# Patient Record
Sex: FEMALE | Race: WHITE | NOT HISPANIC OR LATINO | Employment: OTHER | URBAN - METROPOLITAN AREA
[De-identification: names, ages, dates, MRNs, and addresses within clinical notes are randomized per-mention and may not be internally consistent; named-entity substitution may affect disease eponyms.]

---

## 2017-01-09 ENCOUNTER — VIRTUAL VISIT (OUTPATIENT)
Dept: FAMILY MEDICINE | Facility: CLINIC | Age: 72
End: 2017-01-09
Payer: COMMERCIAL

## 2017-01-09 DIAGNOSIS — M94.262 CHONDROMALACIA, KNEE, LEFT: ICD-10-CM

## 2017-01-09 DIAGNOSIS — M62.838 MUSCLE SPASM: ICD-10-CM

## 2017-01-09 DIAGNOSIS — M16.12 PRIMARY OSTEOARTHRITIS OF LEFT HIP: Primary | ICD-10-CM

## 2017-01-09 PROCEDURE — 99441 ZZC PHYSICIAN TELEPHONE EVALUATION 5-10 MIN: CPT | Performed by: FAMILY MEDICINE

## 2017-01-09 RX ORDER — TIZANIDINE 2 MG/1
2 TABLET ORAL 3 TIMES DAILY PRN
Qty: 90 TABLET | Refills: 5 | Status: SHIPPED | OUTPATIENT
Start: 2017-01-09 | End: 2018-06-25

## 2017-01-09 NOTE — PROGRESS NOTES
"Argenis Marcos is a 71 year old female who is being evaluated via a telephone visit.      The patient has been notified of following:     \"This telephone visit will be conducted via a call between you and your physician/provider. We have found that certain health care needs can be provided without the need for a physical exam.  This service lets us provide the care you need with a short phone conversation.  If a prescription is necessary we can send it directly to your pharmacy.  If lab work is needed we can place an order for that and you can then stop by our lab to have the test done at a later time.    We will bill your insurance company for this service.  Please check with your medical insurance if this type of visit is covered. You may be responsible for the cost of this type of visit if insurance coverage is denied.  The typical cost is $30 (10min), $59 (11-20min) and $85 (21-30min).  Most often these visits are shorter than 10 minutes.    If during the course of the call the physician/provider feels a telephone visit is not appropriate, you will not be charged for this service.\"       Consent has been obtained for this service by 2 care team members: yes. See the scanned image in the medical record.    Argenis Marcos complains of  Recheck Medication and Musculoskeletal Problem      I have reviewed and updated the patient's Past Medical History, Social History, Family History and Medication List.    ALLERGIES  Imitrex; Zomig; Sulfa drugs; and Sumatriptan    Mary Lafleur CMA (AAMA)   (MA signature)    Additional provider notes:   Called and reviewed. Tylenol # 3 allows her to do her daily activities. She has been noting some intermittent dizziness. She has been taking the flexeril and T#3 together.  Reviewed possible side effects. Will also change to tizanidine. She has appt beginning of Feb and we can refill medications at that time. She has Sufficient quantity until then. 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       I have reviewed the note as documented above.  This accurately captures the substance of my conversation with the patient,  Argenis Marcos      Total time of call between patient and provider was 5 minutes

## 2017-02-06 ENCOUNTER — TELEPHONE (OUTPATIENT)
Dept: NURSING | Facility: CLINIC | Age: 72
End: 2017-02-06

## 2017-02-06 NOTE — TELEPHONE ENCOUNTER
"Call Type: Triage Call    Presenting Problem: Patient has \"arthritis\" and took aleve at 12  midnight. \"Wants to know if she could take a tylenol #3 now at 145a  for arthritis pain.\" Triager advised patient to take around 4 am.  Triaged for medication questions-Adult/Disposition to provide health  information.  Triage Note:  Guideline Title: Medication Questions - Adult  Recommended Disposition: Provide Health Information  Original Inclination: Wanted to speak with a nurse  Override Disposition:  Intended Action: Follow advice given  Physician Contacted: No  Caller has medication question(s) that was answered with available resources ?  YES  Pregnant and has medication questions regarding prescribed and/or nonprescribed  medication(s) not covered by available resources ? NO  Breastfeeding and has medication questions regarding prescribed and/or  nonprescribed medication(s) not covered by available resources ? NO  Requested information on how to safely dispose of  or unused medications ?  NO  Sign(s) or symptom(s) associated with a diagnosed condition or with a new illness  ? NO  Prescription ordered today and not available at pharmacy putting patient at  clinical risk ? NO  Recurrence of a symptom(s) or illness post prescribed medication treatment AND  provider instructed patient to call if symptom(s) returned. ? NO  Unable to obtain prescribed medication related to available resources AND  situation poses immediate clinical risk ? NO  Pharmacy calling to clarify prescription order. ? NO  Requests refill of prescribed medication that does NOT have a valid refill; lack  of medication may cause clinical risk to patient if not available. ? NO  Has questions about prescribed and/or nonprescribed medications not covered by  available resources ? NO  Pharmacy calling with prescription question; answered per department policy. ? NO  Requests refill of prescribed medication without valid refills OR requests refill  of " prescribed medication with valid refills but does not have prescription number  (no RX container); lack of medication does not put patient at clinical risk ? NO  Physician Instructions:  Care Advice: Medication Storage: - Store medication as directed, for  example, refrigeration.  - Don't store medications in the bathroom medicine  cabinet or in direct sunlight. Humidity, heat and light can affect  medications' potency and safety. - Store all medications out of the reach  of children.  Safe Use of Medications: - Keep a list of your medications, listing both  brand and generic names, the prescribed dosage, common side effects,  recommended action for side effects, when to call their provider, and any  other specific instructions. This medication list should be updated if any  prescriptions change or if new medications are added. Your list should  include nonprescription medications, vitamins and supplements. An online  resource for a medication list is:  http://www.ahrq.gov/patients-consumers/diagnosis-treatment/treatments/safeme  ds/walletform.html or  http://www.ahrq.gov/patients-consumers/diagnosis-treatment/treatments/safeme  ds/yourmeds.pdf     Ask about your medications interaction with other  medications, supplements or foods. - Take the medication list to each  provider visit, especially if seeing more than one doctor. Make note of any  known allergies on this list. - Use your medication exactly as directed.  Any concerns about side effects should be discussed with your pharmacist or  prescribing provider before changing doses or stopping the medication. -  Don't chew or crush tablets or open capsules unless told to do so.  Some  long-acting medications can be absorbed too quickly when chewed or crushed.  Other medications either won't be effective or could cause side effects. -  If you have difficulty swallowing pills, ask your provider or the  pharmacist if the medication is available in liquid form. - For  liquid  medications, only use measuring device that came with it or was provided by  the pharmacy. Household teaspoons and tablespoons can be inaccurate. -  Never take anyone else's medication.

## 2017-02-15 ENCOUNTER — OFFICE VISIT (OUTPATIENT)
Dept: FAMILY MEDICINE | Facility: CLINIC | Age: 72
End: 2017-02-15
Payer: COMMERCIAL

## 2017-02-15 VITALS
HEIGHT: 68 IN | TEMPERATURE: 98.8 F | DIASTOLIC BLOOD PRESSURE: 76 MMHG | WEIGHT: 152 LBS | HEART RATE: 100 BPM | SYSTOLIC BLOOD PRESSURE: 122 MMHG | BODY MASS INDEX: 23.04 KG/M2

## 2017-02-15 DIAGNOSIS — H60.8X2 CHRONIC ECZEMATOUS OTITIS EXTERNA OF LEFT EAR: ICD-10-CM

## 2017-02-15 DIAGNOSIS — E03.4 HYPOTHYROIDISM DUE TO ACQUIRED ATROPHY OF THYROID: ICD-10-CM

## 2017-02-15 DIAGNOSIS — Z23 FLU VACCINE NEED: ICD-10-CM

## 2017-02-15 DIAGNOSIS — M16.12 PRIMARY OSTEOARTHRITIS OF LEFT HIP: Primary | ICD-10-CM

## 2017-02-15 PROCEDURE — 84443 ASSAY THYROID STIM HORMONE: CPT | Performed by: FAMILY MEDICINE

## 2017-02-15 PROCEDURE — 36415 COLL VENOUS BLD VENIPUNCTURE: CPT | Performed by: FAMILY MEDICINE

## 2017-02-15 PROCEDURE — 90471 IMMUNIZATION ADMIN: CPT | Performed by: FAMILY MEDICINE

## 2017-02-15 PROCEDURE — 99214 OFFICE O/P EST MOD 30 MIN: CPT | Mod: 25 | Performed by: FAMILY MEDICINE

## 2017-02-15 PROCEDURE — 90662 IIV NO PRSV INCREASED AG IM: CPT | Performed by: FAMILY MEDICINE

## 2017-02-15 RX ORDER — LEVOTHYROXINE SODIUM 75 UG/1
75 TABLET ORAL DAILY
Qty: 90 TABLET | Refills: 3 | Status: SHIPPED | OUTPATIENT
Start: 2017-02-15 | End: 2018-02-15

## 2017-02-15 RX ORDER — NEOMYCIN SULFATE, POLYMYXIN B SULFATE AND HYDROCORTISONE 10; 3.5; 1 MG/ML; MG/ML; [USP'U]/ML
4 SUSPENSION/ DROPS AURICULAR (OTIC) 3 TIMES DAILY PRN
Qty: 10 ML | Refills: 0 | Status: SHIPPED | OUTPATIENT
Start: 2017-02-15 | End: 2018-02-15

## 2017-02-15 NOTE — PROGRESS NOTES
"  SUBJECTIVE:                                                    Argenis Marcos is a 71 year old female who presents to clinic today for the following health issues:    Osteoarthritis of left hip. History of left hip osteoarthritis and chondromalacia of left knee. Taking tylenol helps with pain management, but does note increased pain when exposed to inclement weather. Continues to use hot pack and icing with relief.      Hypothyroidism. Though TSH levels were noted to be borderline high on 10/31/2016, patient denies cold intolerance or decreased metabolic rate since then. She continues to take levothyroxine without side effects.     Otitis externa. History of otitis externa of left ear. Using cortisporin sparingly without any side effects .      Past/recent records reviewed and discussed for -- immunizations and medications.      Problem list and histories reviewed & adjusted, as indicated.  Additional history: as documented    Problem list, Medication list, Allergies, and Medical/Social/Surgical histories reviewed in EPIC and updated as appropriate.    ROS:  Constitutional, HEENT, cardiovascular, pulmonary, GI, , musculoskeletal, neuro, skin, endocrine and psych systems are negative, except as otherwise noted.    This document serves as a record of the services and decisions personally performed and made by Carlos A Graves MD. It was created on their behalf by Candelario Spence, a trained medical scribe. The creation of this document is based the provider's statements to the medical scribe.  Candelario Spence February 15, 2017 11:58 AM         OBJECTIVE:                                                    /76 (BP Location: Right arm, Cuff Size: Adult Regular)  Pulse 100  Temp 98.8  F (37.1  C) (Oral)  Ht 1.734 m (5' 8.25\")  Wt 68.9 kg (152 lb)  BMI 22.94 kg/m2  Body mass index is 22.94 kg/(m^2).  GENERAL: healthy, alert and no distress  HENT: ear canals and TM's normal, nose and mouth without ulcers or lesions  NECK: no " adenopathy, no asymmetry, masses, or scars and thyroid normal to palpation  RESP: lungs clear to auscultation - no rales, rhonchi or wheezes  CV: regular rate and rhythm, normal S1 S2, no S3 or S4, no murmur, click or rub, no peripheral edema   MS: no gross musculoskeletal defects noted, no edema -- tenderness of left knee joint space, no effusion  PSYCH: mentation appears normal, affect normal/bright      Diagnostic Test Results:  none     ASSESSMENT/PLAN:                                                    (M16.12) Primary osteoarthritis of left hip  (primary encounter diagnosis)  Comment: unchanged  Plan: acetaminophen-codeine (TYLENOL #3) 300-30 MG         per tablet        Continue present medications, medications refilled    (H60.8X2) Chronic eczematous otitis externa of left ear  Comment: stable  Plan: neomycin-polymyxin-hydrocortisone (CORTISPORIN)        3.5-28243-9 otic suspension        Medications refilled, continue present medications    (E03.4) Hypothyroidism due to acquired atrophy of thyroid  Comment: previous TSH levels were borderline elevated. We'll recheck today  Plan: levothyroxine (SYNTHROID/LEVOTHROID) 75 MCG         tablet, TSH with free T4 reflex        Follow up, pending results    (Z23) Flu vaccine need  Comment:   Plan: FLU VACCINE, INCREASED ANTIGEN, PRESV FREE              Patient Instructions     Orders Placed This Encounter     FLU VACCINE, INCREASED ANTIGEN, PRESV FREE     TSH with free T4 reflex     Last Lab Result: TSH (mU/L)       Date                     Value                 10/31/2016               7.15 (H)         ----------     neomycin-polymyxin-hydrocortisone (CORTISPORIN) 3.5-87203-6 otic suspension     Sig: Place 4 drops in ear(s) 3 times daily as needed     Dispense:  10 mL     Refill:  0     acetaminophen-codeine (TYLENOL #3) 300-30 MG per tablet     Sig: Take 1 tablet by mouth 3 times daily     Dispense:  90 tablet     Refill:  3     levothyroxine (SYNTHROID/LEVOTHROID)  75 MCG tablet     Sig: Take 1 tablet (75 mcg) by mouth daily     Dispense:  90 tablet     Refill:  3             Obey Graves MD, MD  [facility]

## 2017-02-15 NOTE — LETTER
[facility]  [address] [place] [id number] [phone]      February 16, 2017      Argenis Marcos  [address] [place] [id number]              Dear Argenis,    The results of your recent thyroid tests was  within normal limits. Enclosed is a copy of these results.  If you have any further questions or problems, please contact our office.     Sincerely,    Obey Graves MD/sd    Results for orders placed or performed in visit on 02/15/17   TSH with free T4 reflex   Result Value Ref Range    TSH 0.66 0.40 - 4.00 mU/L

## 2017-02-15 NOTE — NURSING NOTE
Injectable Influenza Vaccination Documentation    1.  Is the person to be vaccinated sick today?  No    2.  Does the person to be vaccinated have an allergy to eggs or to a component of the vaccine?  No    3.  Has the person to be vaccinated ever had a serious reaction to an influenza vaccine in the past?  No    4.  Has the person to be vaccinated ever had [place] Syndrome?  No    Questions reviewed by: Ana Laura Hood MA

## 2017-02-15 NOTE — MR AVS SNAPSHOT
"              After Visit Summary   2/15/2017    Argenis Marcos    MRN: [id number]           Patient Information     Date Of Birth          1945        Visit Information        Provider Department      2/15/2017 11:20 AM Obey Graves MD [facility]        Today's Diagnoses     Primary osteoarthritis of left hip    -  1    Acute contact otitis externa of right ear        Hypothyroidism due to acquired atrophy of thyroid        Flu vaccine need           Follow-ups after your visit        Who to contact     If you have questions or need follow up information about today's clinic visit or your schedule please contact [facility] directly at [phone].  Normal or non-critical lab and imaging results will be communicated to you by MyChart, letter or phone within 4 business days after the clinic has received the results. If you do not hear from us within 7 days, please contact the clinic through MyChart or phone. If you have a critical or abnormal lab result, we will notify you by phone as soon as possible.  Submit refill requests through [employer] or call your pharmacy and they will forward the refill request to us. Please allow 3 business days for your refill to be completed.          Additional Information About Your Visit        MyChart Information     [employer] lets you send messages to your doctor, view your test results, renew your prescriptions, schedule appointments and more. To sign up, go to [email or web address] . Click on \"Log in\" on the left side of the screen, which will take you to the Welcome page. Then click on \"Sign up Now\" on the right side of the page.     You will be asked to enter the access code listed below, as well as some personal information. Please follow the directions to create your username and password.     Your access code is: [email or web address]  Expires: 2017 12:07 PM     Your access code will  in 90 days. If you need help or a " "new code, please call your [facility] clinic or [phone].        Care EveryWhere ID     This is your Care EveryWhere ID. This could be used by other organizations to access your [facility] medical records  [id number]        Your Vitals Were     Pulse Temperature Height BMI (Body Mass Index)          100 98.8  F (37.1  C) (Oral) 5' 8.25\" (1.734 m) 22.94 kg/m2         Blood Pressure from Last 3 Encounters:   02/15/17 122/76   10/31/16 106/84   02/15/16 100/72    Weight from Last 3 Encounters:   02/15/17 152 lb (68.9 kg)   10/31/16 158 lb (71.7 kg)   02/15/16 169 lb (76.7 kg)              We Performed the Following     FLU VACCINE, INCREASED ANTIGEN, PRESV FREE     TSH with free T4 reflex          Where to get your medicines      These medications were sent to [facility] [address] [place], [place] [facility] [address] [facility] [address]  [address] [facility] [address]     Phone:  [phone]     levothyroxine 75 MCG tablet    neomycin-polymyxin-hydrocortisone 3.5-84112-5 otic suspension         Some of these will need a paper prescription and others can be bought over the counter.  Ask your nurse if you have questions.     Bring a paper prescription for each of these medications     acetaminophen-codeine 300-30 MG per tablet          Primary Care Provider Office Phone # Fax #    Obey Graves [phone]       [address] [address]        Thank you!     Thank you for choosing [facility]  for your care. Our goal is always to provide you with excellent care. Hearing back from our patients is one way we can continue to improve our services. Please take a few minutes to complete the written survey that you may receive in the mail after your visit with us. Thank you!             Your Updated Medication List - Protect others around you: Learn how to safely use, store and throw away your medicines at " www.disposemymeds.org.          This list is accurate as of: 2/15/17 12:07 PM.  Always use your most recent med list.                   Brand Name Dispense Instructions for use    acetaminophen-codeine 300-30 MG per tablet    TYLENOL #3    90 tablet    Take 1 tablet by mouth 3 times daily       aspirin 81 MG tablet     100    1 tab po QD (Once per day)       CALCIUM 600 + D 600-200 MG-UNIT Tabs      2 tabs daily       clotrimazole 1 % cream    SM CLOTRIMAZOLE VAGINAL    45 g    Place 1 Applicatorful vaginally daily And apply externally bid       Cranberry 500 MG Caps     90    1 CAPSULE 2 TIMES DAILY       DIPROLENE 0.05 % ointment   Generic drug:  augmented betamethasone dipropionate     60gm     apply to the affected area bid       EPINEPHrine 0.3 MG/0.3ML injection     0.6 mL    Inject 0.3 mLs (0.3 mg) into the muscle once as needed for anaphylaxis       levothyroxine 75 MCG tablet    SYNTHROID/LEVOTHROID    90 tablet    Take 1 tablet (75 mcg) by mouth daily       Miconazole Nitrate 2 % Powd     1 Bottle    1 Application daily as needed       neomycin-polymyxin-hydrocortisone 3.5-01236-8 otic suspension    CORTISPORIN    10 mL    Place 4 drops in ear(s) 3 times daily as needed       omega-3 fatty acids 1200 MG capsule      Take 1 capsule by mouth daily.       polyethylene glycol 0.4%- propylene glycol 0.3% 0.4-0.3 % Soln ophthalmic solution    SYSTANE ULTRA    1 Bottle    Place 1 drop into both eyes as needed for dry eyes       rizatriptan 5 MG ODT tab    MAXALT-MLT    18 tablet    Take 1-2 tablets (5-10 mg) by mouth at onset of headache for migraine May repeat dose in 2 hours.  Do not exceed 30 mg in 24 hours       simvastatin 40 MG tablet    ZOCOR    90 tablet    Take 1 tablet (40 mg) by mouth At Bedtime       tiZANidine 2 MG tablet    ZANAFLEX    90 tablet    Take 1 tablet (2 mg) by mouth 3 times daily as needed for muscle spasms

## 2017-02-15 NOTE — PATIENT INSTRUCTIONS
Orders Placed This Encounter     FLU VACCINE, INCREASED ANTIGEN, PRESV FREE     TSH with free T4 reflex     Last Lab Result: TSH (mU/L)       Date                     Value                 10/31/2016               7.15 (H)         ----------     neomycin-polymyxin-hydrocortisone (CORTISPORIN) 3.5-09925-4 otic suspension     Sig: Place 4 drops in ear(s) 3 times daily as needed     Dispense:  10 mL     Refill:  0     acetaminophen-codeine (TYLENOL #3) 300-30 MG per tablet     Sig: Take 1 tablet by mouth 3 times daily     Dispense:  90 tablet     Refill:  3     levothyroxine (SYNTHROID/LEVOTHROID) 75 MCG tablet     Sig: Take 1 tablet (75 mcg) by mouth daily     Dispense:  90 tablet     Refill:  3

## 2017-02-15 NOTE — NURSING NOTE
"Chief Complaint   Patient presents with     Hip Pain     Follow up. Patient statest that her left hip and left knee pain are starting to get better now. The cold weather was making the pain worse.     Knee Pain     Follow up.       Initial /76 (BP Location: Right arm, Cuff Size: Adult Regular)  Pulse 100  Temp 98.8  F (37.1  C) (Oral)  Ht 5' 8.25\" (1.734 m)  Wt 152 lb (68.9 kg)  BMI 22.94 kg/m2 Estimated body mass index is 22.94 kg/(m^2) as calculated from the following:    Height as of this encounter: 5' 8.25\" (1.734 m).    Weight as of this encounter: 152 lb (68.9 kg).  Medication Reconciliation: complete     Ana Laura Hood MA     "

## 2017-02-16 LAB — TSH SERPL DL<=0.005 MIU/L-ACNC: 0.66 MU/L (ref 0.4–4)

## 2017-05-26 ENCOUNTER — TELEPHONE (OUTPATIENT)
Dept: FAMILY MEDICINE | Facility: CLINIC | Age: 72
End: 2017-05-26

## 2017-05-26 DIAGNOSIS — M16.12 PRIMARY OSTEOARTHRITIS OF LEFT HIP: ICD-10-CM

## 2017-05-26 NOTE — TELEPHONE ENCOUNTER
Reason for Call:  Medication or medication refill:    Do you use a [facility]?  Name of the pharmacy and phone number for the current request:  [facility]    Name of the medication requested: acetaminophen-codeine (TYLENOL #3) 300-30 MG per tablet    Other request:     Can we leave a detailed message on this number? YES    Phone number patient can be reached at: Home number on file [phone] (home)    Best Time: any    Call taken on 5/26/2017 at 2:23 PM by Lizzie Torres

## 2017-05-30 NOTE — TELEPHONE ENCOUNTER
Patient calling to check on status or refill.  Informed that refill was done and she will NTBS before next refill.  Patient will call back to schedule an appointment.

## 2017-05-30 NOTE — TELEPHONE ENCOUNTER
1 script faxed to [facility] at [phone].    Please contact patient to schedule follow up appointment.    Romana Alston, Certified Medical Assistant (AAMA)

## 2017-07-31 DIAGNOSIS — N89.8 VAGINAL ITCHING: ICD-10-CM

## 2017-08-01 NOTE — TELEPHONE ENCOUNTER
clotrimazole ( CLOTRIMAZOLE VAGINAL) 1 % vaginal cream   1 Applicatorful, DAILY 0 ordered  Reorder     Summary: Place 1 Applicatorful vaginally daily And apply externally bid  Disp-45 g, R-0  E-Prescribe   Dose, Route, Frequency: 1 Applicatorful, Vaginal, DAILY  Start: 2/14/2016  Ord/Sold: 2/14/2016 (O)  Report  Taking:   Long-term:   Pharmacy: [facility] [address] [place] [address] [address]VE [address] AT [address] Dose History  ChangeDiscontinue       Patient Sig: Place 1 Applicatorful vaginally daily And apply externally bid       Ordered on: 2/14/2016       Authorized by: SHELDON TOBIAS       Dispense: 45 g       Admin Instructions: And apply externally bid          Last Office Visit with [facility], [facility] or [facility] prescribing provider: 2-

## 2017-08-01 NOTE — TELEPHONE ENCOUNTER
Review symptoms with patient. Ok to refill if her symptoms are consistent with yeast infection.     Carlos A Graves MD

## 2017-08-02 RX ORDER — CLOTRIMAZOLE 1 %
CREAM WITH APPLICATOR VAGINAL
Qty: 45 G | Refills: 0 | Status: SHIPPED | OUTPATIENT
Start: 2017-08-02 | End: 2018-05-31

## 2017-08-02 NOTE — TELEPHONE ENCOUNTER
Called and spoke with patient. She states that it happens when it's hot out. She states she gets constipated and her legs are together and her legs rub up against her vagina. Itching and redness. No pain anywhere, open sores, fever, or discharge. Huddled with PCP, ok to refill but come in if it does not improve.    Junior Pickett RN

## 2017-09-18 ENCOUNTER — RADIANT APPOINTMENT (OUTPATIENT)
Dept: GENERAL RADIOLOGY | Facility: CLINIC | Age: 72
End: 2017-09-18
Attending: FAMILY MEDICINE
Payer: COMMERCIAL

## 2017-09-18 ENCOUNTER — OFFICE VISIT (OUTPATIENT)
Dept: FAMILY MEDICINE | Facility: CLINIC | Age: 72
End: 2017-09-18
Payer: COMMERCIAL

## 2017-09-18 VITALS
SYSTOLIC BLOOD PRESSURE: 114 MMHG | TEMPERATURE: 98.4 F | BODY MASS INDEX: 22.43 KG/M2 | HEART RATE: 88 BPM | WEIGHT: 148 LBS | DIASTOLIC BLOOD PRESSURE: 78 MMHG | HEIGHT: 68 IN

## 2017-09-18 DIAGNOSIS — Z12.31 ENCOUNTER FOR SCREENING MAMMOGRAM FOR BREAST CANCER: ICD-10-CM

## 2017-09-18 DIAGNOSIS — Z80.3 FAMILY HISTORY OF MALIGNANT NEOPLASM OF BREAST: ICD-10-CM

## 2017-09-18 DIAGNOSIS — E78.5 HYPERLIPIDEMIA LDL GOAL <130: ICD-10-CM

## 2017-09-18 DIAGNOSIS — G43.109 MIGRAINE WITH AURA AND WITHOUT STATUS MIGRAINOSUS, NOT INTRACTABLE: ICD-10-CM

## 2017-09-18 DIAGNOSIS — M25.562 LEFT KNEE PAIN, UNSPECIFIED CHRONICITY: Primary | ICD-10-CM

## 2017-09-18 DIAGNOSIS — M25.562 ACUTE PAIN OF LEFT KNEE: ICD-10-CM

## 2017-09-18 PROCEDURE — 73560 X-RAY EXAM OF KNEE 1 OR 2: CPT | Mod: LT

## 2017-09-18 PROCEDURE — 99214 OFFICE O/P EST MOD 30 MIN: CPT | Performed by: FAMILY MEDICINE

## 2017-09-18 RX ORDER — SIMVASTATIN 40 MG
40 TABLET ORAL AT BEDTIME
Qty: 90 TABLET | Refills: 3 | Status: SHIPPED | OUTPATIENT
Start: 2017-09-18 | End: 2018-06-25

## 2017-09-18 RX ORDER — RIZATRIPTAN BENZOATE 5 MG/1
5-10 TABLET, ORALLY DISINTEGRATING ORAL
Qty: 18 TABLET | Refills: 5 | Status: SHIPPED | OUTPATIENT
Start: 2017-09-18 | End: 2018-06-25

## 2017-09-18 NOTE — PATIENT INSTRUCTIONS
[phone] [facility] scheduling for mammogram        simvastatin (ZOCOR) 40 MG tablet     Sig: Take 1 tablet (40 mg) by mouth At Bedtime     Dispense:  90 tablet     Refill:  3     rizatriptan (MAXALT-MLT) 5 MG ODT tab     Sig: Take 1-2 tablets (5-10 mg) by mouth at onset of headache for migraine May repeat dose in 2 hours.  Do not exceed 30 mg in 24 hours     Dispense:  18 tablet     Refill:  5

## 2017-09-18 NOTE — NURSING NOTE
"Chief Complaint   Patient presents with     Hyperlipidemia     Refill Request     Tylenol #3     Knee Pain     Left knee injury from 1 month ago.        Initial /78 (BP Location: Right arm, Cuff Size: Adult Regular)  Pulse 88  Temp 98.4  F (36.9  C) (Oral)  Ht 5' 8.25\" (1.734 m)  Wt 148 lb (67.1 kg)  BMI 22.34 kg/m2 Estimated body mass index is 22.34 kg/(m^2) as calculated from the following:    Height as of this encounter: 5' 8.25\" (1.734 m).    Weight as of this encounter: 148 lb (67.1 kg).  Medication Reconciliation: complete     Mary Lafleur CMA (AAMA)      "

## 2017-09-18 NOTE — MR AVS SNAPSHOT
After Visit Summary   9/18/2017    Argenis Marcos    MRN: [id number]           Patient Information     Date Of Birth          1945        Visit Information        Provider Department      9/18/2017 11:00 AM Obey Graves MD [facility]        Today's Diagnoses     Left knee pain, unspecified chronicity    -  1    Hyperlipidemia LDL goal <130        Migraine with aura and without status migrainosus, not intractable        Elevated LFTs        Family history of malignant neoplasm of breast        Encounter for screening mammogram for breast cancer          Care Instructions    [phone] [place] imaging scheduling for mammogram        simvastatin (ZOCOR) 40 MG tablet     Sig: Take 1 tablet (40 mg) by mouth At Bedtime     Dispense:  90 tablet     Refill:  3     rizatriptan (MAXALT-MLT) 5 MG ODT tab     Sig: Take 1-2 tablets (5-10 mg) by mouth at onset of headache for migraine May repeat dose in 2 hours.  Do not exceed 30 mg in 24 hours     Dispense:  18 tablet     Refill:  5               Follow-ups after your visit        Your next 10 appointments already scheduled     Oct 16, 2017  1:00 PM CDT   New Visit with Hyacinth Sim OD   [facility] ([facility])    [address] [place] [id number] [phone]              Future tests that were ordered for you today     Open Future Orders        Priority Expected Expires Ordered    *MA Screening Digital Bilateral Routine  9/18/2018 9/18/2017            Who to contact     If you have questions or need follow up information about today's clinic visit or your schedule please contact [facility] directly at [phone].  Normal or non-critical lab and imaging results will be communicated to you by MyChart, letter or phone within 4 business days after the clinic has received the results. If you do not hear from us within 7 days, please contact the clinic through  "[employer] or phone. If you have a critical or abnormal lab result, we will notify you by phone as soon as possible.  Submit refill requests through [employer] or call your pharmacy and they will forward the refill request to us. Please allow 3 business days for your refill to be completed.          Additional Information About Your Visit        [employer] Information     [employer] lets you send messages to your doctor, view your test results, renew your prescriptions, schedule appointments and more. To sign up, go to [email or web address] . Click on \"Log in\" on the left side of the screen, which will take you to the Welcome page. Then click on \"Sign up Now\" on the right side of the page.     You will be asked to enter the access code listed below, as well as some personal information. Please follow the directions to create your username and password.     Your access code is: [email or web address]  Expires: 2017 12:27 PM     Your access code will  in 90 days. If you need help or a new code, please call your [facility] clinic or [phone].        Care EveryWhere ID     This is your Care EveryWhere ID. This could be used by other organizations to access your [facility] medical records  [id number]        Your Vitals Were     Pulse Temperature Height BMI (Body Mass Index)          88 98.4  F (36.9  C) (Oral) 5' 8.25\" (1.734 m) 22.34 kg/m2         Blood Pressure from Last 3 Encounters:   17 114/78   02/15/17 122/76   10/31/16 106/84    Weight from Last 3 Encounters:   17 148 lb (67.1 kg)   02/15/17 152 lb (68.9 kg)   10/31/16 158 lb (71.7 kg)                 Where to get your medicines      These medications were sent to [facility] - [place], [place] - [address] AT [facility] [address]  [address] [facility] [address]     Phone:  [phone]     rizatriptan 5 MG ODT tab    simvastatin 40 MG tablet          Primary Care Provider Office Phone # Fax #    Obey Graves, " [phone]       [address] [facility] [address]        Equal Access to Services     SPENCER ARREDONDO : Hadii aad ku hadmalcolmdano Nani, wasridharda lualaina, qaybta kaalmada bharat, ayla rajatin hayaaaditya samsonrusty mccullough laKervinpuja umana. So [facility] [phone].    ATENCIÓN: Si habla español, tiene a arauz disposición servicios gratuitos de asistencia lingüística. Llame al [phone].    We comply with applicable federal civil rights laws and [place] laws. We do not discriminate on the basis of race, color, national origin, age, disability sex, sexual orientation or gender identity.            Thank you!     Thank you for choosing [facility]  for your care. Our goal is always to provide you with excellent care. Hearing back from our patients is one way we can continue to improve our services. Please take a few minutes to complete the written survey that you may receive in the mail after your visit with us. Thank you!             Your Updated Medication List - Protect others around you: Learn how to safely use, store and throw away your medicines at www.disposemymeds.org.          This list is accurate as of: 9/18/17 12:27 PM.  Always use your most recent med list.                   Brand Name Dispense Instructions for use Diagnosis    acetaminophen-codeine 300-30 MG per tablet    TYLENOL #3    90 tablet    Take 1 tablet by mouth 3 times daily    Primary osteoarthritis of left hip       aspirin 81 MG tablet     100    1 tab po QD (Once per day)        CALCIUM 600 + D 600-200 MG-UNIT Tabs      2 tabs daily    Routine general medical examination at a health care facility       * clotrimazole 1 % cream    SM CLOTRIMAZOLE VAGINAL    45 g    Place 1 Applicatorful vaginally daily And apply externally bid    Vaginal itching       * clotrimazole 1 % cream    LOTRIMIN    45 g    PLACE ONE APPLICATORFUL VAGINALLY DAILY AND APPLY EXTERNALLY TWICE DAILY AS DIRECTED    Vaginal itching       Cranberry 500  MG Caps     90    1 CAPSULE 2 TIMES DAILY        DIPROLENE 0.05 % ointment   Generic drug:  augmented betamethasone dipropionate     60gm     apply to the affected area bid    Contact dermatitis and other eczema, due to unspecified cause       EPINEPHrine 0.3 MG/0.3ML injection 2-pack    EPIPEN/ADRENACLICK/or ANY BX GENERIC EQUIV    0.6 mL    Inject 0.3 mLs (0.3 mg) into the muscle once as needed for anaphylaxis        levothyroxine 75 MCG tablet    SYNTHROID/LEVOTHROID    90 tablet    Take 1 tablet (75 mcg) by mouth daily    Hypothyroidism due to acquired atrophy of thyroid       Miconazole Nitrate 2 % Powd     1 Bottle    1 Application daily as needed    Candidal intertrigo       neomycin-polymyxin-hydrocortisone 3.5-76493-9 otic suspension    CORTISPORIN    10 mL    Place 4 drops in ear(s) 3 times daily as needed    Chronic eczematous otitis externa of left ear       omega-3 fatty acids 1200 MG capsule      Take 1 capsule by mouth daily.        polyethylene glycol 0.4%- propylene glycol 0.3% 0.4-0.3 % Soln ophthalmic solution    SYSTANE ULTRA    1 Bottle    Place 1 drop into both eyes as needed for dry eyes    Dry eyes, bilateral       rizatriptan 5 MG ODT tab    MAXALT-MLT    18 tablet    Take 1-2 tablets (5-10 mg) by mouth at onset of headache for migraine May repeat dose in 2 hours.  Do not exceed 30 mg in 24 hours    Migraine with aura and without status migrainosus, not intractable       simvastatin 40 MG tablet    ZOCOR    90 tablet    Take 1 tablet (40 mg) by mouth At Bedtime    Hyperlipidemia LDL goal <130       tiZANidine 2 MG tablet    ZANAFLEX    90 tablet    Take 1 tablet (2 mg) by mouth 3 times daily as needed for muscle spasms    Muscle spasm       * Notice:  This list has 2 medication(s) that are the same as other medications prescribed for you. Read the directions carefully, and ask your doctor or other care provider to review them with you.

## 2017-09-18 NOTE — PROGRESS NOTES
SUBJECTIVE:   Argenis Marcos is a 71 year old female who presents to clinic today for the following health issues:      Hyperlipidemia Follow-Up      Rate your low fat/cholesterol diet?: not monitoring fat    Taking statin?  Yes, no muscle aches from statin    Other lipid medications/supplements?:  Fish oil/Omega 3, dose 1200MG without side effects         Amount of exercise or physical activity: None    Problems taking medications regularly: No    Medication side effects: none  Diet: regular (no restrictions)    Joint Pain    Onset: 1 month     Description:   Location: Left knee   Character: Sharp and stinging pain    Intensity: moderate    Progression of Symptoms: better    Accompanying Signs & Symptoms:  Other symptoms: swelling and occasional popping when bending     History:   Previous similar pain: YES      Precipitating factors:   Trauma or overuse: YES- Hit her knee on the wall while taking her chair lift up the stairs.   Pain is worse with weight bearing     Alleviating factors:  Improved by: heat and ice    Therapies Tried and outcome: Tylenol #3 helps when pain is really bad.        Problem list and histories reviewed & adjusted, as indicated.  Additional history: as documented    Patient Active Problem List   Diagnosis     Other disorder of menstruation and other abnormal bleeding from female genital tract     Esophageal reflux     Arthropathy     Cervicalgia     Toxic effect venom     Hypothyroidism     Benign neoplasm of skin of trunk, except scrotum     Pain in joint, lower leg     HYPERLIPIDEMIA LDL GOAL <130     Moderate major depression (H)     Advanced directives, counseling/discussion     Migraines     Vitreous syneresis     Osteoarthritis of hip - left     Chondromalacia, knee - left     Elevated LFTs     Muscle spasm     Cataracts, both eyes     PVD (posterior vitreous detachment), both eyes     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,, sequela     Past Surgical History:   Procedure Laterality  Date     C NONSPECIFIC PROCEDURE      URETHRAL DILATATION     HC DILATION/CURETTAGE DIAG/THER NON OB  5/21/04    D & C,hysteroscopic resection of large submucosal fibroid       Social History   Substance Use Topics     Smoking status: Former Smoker     Smokeless tobacco: Never Used      Comment: quit in the 70's     Alcohol use No     Family History   Problem Relation Age of Onset     Hypertension Mother      CANCER Mother      Glaucoma Mother      Hypertension Sister      Breast Cancer Sister      Hypertension Brother      HEART DISEASE Father      Lipids Father      DIABETES Father      Cancer - colorectal No family hx of      CEREBROVASCULAR DISEASE No family hx of      Thyroid Disease No family hx of      Macular Degeneration No family hx of          Current Outpatient Prescriptions   Medication Sig Dispense Refill     clotrimazole (LOTRIMIN) 1 % cream PLACE ONE APPLICATORFUL VAGINALLY DAILY AND APPLY EXTERNALLY TWICE DAILY AS DIRECTED 45 g 0     acetaminophen-codeine (TYLENOL #3) 300-30 MG per tablet Take 1 tablet by mouth 3 times daily 90 tablet 3     neomycin-polymyxin-hydrocortisone (CORTISPORIN) 3.5-95996-4 otic suspension Place 4 drops in ear(s) 3 times daily as needed 10 mL 0     levothyroxine (SYNTHROID/LEVOTHROID) 75 MCG tablet Take 1 tablet (75 mcg) by mouth daily 90 tablet 3     tiZANidine (ZANAFLEX) 2 MG tablet Take 1 tablet (2 mg) by mouth 3 times daily as needed for muscle spasms 90 tablet 5     simvastatin (ZOCOR) 40 MG tablet Take 1 tablet (40 mg) by mouth At Bedtime 90 tablet 3     rizatriptan (MAXALT-MLT) 5 MG disintegrating tablet Take 1-2 tablets (5-10 mg) by mouth at onset of headache for migraine May repeat dose in 2 hours.  Do not exceed 30 mg in 24 hours 18 tablet 5     EPINEPHrine 0.3 MG/0.3ML injection 2-pack Inject 0.3 mLs (0.3 mg) into the muscle once as needed for anaphylaxis 0.6 mL 3     clotrimazole (SM CLOTRIMAZOLE VAGINAL) 1 % vaginal cream Place 1 Applicatorful vaginally daily  "And apply externally bid 45 g 0     Miconazole Nitrate 2 % POWD 1 Application daily as needed 1 Bottle 3     polyethylene glycol 0.4%- propylene glycol 0.3% (SYSTANE ULTRA) 0.4-0.3 % SOLN ophthalmic solution Place 1 drop into both eyes as needed for dry eyes 1 Bottle 12     Omega-3 Fatty Acids (FISH OIL) 1200 MG capsule Take 1 capsule by mouth daily.       DIPROLENE 0.05 % EX OINT apply to the affected area bid  60gm 6     CALCIUM 600 + D 600-200 MG-UNIT OR TABS 2 tabs daily  3     CRANBERRY 500 MG OR CAPS 1 CAPSULE 2 TIMES DAILY 90 0     ASPIRIN 81 MG OR TABS 1 tab po QD (Once per day) 100 3     Allergies   Allergen Reactions     Imitrex [Sumatriptan Succinate] Shortness Of Breath     Zomig [Zolmitriptan] Shortness Of Breath     Sulfa Drugs      Sumatriptan      Labs reviewed in EPIC      Reviewed and updated as needed this visit by clinical staffTobacco  Allergies  Meds  Med Hx  Surg Hx  Fam Hx  Soc Hx      Reviewed and updated as needed this visit by Provider         ROS:  Constitutional, neuro, ENT, endocrine, pulmonary, cardiac, gastrointestinal, genitourinary, musculoskeletal, integument and psychiatric systems are negative, except as otherwise noted.    This document serves as a record of the services and decisions personally performed and made by Obey Graves MD. It was created on his behalf by Zaria Paredes, a trained medical scribe. The creation of this document is based the provider's statements to the medical scribe.  Zaria Paredes 11:37 AM September 18, 2017    OBJECTIVE:                                                    /78 (BP Location: Right arm, Cuff Size: Adult Regular)  Pulse 88  Temp 98.4  F (36.9  C) (Oral)  Ht 5' 8.25\" (1.734 m)  Wt 148 lb (67.1 kg)  BMI 22.34 kg/m2  Body mass index is 22.34 kg/(m^2).  GENERAL APPEARANCE: healthy, alert and no distress  RESP: lungs clear to auscultation - no rales, rhonchi or wheezes  CV: regular rates and rhythm, normal S1 S2, no S3 or " S4 and no murmur, click or rub  MS: tenderness with MCL flexion otherwise ROM unremarkable, no point tenderness, extremities normal- no gross deformities noted  Difficulty weight bearing with antalgic gait   ABD: soft  PSYCH: mentation appears normal and affect normal/bright    Knee was wrapped with Ace bandage for extra support     Diagnostic Test Results:  L knee Xray - unremarkable, no signs of fracture or significant degenerative disease. Unchanged from previous in 2012.       ASSESSMENT/PLAN:                                                    (M25.562) Left knee pain, unspecified chronicity  (primary encounter diagnosis)  Comment: probable tendonitis. X-ray negativ  Plan: ACE wrap. Consider PT.     (E78.5) Hyperlipidemia LDL goal <130  Comment: stable.  Plan: simvastatin (ZOCOR) 40 MG tablet        refill    (G43.109) Migraine with aura and without status migrainosus, not intractable  Comment: controlled  Plan: rizatriptan (MAXALT-MLT) 5 MG ODT tab        refilled        (Z80.3) Family history of malignant neoplasm of breast  Comment:   Plan: *MA Screening Digital Bilateral            (Z12.31) Encounter for screening mammogram for breast cancer  Comment:   Plan: *MA Screening Digital Bilateral            The information in this document, created by the medical scribe for me, accurately reflects the services I personally performed and the decisions made by me. I have reviewed and approved this document for accuracy prior to leaving the patient care area.    Obey Graves MD, MD  [facility]

## 2017-12-27 DIAGNOSIS — M16.12 PRIMARY OSTEOARTHRITIS OF LEFT HIP: Primary | ICD-10-CM

## 2017-12-27 NOTE — TELEPHONE ENCOUNTER
Controlled substance, forwarding to PCP    Refill X1. Needs follow up for further refills.     Carlos A Graves MD      Refill in completed folder

## 2017-12-27 NOTE — TELEPHONE ENCOUNTER
Patient calling to check on status of refill request. Patient notified the refill request has been received.

## 2018-01-11 ENCOUNTER — TELEPHONE (OUTPATIENT)
Dept: FAMILY MEDICINE | Facility: CLINIC | Age: 73
End: 2018-01-11

## 2018-01-11 DIAGNOSIS — M16.12 PRIMARY OSTEOARTHRITIS OF LEFT HIP: ICD-10-CM

## 2018-01-11 NOTE — TELEPHONE ENCOUNTER
acetaminophen-codeine (TYLENOL #3) 300-30 MG per tablet      Last Written Prescription Date:  12/27/2017  Last Fill Quantity: 90 tablet,   # refills: 0  Last Office Visit: 9/18/2017  Future Office visit:    Next 5 appointments (look out 90 days)     Feb 15, 2018 12:00 PM CST   SHORT with Obey Graves MD   [facility] ([facility])    [address] [place] [id number] [phone]                   Routing refill request to provider for review/approval because:  Drug not on the [facility], [facility] or [facility] refill protocol or controlled substance

## 2018-01-15 NOTE — TELEPHONE ENCOUNTER
Patient has appointment 2-15 and will review long term use and more refills at that time'    Ok to give refill to get her through until then    Refill in completed folder    Carlos A Graves MD

## 2018-01-31 ENCOUNTER — TELEPHONE (OUTPATIENT)
Dept: FAMILY MEDICINE | Facility: CLINIC | Age: 73
End: 2018-01-31

## 2018-01-31 DIAGNOSIS — M16.12 PRIMARY OSTEOARTHRITIS OF LEFT HIP: ICD-10-CM

## 2018-01-31 NOTE — TELEPHONE ENCOUNTER
acetaminophen-codeine (TYLENOL #3) 300-30 MG per tablet      Last Written Prescription Date:  1/15/2018  Last Fill Quantity: 90 tablet,   # refills: 0  Last Office Visit: 9/18/2017  Future Office visit:    Next 5 appointments (look out 90 days)     Feb 15, 2018 12:00 PM CST   SHORT with Obey Graves MD   [facility] ([facility]    [address] [place] [id number] [phone]                   Routing refill request to provider for review/approval because:  Drug not on the [facility], [facility] or [facility] refill protocol or controlled substance

## 2018-02-01 NOTE — TELEPHONE ENCOUNTER
Patient calling back regarding refill request. She states that she only has 4 pills left. TC questioned how often she is taking medication. She initially stated that she is taking 2 pills per day. TC said if that was correct, she should have about 60 pills left from 01/15 refill. Then she stated, no I'm taking 3 per day. Routing as patient calls to address as patient may be taking medication incorrectly.

## 2018-02-01 NOTE — TELEPHONE ENCOUNTER
I will give her 30 tablets but she needs to be seen.her use has clearly increased.   Refill in completed folder    Carlos A Graves MD

## 2018-02-01 NOTE — TELEPHONE ENCOUNTER
Patient was given 90 tabs 1/15 she states she takes 3 a day sometimes on occasion 4 a day never 5. The Math does not add up she says she needs to take them more than 3 times a day for her pain in her joints. She has 4 tablets left and apt mid Feb. Routing to PCP do you wish to refill before apt.  Zaria Ravi,Clinic Rn  [facility]

## 2018-02-02 NOTE — TELEPHONE ENCOUNTER
Called patient and let her know that Dr Graves did give her a script for medication.  Patient is scheduled to see Dr Graves on 02/15/2018.    Faxed script to [facility] at [phone].      Heidi Judge

## 2018-02-15 ENCOUNTER — OFFICE VISIT (OUTPATIENT)
Dept: FAMILY MEDICINE | Facility: CLINIC | Age: 73
End: 2018-02-15
Payer: COMMERCIAL

## 2018-02-15 VITALS
SYSTOLIC BLOOD PRESSURE: 100 MMHG | WEIGHT: 149 LBS | DIASTOLIC BLOOD PRESSURE: 76 MMHG | BODY MASS INDEX: 22.49 KG/M2 | HEART RATE: 88 BPM | TEMPERATURE: 99.1 F

## 2018-02-15 DIAGNOSIS — H60.8X2 CHRONIC ECZEMATOUS OTITIS EXTERNA OF LEFT EAR: ICD-10-CM

## 2018-02-15 DIAGNOSIS — M16.12 PRIMARY OSTEOARTHRITIS OF LEFT HIP: Primary | ICD-10-CM

## 2018-02-15 DIAGNOSIS — Z23 NEED FOR PROPHYLACTIC VACCINATION AND INOCULATION AGAINST INFLUENZA: ICD-10-CM

## 2018-02-15 DIAGNOSIS — E03.4 HYPOTHYROIDISM DUE TO ACQUIRED ATROPHY OF THYROID: ICD-10-CM

## 2018-02-15 DIAGNOSIS — E78.5 HYPERLIPIDEMIA LDL GOAL <130: ICD-10-CM

## 2018-02-15 DIAGNOSIS — Z91.81 AT RISK FOR FALLING: ICD-10-CM

## 2018-02-15 PROCEDURE — G0008 ADMIN INFLUENZA VIRUS VAC: HCPCS | Performed by: FAMILY MEDICINE

## 2018-02-15 PROCEDURE — 84443 ASSAY THYROID STIM HORMONE: CPT | Performed by: FAMILY MEDICINE

## 2018-02-15 PROCEDURE — 80061 LIPID PANEL: CPT | Performed by: FAMILY MEDICINE

## 2018-02-15 PROCEDURE — 36415 COLL VENOUS BLD VENIPUNCTURE: CPT | Performed by: FAMILY MEDICINE

## 2018-02-15 PROCEDURE — 90662 IIV NO PRSV INCREASED AG IM: CPT | Performed by: FAMILY MEDICINE

## 2018-02-15 PROCEDURE — 99214 OFFICE O/P EST MOD 30 MIN: CPT | Mod: 25 | Performed by: FAMILY MEDICINE

## 2018-02-15 RX ORDER — NEOMYCIN SULFATE, POLYMYXIN B SULFATE AND HYDROCORTISONE 10; 3.5; 1 MG/ML; MG/ML; [USP'U]/ML
4 SUSPENSION/ DROPS AURICULAR (OTIC) 3 TIMES DAILY PRN
Qty: 10 ML | Refills: 3 | Status: SHIPPED | OUTPATIENT
Start: 2018-02-15 | End: 2021-05-19

## 2018-02-15 RX ORDER — LEVOTHYROXINE SODIUM 75 UG/1
75 TABLET ORAL DAILY
Qty: 90 TABLET | Refills: 3 | Status: SHIPPED | OUTPATIENT
Start: 2018-02-15 | End: 2019-03-18

## 2018-02-15 NOTE — LETTER
[facility]  [address] [place]  [address]  [phone]        February 16, 2018    Argenis Marcos  [facility] [facility] [id number]        Dear Argenis,    The results of your recent lab tests were within normal limits. Enclosed is a copy of these results.  If you have any further questions or problems, please contact our office.    Results for orders placed or performed in visit on 02/15/18   Lipid panel reflex to direct LDL Fasting   Result Value Ref Range    Cholesterol 124 <200 mg/dL    Triglycerides 156 (H) <150 mg/dL    HDL Cholesterol 53 >49 mg/dL    LDL Cholesterol Calculated 40 <100 mg/dL    Non HDL Cholesterol 71 <130 mg/dL   TSH WITH FREE T4 REFLEX   Result Value Ref Range    TSH 0.76 0.40 - 4.00 mU/L       If you have any questions please call the clinic at [phone].    Sincerely,    Obey KISER

## 2018-02-15 NOTE — PROGRESS NOTES
"  SUBJECTIVE:   Argenis Marcos is a 72 year old female who presents to clinic today for the following health issues:      Left shoulder pain. Patient had sudden onset of left shoulder \"soreness\" upon awakening in the morning a few days before alton. It's \"much better\" today, and believes that she just slept \"awkwardly\". No peripheral motor deficits, weakness, numbness, or inciting injury.      Reviewed risk of falls and she is able to ambulate independently. She does use caution and does not have concerns for rugs bathroom or stairs.     Past/recent records reviewed and discussed for -- immunizations. Needs flu shot    She also needs update of refills of medications and surveillance of her hypothyroidism. No symptoms of concern for thyroid.     Has intermittent eczema of left ear     Problem list and histories reviewed & adjusted, as indicated.  Additional history: as documented    Patient Active Problem List   Diagnosis     Other disorder of menstruation and other abnormal bleeding from female genital tract     Esophageal reflux     Arthropathy     Cervicalgia     Toxic effect of venom(989.5)     Hypothyroidism     Benign neoplasm of skin of trunk, except scrotum     Pain in joint, lower leg     HYPERLIPIDEMIA LDL GOAL <130     Moderate major depression (H)     Advanced directives, counseling/discussion     Migraines     Vitreous syneresis     Osteoarthritis of hip - left     Chondromalacia, knee - left     Elevated LFTs     Muscle spasm     Cataracts, both eyes     PVD (posterior vitreous detachment), both eyes     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,, sequela     Past Surgical History:   Procedure Laterality Date     C NONSPECIFIC PROCEDURE      URETHRAL DILATATION     HC DILATION/CURETTAGE DIAG/THER NON OB  5/21/04    D & C,hysteroscopic resection of large submucosal fibroid       Social History   Substance Use Topics     Smoking status: Former Smoker     Smokeless tobacco: Never Used      Comment: quit in " the 70's     Alcohol use No     Family History   Problem Relation Age of Onset     Hypertension Mother      CANCER Mother      Glaucoma Mother      Hypertension Sister      Breast Cancer Sister      Hypertension Brother      HEART DISEASE Father      Lipids Father      DIABETES Father      Cancer - colorectal No family hx of      CEREBROVASCULAR DISEASE No family hx of      Thyroid Disease No family hx of      Macular Degeneration No family hx of          Current Outpatient Prescriptions   Medication Sig Dispense Refill     acetaminophen-codeine (TYLENOL #3) 300-30 MG per tablet TAKE 1 TABLET BY MOUTH THREE TIMES DAILY 30 tablet 0     simvastatin (ZOCOR) 40 MG tablet Take 1 tablet (40 mg) by mouth At Bedtime 90 tablet 3     rizatriptan (MAXALT-MLT) 5 MG ODT tab Take 1-2 tablets (5-10 mg) by mouth at onset of headache for migraine May repeat dose in 2 hours.  Do not exceed 30 mg in 24 hours 18 tablet 5     clotrimazole (LOTRIMIN) 1 % cream PLACE ONE APPLICATORFUL VAGINALLY DAILY AND APPLY EXTERNALLY TWICE DAILY AS DIRECTED 45 g 0     neomycin-polymyxin-hydrocortisone (CORTISPORIN) 3.5-68177-7 otic suspension Place 4 drops in ear(s) 3 times daily as needed 10 mL 0     levothyroxine (SYNTHROID/LEVOTHROID) 75 MCG tablet Take 1 tablet (75 mcg) by mouth daily 90 tablet 3     tiZANidine (ZANAFLEX) 2 MG tablet Take 1 tablet (2 mg) by mouth 3 times daily as needed for muscle spasms 90 tablet 5     EPINEPHrine 0.3 MG/0.3ML injection 2-pack Inject 0.3 mLs (0.3 mg) into the muscle once as needed for anaphylaxis 0.6 mL 3     clotrimazole (SM CLOTRIMAZOLE VAGINAL) 1 % vaginal cream Place 1 Applicatorful vaginally daily And apply externally bid 45 g 0     Miconazole Nitrate 2 % POWD 1 Application daily as needed 1 Bottle 3     polyethylene glycol 0.4%- propylene glycol 0.3% (SYSTANE ULTRA) 0.4-0.3 % SOLN ophthalmic solution Place 1 drop into both eyes as needed for dry eyes 1 Bottle 12     Omega-3 Fatty Acids (FISH OIL) 1200 MG  capsule Take 1 capsule by mouth daily.       DIPROLENE 0.05 % EX OINT apply to the affected area bid  60gm 6     CALCIUM 600 + D 600-200 MG-UNIT OR TABS 2 tabs daily  3     CRANBERRY 500 MG OR CAPS 1 CAPSULE 2 TIMES DAILY 90 0     ASPIRIN 81 MG OR TABS 1 tab po QD (Once per day) 100 3     Allergies   Allergen Reactions     Imitrex [Sumatriptan Succinate] Shortness Of Breath     Zomig [Zolmitriptan] Shortness Of Breath     Sulfa Drugs      Sumatriptan      Recent Labs   Lab Test  02/15/17   1220  10/31/16   1222   09/14/15   1207  08/31/15   1155  02/26/15   1230  10/02/14   1223   LDL   --   104*   --   80   --    --   51   HDL   --   43*   --   39*   --    --   43*   TRIG   --   305*   --   320*   --    --   223*   ALT   --   21   --    --   26  21  21   CR   --   1.00   --    --    --    --   1.01   GFRESTIMATED   --   55*   --    --    --    --   55*   GFRESTBLACK   --   66   --    --    --    --   66   POTASSIUM   --   3.9   --    --    --    --   3.9   TSH  0.66  7.15*   < >   --    --   0.95  5.81*    < > = values in this interval not displayed.      BP Readings from Last 3 Encounters:   02/15/18 100/76   09/18/17 114/78   02/15/17 122/76    Wt Readings from Last 3 Encounters:   02/15/18 67.6 kg (149 lb)   09/18/17 67.1 kg (148 lb)   02/15/17 68.9 kg (152 lb)                  Labs reviewed in EPIC    Reviewed and updated as needed this visit by clinical staff  Tobacco  Allergies  Med Hx  Surg Hx  Fam Hx  Soc Hx      Reviewed and updated as needed this visit by Provider         ROS:  Constitutional, HEENT, cardiovascular, pulmonary, GI, , musculoskeletal, neuro, skin, endocrine and psych systems are negative, except as otherwise noted.    This document serves as a record of the services and decisions personally performed and made by Carlos A Graves MD. It was created on their behalf by Candelario Spence, a trained medical scribe. The creation of this document is based the provider's statements to the medical  shelia  Candelario Spence February 15, 2018 12:25 PM       OBJECTIVE:     /76 (BP Location: Right arm, Cuff Size: Adult Regular)  Pulse 88  Temp 99.1  F (37.3  C) (Oral)  Wt 67.6 kg (149 lb)  BMI 22.49 kg/m2  Body mass index is 22.49 kg/(m^2).  GENERAL: healthy, alert and no distress  HENT: ear canals and TM's normal, nose and mouth without ulcers or lesions  NECK: no adenopathy, no asymmetry, masses, or scars and thyroid normal to palpation  RESP: lungs clear to auscultation - no rales, rhonchi or wheezes  CV: regular rate and rhythm, normal S1 S2, no S3 or S4, no murmur, click or rub  MS: no gross musculoskeletal defects noted, no edema, normal range of motion of bilateral upper extremities, 5/5 strength of upper extremities b/l.   SKIN: no suspicious lesions or rashes  PSYCH: mentation appears normal, affect normal/bright    Diagnostic Test Results:  none     ASSESSMENT/PLAN:   (M16.12) Primary osteoarthritis of left hip  (primary encounter diagnosis)  Comment: stable with the use of tylenol#3, no concerns for abuse. Ok to refill.   Plan: acetaminophen-codeine (TYLENOL #3) 300-30 MG         per tablet        -medications refilled, continue present medications        -follow up in four months    (Z91.81) At risk for falling  Comment: secondary to OA  Plan: -continue to use walker for ambulation    (E03.4) Hypothyroidism due to acquired atrophy of thyroid  Comment: controlled. Labs will be rechecked today.   Plan: TSH WITH FREE T4 REFLEX, levothyroxine         (SYNTHROID/LEVOTHROID) 75 MCG tablet        -follow up, pending results    (E78.5) Hyperlipidemia LDL goal <130  Comment: LDL at 104 on 10/31/2016; stable  Plan: Lipid panel reflex to direct LDL Fasting        -medications refilled, continue present medications    (H60.8X2) Chronic eczematous otitis externa of left ear  Comment: Stable. Needs medications refilled.   Plan: neomycin-polymyxin-hydrocortisone (CORTISPORIN)        3.5-50805-4 otic  suspension        -medications refilled, continue present medications      The information in this document, created by the medical scribe for me, accurately reflects the services I personally performed and the decisions made by me. I have reviewed and approved this document for accuracy prior to leaving the patient care area.    Obey Graves MD, MD  [facility]

## 2018-02-15 NOTE — PATIENT INSTRUCTIONS
Orders Placed This Encounter     Lipid panel reflex to direct LDL Fasting     Last Lab Result: LDL Cholesterol Calculated (mg/dL)       Date                     Value                 10/31/2016               104 (H)          ----------     Order Specific Question:   Perform labs while fasting or non-fasting?     Answer:   Fasting     TSH WITH FREE T4 REFLEX     Last Lab Result: TSH (mU/L)       Date                     Value                 02/15/2017               0.66             ----------     acetaminophen-codeine (TYLENOL #3) 300-30 MG per tablet     Sig: Take 1 tablet by mouth 3 times daily     Dispense:  90 tablet     Refill:  3     levothyroxine (SYNTHROID/LEVOTHROID) 75 MCG tablet     Sig: Take 1 tablet (75 mcg) by mouth daily     Dispense:  90 tablet     Refill:  3     neomycin-polymyxin-hydrocortisone (CORTISPORIN) 3.5-54861-1 otic suspension     Sig: Place 4 drops into both ears 3 times daily as needed     Dispense:  10 mL     Refill:  3     -consider getting new formulation of shingles vaccine     [facility]   Discharged by : Romana SWANSON Certified Medical Assistant (AAMA)   Paper scripts provided to patient : 1   If you have any questions regarding to your visit please contact your care team:     Team Silver              Clinic Hours Telephone Number     Dr. Carlos A Houser PA-C   7am-7pm  Monday - Thursday   7am-5pm  Fridays  [phone]   (Appointment scheduling available 24/7)     RN Line  [phone] option 2     Urgent Care - [facility] and [place] [facility] - 11am-9pm Monday-Friday Saturday-Sunday- 9am-5pm     [place] -   5pm-9pm Monday-Friday Saturday-Sunday- 9am-5pm    [phone] - [facility]    [phone] - [place]     For a Price Quote for your services, please call our Consumer Price Line at [phone].     What options do I have for visits at the clinic other than the traditional office visit?     To  expand how we care for you, many of our providers are utilizing electronic visits (e-visits) and telephone visits, when medically appropriate, for interactions with their patients rather than a visit in the clinic. We also offer nurse visits for many medical concerns. Just like any other service, we will bill your insurance company for this type of visit based on time spent on the phone with your provider. Not all insurance companies cover these visits. Please check with your medical insurance if this type of visit is covered. You will be responsible for any charges that are not paid by your insurance.   E-visits via [employer]: generally incur a $35.00 fee.     Telephone visits:   Time spent on the phone: *charged based on time that is spent on the phone in increments of 10 minutes. Estimated cost:   5-10 mins $30.00   11-20 mins. $59.00   21-30 mins. $85.00     Use [employer] (secure email communication and access to your chart) to send your primary care provider a message or make an appointment. Ask someone on your Team how to sign up for [employer].     As always, Thank you for trusting us with your health care needs!      [facility]:    Scheduling Appointments  [facility]  Call: [phone]    [facility]  Call: [phone]    [facility]  Call: [phone]    For Gastroenterology referrals   [facility]   Clinics and Surgery Center, 4th Floor   [address] [place] [address]   Appointments: [phone]    WHERE TO GO FOR CARE?  Clinic    Make an appointment if you:       Are sick (cold, cough, flu, sore throat, earache or in pain).       Have a small injury (sprain, small cut, burn or broken bone).       Need a physical exam, Pap smear, vaccine or prescription refill.       Have questions about your health or medicines.    To reach us:      Call [phone] ([phone]). Open 24 hours every  day. (For counseling services, call [phone].)    Log into [email or web address]. (Visit [email or web address] to create an account.) Hospital emergency room    An emergency is a serious or life- threatening problem that must be treated right away.    Call 911 or get to the hospital if you have:      Very bad or sudden:            - Chest pain or pressure         - Bleeding         - Head or belly pain         - Dizziness or trouble seeing, walking or                          Speaking      Problems breathing      Blood in your vomit or you are coughing up blood      A major injury (knocked out, loss of a finger or limb, rape, broken bone protruding from skin)    A mental health crisis. (Or call the Mental Health Crisis line at 1-344.951.3700 or Suicide Prevention Hotline at 1-613.975.1141.)    Open 24 hours every day. You don't need an appointment.     Urgent care    Visit urgent care for sickness or small injuries when the clinic is closed. You don't need an appointment. To check hours or find an urgent care near you, visit [email or web address][email or web address]. Online care    Get online care from [facility] for more than 70 common problems, like colds, allergies and infections. Open 24 hours every day at:   [email or web address]   Need help deciding?    For advice about where to be seen, you may call your clinic and ask to speak with a nurse. We're here for you 24 hours every day.         If you are deaf or hard of hearing, please let us know. We provide many free services including sign language interpreters, oral interpreters, TTYs, telephone amplifiers, note takers and written materials.

## 2018-02-15 NOTE — MR AVS SNAPSHOT
After Visit Summary   2/15/2018    Argenis Marcos    MRN: [id number]           Patient Information     Date Of Birth          1945        Visit Information        Provider Department      2/15/2018 12:00 PM Obey Graves MD [facility]        Today's Diagnoses     Primary osteoarthritis of left hip    -  1    At risk for falling        Hypothyroidism due to acquired atrophy of thyroid        Hyperlipidemia LDL goal <130        Chronic eczematous otitis externa of left ear          Care Instructions    Orders Placed This Encounter     Lipid panel reflex to direct LDL Fasting     Last Lab Result: LDL Cholesterol Calculated (mg/dL)       Date                     Value                 10/31/2016               104 (H)          ----------     Order Specific Question:   Perform labs while fasting or non-fasting?     Answer:   Fasting     TSH WITH FREE T4 REFLEX     Last Lab Result: TSH (mU/L)       Date                     Value                 02/15/2017               0.66             ----------     acetaminophen-codeine (TYLENOL #3) 300-30 MG per tablet     Sig: Take 1 tablet by mouth 3 times daily     Dispense:  90 tablet     Refill:  3     levothyroxine (SYNTHROID/LEVOTHROID) 75 MCG tablet     Sig: Take 1 tablet (75 mcg) by mouth daily     Dispense:  90 tablet     Refill:  3     neomycin-polymyxin-hydrocortisone (CORTISPORIN) 3.5-64677-8 otic suspension     Sig: Place 4 drops into both ears 3 times daily as needed     Dispense:  10 mL     Refill:  3     -consider getting new formulation of shingles vaccine     [facility]   Discharged by : Romana SWANSNO Certified Medical Assistant (AAMA)   Paper scripts provided to patient : 1   If you have any questions regarding to your visit please contact your care team:     Team Silver              Clinic Hours Telephone Number     Dr. Carlos A Houser PA-C   7am-7pm  Monday  - Thursday   7am-5pm  Fridays  [phone]   (Appointment scheduling available 24/7)     RN Line  [phone] option 2     Urgent Care - [place] and [place] - 11am-9pm Monday-Friday Saturday-Sunday- 9am-5pm     [place] -   5pm-9pm Monday-Friday Saturday-Sunday- 9am-5pm    [phone] - [place]    [phone] - [place]     For a Price Quote for your services, please call our Consumer Price Line at [phone].     What options do I have for visits at the clinic other than the traditional office visit?     To expand how we care for you, many of our providers are utilizing electronic visits (e-visits) and telephone visits, when medically appropriate, for interactions with their patients rather than a visit in the clinic. We also offer nurse visits for many medical concerns. Just like any other service, we will bill your insurance company for this type of visit based on time spent on the phone with your provider. Not all insurance companies cover these visits. Please check with your medical insurance if this type of visit is covered. You will be responsible for any charges that are not paid by your insurance.   E-visits via Wordy: generally incur a $35.00 fee.     Telephone visits:   Time spent on the phone: *charged based on time that is spent on the phone in increments of 10 minutes. Estimated cost:   5-10 mins $30.00   11-20 mins. $59.00   21-30 mins. $85.00     Use HQ plust (secure email communication and access to your chart) to send your primary care provider a message or make an appointment. Ask someone on your Team how to sign up for Wordy.     As always, Thank you for trusting us with your health care needs!      [facility]:    Scheduling Appointments  [facility]  Call: [phone]    [facility]  Call: [phone]    [facility]  Call: [phone]    For Gastroenterology  [facility]   Clinics and Surgery Center, 4th Floor   [address] [place] [address]   Appointments: [phone]    WHERE TO GO FOR CARE?  Clinic    Make an appointment if you:       Are sick (cold, cough, flu, sore throat, earache or in pain).       Have a small injury (sprain, small cut, burn or broken bone).       Need a physical exam, Pap smear, vaccine or prescription refill.       Have questions about your health or medicines.    To reach us:      Call [phone] ([phone]). Open 24 hours every day. (For counseling services, call [phone].)    Log into [employer] at [employer]. (Visit [employer] to create an account.) Hospital emergency room    An emergency is a serious or life- threatening problem that must be treated right away.    Call 911 or get to the hospital if you have:      Very bad or sudden:            - Chest pain or pressure         - Bleeding         - Head or belly pain         - Dizziness or trouble seeing, walking or                          Speaking      Problems breathing      Blood in your vomit or you are coughing up blood      A major injury (knocked out, loss of a finger or limb, rape, broken bone protruding from skin)    A mental health crisis. (Or call the Mental Health Crisis line at 1-331.580.1772 or Suicide Prevention Hotline at 1-671.258.4019.)    Open 24 hours every day. You don't need an appointment.     Urgent care    Visit urgent care for sickness or small injuries when the clinic is closed. You don't need an appointment. To check hours or find an urgent care near you, visit [email or web address]. Online care    Get online care from [facility] for more than 70 common problems, like colds, allergies and infections. Open 24 hours every day at:   [email or web address]   Need help deciding?    For advice about where to be seen, you may call your clinic and ask to speak with a nurse. We're here for you 24 hours every day.  "        If you are deaf or hard of hearing, please let us know. We provide many free services including sign language interpreters, oral interpreters, TTYs, telephone amplifiers, note takers and written materials.               Follow-ups after your visit        Your next 10 appointments already scheduled     2018 11:40 AM CST   [facility] Visit with Hyacinth Sim OD   [facility]    [address] [place] [address]  [place] [address] [phone]              Who to contact     If you have questions or need follow up information about today's clinic visit or your schedule please contact [facility] directly at [phone].  Normal or non-critical lab and imaging results will be communicated to you by MyChart, letter or phone within 4 business days after the clinic has received the results. If you do not hear from us within 7 days, please contact the clinic through MyChart or phone. If you have a critical or abnormal lab result, we will notify you by phone as soon as possible.  Submit refill requests through [employer] or call your pharmacy and they will forward the refill request to us. Please allow 3 business days for your refill to be completed.          Additional Information About Your Visit        [employer] Information     [employer] lets you send messages to your doctor, view your test results, renew your prescriptions, schedule appointments and more. To sign up, go to [email or web address] . Click on \"Log in\" on the left side of the screen, which will take you to the Welcome page. Then click on \"Sign up Now\" on the right side of the page.     You will be asked to enter the access code listed below, as well as some personal information. Please follow the directions to create your username and password.     Your access code is: [email or web address]  Expires: 2018 12:39 PM     Your access code will  in 90 days. If you need help or a new code, please call " your [facility] clinic or [phone].        Care EveryWhere ID     This is your Care EveryWhere ID. This could be used by other organizations to access your [facility] medical records  [id number]        Your Vitals Were     Pulse Temperature BMI (Body Mass Index)             88 99.1  F (37.3  C) (Oral) 22.49 kg/m2          Blood Pressure from Last 3 Encounters:   02/15/18 100/76   09/18/17 114/78   02/15/17 122/76    Weight from Last 3 Encounters:   02/15/18 149 lb (67.6 kg)   09/18/17 148 lb (67.1 kg)   02/15/17 152 lb (68.9 kg)              We Performed the Following     Lipid panel reflex to direct LDL Fasting     TSH WITH FREE T4 REFLEX          Today's Medication Changes          These changes are accurate as of 2/15/18 12:39 PM.  If you have any questions, ask your nurse or doctor.               Start taking these medicines.        Dose/Directions    acetaminophen-codeine 300-30 MG per tablet   Commonly known as:  TYLENOL #3   Used for:  Primary osteoarthritis of left hip   Started by:  Obey Graves MD        Dose:  1 tablet   Take 1 tablet by mouth 3 times daily   Quantity:  90 tablet   Refills:  3         These medicines have changed or have updated prescriptions.        Dose/Directions    neomycin-polymyxin-hydrocortisone 3.5-39993-9 otic suspension   Commonly known as:  CORTISPORIN   This may have changed:  how to take this   Used for:  Chronic eczematous otitis externa of left ear   Changed by:  Obey Graves MD        Dose:  4 drop   Place 4 drops into both ears 3 times daily as needed   Quantity:  10 mL   Refills:  3            Where to get your medicines      These medications were sent to [facility] [address] [place], [place] - [address] AT [facility] [address]  [address], [facility] [address]     Phone:  [phone]     levothyroxine 75 MCG tablet    neomycin-polymyxin-hydrocortisone 3.5-76523-2 otic suspension         Some of these will need a  paper prescription and others can be bought over the counter.  Ask your nurse if you have questions.     Bring a paper prescription for each of these medications     acetaminophen-codeine 300-30 MG per tablet                Primary Care Provider Office Phone # Fax #    Obey Graves [phone] [address] [facility] [address]        Equal Access to Services     AMADOR ARREDONDO : Hadii aad ku hadasho Soomaali, waaxda luqadaha, qaybta kaalmada adeegyada, waxay idiin hayaan adeeg kharash la'aan . So [facility] [phone].    ATENCIÓN: Si habla español, tiene a arauz disposición servicios gratuitos de asistencia lingüística. Llame al [phone].    We comply with applicable federal civil rights laws and [place] laws. We do not discriminate on the basis of race, color, national origin, age, disability, sex, sexual orientation, or gender identity.            Thank you!     Thank you for choosing [facility]  for your care. Our goal is always to provide you with excellent care. Hearing back from our patients is one way we can continue to improve our services. Please take a few minutes to complete the written survey that you may receive in the mail after your visit with us. Thank you!             Your Updated Medication List - Protect others around you: Learn how to safely use, store and throw away your medicines at www.disposemymeds.org.          This list is accurate as of 2/15/18 12:39 PM.  Always use your most recent med list.                   Brand Name Dispense Instructions for use Diagnosis    acetaminophen-codeine 300-30 MG per tablet    TYLENOL #3    90 tablet    Take 1 tablet by mouth 3 times daily    Primary osteoarthritis of left hip       aspirin 81 MG tablet     100    1 tab po QD (Once per day)        CALCIUM 600 + D 600-200 MG-UNIT Tabs      2 tabs daily    Routine general medical examination at a health care facility       * clotrimazole 1 % cream     SM CLOTRIMAZOLE VAGINAL    45 g    Place 1 Applicatorful vaginally daily And apply externally bid    Vaginal itching       * clotrimazole 1 % cream    LOTRIMIN    45 g    PLACE ONE APPLICATORFUL VAGINALLY DAILY AND APPLY EXTERNALLY TWICE DAILY AS DIRECTED    Vaginal itching       Cranberry 500 MG Caps     90    1 CAPSULE 2 TIMES DAILY        DIPROLENE 0.05 % ointment   Generic drug:  augmented betamethasone dipropionate     60gm     apply to the affected area bid    Contact dermatitis and other eczema, due to unspecified cause       EPINEPHrine 0.3 MG/0.3ML injection 2-pack    EPIPEN/ADRENACLICK/or ANY BX GENERIC EQUIV    0.6 mL    Inject 0.3 mLs (0.3 mg) into the muscle once as needed for anaphylaxis        levothyroxine 75 MCG tablet    SYNTHROID/LEVOTHROID    90 tablet    Take 1 tablet (75 mcg) by mouth daily    Hypothyroidism due to acquired atrophy of thyroid       Miconazole Nitrate 2 % Powd     1 Bottle    1 Application daily as needed    Candidal intertrigo       neomycin-polymyxin-hydrocortisone 3.5-25505-9 otic suspension    CORTISPORIN    10 mL    Place 4 drops into both ears 3 times daily as needed    Chronic eczematous otitis externa of left ear       omega-3 fatty acids 1200 MG capsule      Take 1 capsule by mouth daily.        polyethylene glycol 0.4%- propylene glycol 0.3% 0.4-0.3 % Soln ophthalmic solution    SYSTANE ULTRA    1 Bottle    Place 1 drop into both eyes as needed for dry eyes    Dry eyes, bilateral       rizatriptan 5 MG ODT tab    MAXALT-MLT    18 tablet    Take 1-2 tablets (5-10 mg) by mouth at onset of headache for migraine May repeat dose in 2 hours.  Do not exceed 30 mg in 24 hours    Migraine with aura and without status migrainosus, not intractable       simvastatin 40 MG tablet    ZOCOR    90 tablet    Take 1 tablet (40 mg) by mouth At Bedtime    Hyperlipidemia LDL goal <130       tiZANidine 2 MG tablet    ZANAFLEX    90 tablet    Take 1 tablet (2 mg) by mouth 3 times daily as  needed for muscle spasms    Muscle spasm       * Notice:  This list has 2 medication(s) that are the same as other medications prescribed for you. Read the directions carefully, and ask your doctor or other care provider to review them with you.

## 2018-02-15 NOTE — PROGRESS NOTES

## 2018-02-16 LAB
CHOLEST SERPL-MCNC: 124 MG/DL
HDLC SERPL-MCNC: 53 MG/DL
LDLC SERPL CALC-MCNC: 40 MG/DL
NONHDLC SERPL-MCNC: 71 MG/DL
TRIGL SERPL-MCNC: 156 MG/DL
TSH SERPL DL<=0.005 MIU/L-ACNC: 0.76 MU/L (ref 0.4–4)

## 2018-05-31 ENCOUNTER — TELEPHONE (OUTPATIENT)
Dept: FAMILY MEDICINE | Facility: CLINIC | Age: 73
End: 2018-05-31

## 2018-05-31 DIAGNOSIS — N89.8 VAGINAL ITCHING: ICD-10-CM

## 2018-05-31 DIAGNOSIS — B37.2 CANDIDAL INTERTRIGO: ICD-10-CM

## 2018-05-31 RX ORDER — MICONAZOLE NITRATE
1 POWDER (GRAM) MISCELLANEOUS DAILY PRN
Qty: 1 BOTTLE | Refills: 3 | Status: SHIPPED | OUTPATIENT
Start: 2018-05-31 | End: 2019-07-22

## 2018-05-31 RX ORDER — CLOTRIMAZOLE 1 %
CREAM WITH APPLICATOR VAGINAL
Qty: 45 G | Refills: 0 | Status: SHIPPED | OUTPATIENT
Start: 2018-05-31 | End: 2019-06-10

## 2018-05-31 NOTE — TELEPHONE ENCOUNTER
Reason for Call:  Other     Detailed comments: Patient said that she is experiencing a vaginal rash on the outside and would like the cream she has had in the past to be prescribed. Also experiencing foot issues and has had a powder for this condition. [facility]      Phone Number Patient can be reached at: Cell number on file:    Telephone Information:   Mobile [phone]       Best Time:     Can we leave a detailed message on this number? Not Applicable    Call taken on 5/31/2018 at 10:09 AM by Selena Watson

## 2018-05-31 NOTE — TELEPHONE ENCOUNTER
"Phone call to patient.  Reports vaginal rash that reoccurs every summer in warmer weather.  Denies pain, itchiness, odor, discharge, fever or other concerns.  Does note redness in her external vaginal area.    Contrary to note below, patient is not having foot problem.  Powder is also for vaginal rash to \"keep area dry\" and prevent rash.    Prescriptions sent as requested.    Reviewed warning signs and when to seek urgent medical attention.  Patient verbalized understanding.    Aramis Negron RN    "

## 2018-05-31 NOTE — TELEPHONE ENCOUNTER
Forward to PCP for review.  Would you prefer patient schedules office visit/telephone visit? No MyChart/e-visit option.    Or okay for prescriptions without appointment?  I do see clotrimazole cream prescribed in the past for mentioned condition.  Also note miconazole powder.    Aramis Negron RN

## 2018-06-25 ENCOUNTER — OFFICE VISIT (OUTPATIENT)
Dept: FAMILY MEDICINE | Facility: CLINIC | Age: 73
End: 2018-06-25
Payer: COMMERCIAL

## 2018-06-25 VITALS
BODY MASS INDEX: 21.67 KG/M2 | SYSTOLIC BLOOD PRESSURE: 102 MMHG | DIASTOLIC BLOOD PRESSURE: 66 MMHG | TEMPERATURE: 98.2 F | HEART RATE: 98 BPM | WEIGHT: 143 LBS | HEIGHT: 68 IN

## 2018-06-25 DIAGNOSIS — G43.109 MIGRAINE WITH AURA AND WITHOUT STATUS MIGRAINOSUS, NOT INTRACTABLE: ICD-10-CM

## 2018-06-25 DIAGNOSIS — E78.5 HYPERLIPIDEMIA LDL GOAL <130: ICD-10-CM

## 2018-06-25 DIAGNOSIS — Z23 NEED FOR SHINGLES VACCINE: ICD-10-CM

## 2018-06-25 DIAGNOSIS — M16.12 PRIMARY OSTEOARTHRITIS OF LEFT HIP: Primary | ICD-10-CM

## 2018-06-25 DIAGNOSIS — M62.838 MUSCLE SPASM: ICD-10-CM

## 2018-06-25 DIAGNOSIS — Z91.030 ALLERGY TO BEE STING: ICD-10-CM

## 2018-06-25 DIAGNOSIS — Z13.5 SCREENING FOR DIABETIC RETINOPATHY: ICD-10-CM

## 2018-06-25 PROCEDURE — 99214 OFFICE O/P EST MOD 30 MIN: CPT | Mod: 25 | Performed by: FAMILY MEDICINE

## 2018-06-25 PROCEDURE — 90750 HZV VACC RECOMBINANT IM: CPT | Performed by: FAMILY MEDICINE

## 2018-06-25 PROCEDURE — 90471 IMMUNIZATION ADMIN: CPT | Performed by: FAMILY MEDICINE

## 2018-06-25 RX ORDER — TIZANIDINE 2 MG/1
TABLET ORAL
Qty: 270 TABLET | Refills: 5 | OUTPATIENT
Start: 2018-06-25

## 2018-06-25 RX ORDER — EPINEPHRINE 0.3 MG/.3ML
0.3 INJECTION SUBCUTANEOUS
Qty: 0.6 ML | Refills: 3 | Status: SHIPPED | OUTPATIENT
Start: 2018-06-25 | End: 2019-04-08

## 2018-06-25 RX ORDER — TIZANIDINE 2 MG/1
2 TABLET ORAL 3 TIMES DAILY PRN
Qty: 90 TABLET | Refills: 5 | Status: SHIPPED | OUTPATIENT
Start: 2018-06-25 | End: 2019-07-22

## 2018-06-25 RX ORDER — SIMVASTATIN 40 MG
40 TABLET ORAL AT BEDTIME
Qty: 90 TABLET | Refills: 3 | Status: SHIPPED | OUTPATIENT
Start: 2018-06-25 | End: 2019-07-22

## 2018-06-25 RX ORDER — RIZATRIPTAN BENZOATE 5 MG/1
5-10 TABLET, ORALLY DISINTEGRATING ORAL
Qty: 18 TABLET | Refills: 5 | Status: SHIPPED | OUTPATIENT
Start: 2018-06-25 | End: 2019-07-22

## 2018-06-25 NOTE — PROGRESS NOTES
SUBJECTIVE:   Argenis Marcos is a 72 year old female who presents to clinic today for the following health issues:      Hyperlipidemia Follow-Up      Rate your low fat/cholesterol diet?: not monitoring fat    Taking statin?  Yes, no muscle aches from statin    Other lipid medications/supplements?:  Fish oil/Omega 3, dose 1200 without side effects    Hypothyroidism Follow-up      Since last visit, patient describes the following symptoms: Weight stable, no hair loss, no skin changes, no constipation, no loose stools    Chronic Pain Follow-Up       Type / Location of Pain: both hips and knees   Analgesia/pain control:       Recent changes:  worse      Overall control: Tolerable with discomfort  Activity level/function:      Daily activities:  Able to do light housework, cooking    Work:  not applicable  Adverse effects:  No  Adherance    Taking medication as directed?  Yes    Participating in other treatments: would like to discuss physical therapy   Risk Factors:    Sleep:  Good    Mood/anxiety:  controlled    Recent family or social stressors:  none noted    Other aggravating factors: pain is getting worse in the evenings   PHQ-9 SCORE 2/26/2015 8/31/2015 10/31/2016   Total Score 0 - -   Total Score - 2 0     No flowsheet data found.  Encounter-Level CSA:     There are no encounter-level csa.          Amount of exercise or physical activity: None    Problems taking medications regularly: No    Medication side effects: none    Diet: regular (no restrictions)        Patient considering PT but has new exercise machine at home that is low impact. Peddling. She would like to try this first before going to PT    She also needs refill of meds. Has bee sting allergies. Needs refill of EPI pen    Review shingles vaccine and she will get here today  Problem list and histories reviewed & adjusted, as indicated.  Additional history: as documented    BP Readings from Last 3 Encounters:   06/25/18 102/66   02/15/18 100/76  "  09/18/17 114/78    Wt Readings from Last 3 Encounters:   06/25/18 143 lb (64.9 kg)   02/15/18 149 lb (67.6 kg)   09/18/17 148 lb (67.1 kg)                    Reviewed and updated as needed this visit by clinical staff  Tobacco  Allergies  Meds  Med Hx  Surg Hx  Fam Hx  Soc Hx      Reviewed and updated as needed this visit by Provider         ROS:  CONSTITUTIONAL: NEGATIVE for fever, chills, change in weight  ENT/MOUTH: NEGATIVE for ear, mouth and throat problems  RESP: NEGATIVE for significant cough or SOB  CV: NEGATIVE for chest pain, palpitations or peripheral edema  MUSCULOSKELETAL: POSITIVE  for arthralgias knees and hip  ENDOCRINE: NEGATIVE for temperature intolerance, skin/hair changes  HEME/ALLERGY/IMMUNE: NEGATIVE for bleeding problems  PSYCHIATRIC: NEGATIVE for changes in mood or affect    OBJECTIVE:     /66  Pulse 98  Temp 98.2  F (36.8  C) (Oral)  Ht 5' 8.25\" (1.734 m)  Wt 143 lb (64.9 kg)  Breastfeeding? No  BMI 21.58 kg/m2  Body mass index is 21.58 kg/(m^2).   GENERAL: alert and no distress  NECK: no adenopathy, no asymmetry, masses, or scars and thyroid normal to palpation  RESP: lungs clear to auscultation - no rales, rhonchi or wheezes  CV: regular rate and rhythm, normal S1 S2, no S3 or S4, no murmur, click or rub, no peripheral edema and peripheral pulses strong  ABDOMEN: soft, nontender, no hepatosplenomegaly, no masses and bowel sounds normal  MS: tenderness to palpation and decreased ROM of knees bilateral. Ambulates with some difficulty. Uses cane  NEURO: Normal strength and tone, mentation intact and speech normal  PSYCH: mentation appears normal, affect normal/bright    Diagnostic Test Results:  none     ASSESSMENT:       PLAN:   (M16.12) Primary osteoarthritis of left hip  (primary encounter diagnosis)  Comment: ok to refill consider PT  Plan: acetaminophen-codeine (TYLENOL #3) 300-30 MG         per tablet                (G43.109) Migraine with aura and without status " migrainosus, not intractable  Comment: stable  Plan: rizatriptan (MAXALT-MLT) 5 MG ODT tab        refill    (E78.5) Hyperlipidemia LDL goal <130  Comment: stable  Plan: simvastatin (ZOCOR) 40 MG tablet        refill    (M62.838) Muscle spasm  Comment: uses intermitently  Plan: tiZANidine (ZANAFLEX) 2 MG tablet        refill    (Z23) Need for shingles vaccine  Comment: reviewed risks and benefits.  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  Do not exceed 30 mg in 24 hours     Dispense:  18 tablet     Refill:  5     simvastatin (ZOCOR) 40 MG tablet     Sig: Take 1 tablet (40 mg) by mouth At Bedtime     Dispense:  90 tablet     Refill:  3     tiZANidine (ZANAFLEX) 2 MG tablet     Sig: Take 1 tablet (2 mg) by mouth 3 times daily as needed for muscle spasms     Dispense:  90 tablet     Refill:  5     EPINEPHrine (EPIPEN/ADRENACLICK/OR ANY BX GENERIC EQUIV) 0.3 MG/0.3ML injection 2-pack     Sig: Inject 0.3 mLs (0.3 mg) into the muscle once as needed for anaphylaxis     Dispense:  0.6 mL     Refill:  3     [facility]   Discharged by : Eunice MARTINEZ CMA ([facility])    Paper scripts provided to patient : Tylenol #3   If you have any questions regarding to your visit please contact your care team:     Team [facility]              Clinic Hours Telephone Number     Dr. Carlos A Houser PA-C   7am-7pm  Monday  - Thursday   7am-5pm  Fridays  [phone]   (Appointment scheduling available 24/7)     RN Line  [phone] option 2     Urgent Care - [place] and [place] - 11am-9pm Monday-Friday Saturday-Sunday- 9am-5pm     [place] -   5pm-9pm Monday-Friday Saturday-Sunday- 9am-5pm    [phone] - [place]    [phone] - [place]     For a Price Quote for your services, please call our Consumer Price Line at [phone].     What options do I have for visits at the clinic other than the traditional office visit?     To expand how we care for you, many of our providers are utilizing electronic visits (e-visits) and telephone visits, when medically appropriate, for interactions with their patients rather than a visit in the clinic. We also offer nurse visits for many medical concerns. Just like any other service, we will bill your insurance company for this type of visit based on time spent on the phone with your provider. Not all insurance companies cover these visits. Please check with your medical insurance if this type of visit is covered. You will be responsible for any charges that are not paid by your insurance.   E-visits via [employer]: generally incur a $35.00 fee.     Telephone visits:   Time spent on the phone: *charged based on time that is spent on the phone in increments of 10 minutes. Estimated cost:   5-10 mins $30.00   11-20 mins. $59.00   21-30 mins. $85.00     Use [employer] (secure email communication and access to your chart) to send your primary care provider a message or make an appointment. Ask someone on your Team how to sign up for [employer].     As always, Thank you for trusting us with your health care needs!      [facility]:    Scheduling Appointments  [facility]  Call: [phone]    [facility]  Call: [phone]    [facility]  Call: [phone]    For Gastroenterology  [facility]   [facility] and [facility] Center, 4th Floor   [address] [place] [address]   Appointments: [phone]    WHERE TO GO FOR CARE?  Clinic    Make an appointment if you:       Are sick (cold, cough, flu, sore throat, earache or in pain).       Have a small injury (sprain, small cut, burn or broken bone).       Need a physical exam, Pap smear, vaccine or prescription refill.       Have questions about your health or medicines.    To reach us:      Call [phone] ([phone]). Open 24 hours every day. (For counseling services, call [phone].)    Log into [employer] at [employer]. (Visit [employer] to create an account.) Hospital emergency room    An emergency is a serious or life- threatening problem that must be treated right away.    Call 911 or get to the hospital if you have:      Very bad or sudden:            - Chest pain or pressure         - Bleeding         - Head or belly pain         - Dizziness or trouble seeing, walking or                          Speaking      Problems breathing      Blood in your vomit or you are coughing up blood      A major injury (knocked out, loss of a finger or limb, rape, broken bone protruding from skin)    A mental health crisis. (Or call the Mental Health Crisis line at 1-788.991.5299 or Suicide Prevention Hotline at 1-956.822.9580.)    Open 24 hours every day. You don't need an appointment.     Urgent care    Visit urgent care for sickness or small injuries when the clinic is closed. You don't need an appointment. To check hours or find an urgent care near you, visit [email or web address]. Online care    Get online care from [facility] for more than 70 common problems, like colds, allergies and infections. Open 24 hours every day at:   [email or web address]   Need help deciding?    For advice about where to be seen, you may call your clinic and ask to speak with a nurse. We're here for you 24 hours every day.          If you are deaf or hard of hearing, please let us know. We provide many free services including sign language interpreters, oral interpreters, TTYs, telephone amplifiers, note takers and written materials.           Obey Graves MD, MD  [facility]

## 2018-06-25 NOTE — NURSING NOTE
Screening Questionnaire for Adult Immunization    Are you sick today?   No   Do you have allergies to medications, food, a vaccine component or latex?   No   Have you ever had a serious reaction after receiving a vaccination?   No   Do you have a long-term health problem with heart disease, lung disease, asthma, kidney disease, metabolic disease (e.g. diabetes), anemia, or other blood disorder?   No   Do you have cancer, leukemia, HIV/AIDS, or any other immune system problem?   No   In the past 3 months, have you taken medications that affect  your immune system, such as prednisone, other steroids, or anticancer drugs; drugs for the treatment of rheumatoid arthritis, Crohn s disease, or psoriasis; or have you had radiation treatments?   No   Have you had a seizure, or a brain or other nervous system problem?   No   During the past year, have you received a transfusion of blood or blood     products, or been given immune (gamma) globulin or antiviral drug?   No   For women: Are you pregnant or is there a chance you could become        pregnant during the next month?   No   Have you received any vaccinations in the past 4 weeks?   No     Immunization questionnaire answers were all negative.        Per orders of Dr. Graves, injection of SHINGRIX given by Eunice De Leon. Patient instructed to remain in clinic for 15 minutes afterwards, and to report any adverse reaction to me immediately.       Screening performed by Eunice De Leon on 6/25/2018 at 2:29 PM.

## 2018-06-25 NOTE — TELEPHONE ENCOUNTER
Huddled with PCP, patient saw him today and she says she does not use this medication daily. Refused with note to pharmacy.    Junior Pickett RN

## 2018-06-25 NOTE — MR AVS SNAPSHOT
After Visit Summary   6/25/2018    Argenis Marcos    MRN: [id number]           Patient Information     Date Of Birth          1945        Visit Information        Provider Department      6/25/2018 1:40 PM Obey Graves MD [facility]        Today's Diagnoses     Primary osteoarthritis of left hip    -  1    Screening for diabetic retinopathy        Migraine with aura and without status migrainosus, not intractable        Hyperlipidemia LDL goal <130        Muscle spasm        Need for shingles vaccine        Allergy to bee sting          Care Instructions    Orders Placed This Encounter     ZOSTER VACCINE RECOMBINANT ADJUVANTED IM NJX     acetaminophen-codeine (TYLENOL #3) 300-30 MG per tablet     Sig: Take 1 tablet by mouth 3 times daily     Dispense:  90 tablet     Refill:  3     rizatriptan (MAXALT-MLT) 5 MG ODT tab     Sig: Take 1-2 tablets (5-10 mg) by mouth at onset of headache for migraine May repeat dose in 2 hours.  Do not exceed 30 mg in 24 hours     Dispense:  18 tablet     Refill:  5     simvastatin (ZOCOR) 40 MG tablet     Sig: Take 1 tablet (40 mg) by mouth At Bedtime     Dispense:  90 tablet     Refill:  3     tiZANidine (ZANAFLEX) 2 MG tablet     Sig: Take 1 tablet (2 mg) by mouth 3 times daily as needed for muscle spasms     Dispense:  90 tablet     Refill:  5     EPINEPHrine (EPIPEN/ADRENACLICK/OR ANY BX GENERIC EQUIV) 0.3 MG/0.3ML injection 2-pack     Sig: Inject 0.3 mLs (0.3 mg) into the muscle once as needed for anaphylaxis     Dispense:  0.6 mL     Refill:  3     [facility]   Discharged by : Eunice MARTINEZ CMA ([facility])    Paper scripts provided to patient : Tylenol #3   If you have any questions regarding to your visit please contact your care team:     Team Silver              Clinic Hours Telephone Number     Dr. Carlos A Houser PA-C   7am-7pm  Monday - Thursday   7am-5pm  Fridays   [phone]   (Appointment scheduling available 24/7)     RN Line  [phone] option 2     Urgent Care - [place] Perez and [place] [address] - 11am-9pm Monday-Friday Saturday-Sunday- 9am-5pm     [place] -   5pm-9pm Monday-Friday Saturday-Sunday- 9am-5pm    [phone] - [address]    [phone] - [place]     For a Price Quote for your services, please call our Consumer Price Line at [phone].     What options do I have for visits at the clinic other than the traditional office visit?     To expand how we care for you, many of our providers are utilizing electronic visits (e-visits) and telephone visits, when medically appropriate, for interactions with their patients rather than a visit in the clinic. We also offer nurse visits for many medical concerns. Just like any other service, we will bill your insurance company for this type of visit based on time spent on the phone with your provider. Not all insurance companies cover these visits. Please check with your medical insurance if this type of visit is covered. You will be responsible for any charges that are not paid by your insurance.   E-visits via [employer]: generally incur a $35.00 fee.     Telephone visits:   Time spent on the phone: *charged based on time that is spent on the phone in increments of 10 minutes. Estimated cost:   5-10 mins $30.00   11-20 mins. $59.00   21-30 mins. $85.00     Use SiteOne Therapeuticshart (secure email communication and access to your chart) to send your primary care provider a message or make an appointment. Ask someone on your Team how to sign up for [employer].     As always, Thank you for trusting us with your health care needs!      [facility]:    Scheduling Appointments  [facility]  Call: [phone]    [facility]  Call: [phone]    [facility]  Call: [phone]    For Gastroenterology referrals   [facility]  Gastroenterology   Clinics and Surgery Center, 4th Floor   [address] [place] [address]   Appointments: [phone]    WHERE TO GO FOR CARE?  Clinic    Make an appointment if you:       Are sick (cold, cough, flu, sore throat, earache or in pain).       Have a small injury (sprain, small cut, burn or broken bone).       Need a physical exam, Pap smear, vaccine or prescription refill.       Have questions about your health or medicines.    To reach us:      Call [phone] ([phone]). Open 24 hours every day. (For counseling services, call [phone].)    Log into Yovia at [employer]. (Visit M[employer] to create an account.) Hospital emergency room    An emergency is a serious or life- threatening problem that must be treated right away.    Call 911 or get to the hospital if you have:      Very bad or sudden:            - Chest pain or pressure         - Bleeding         - Head or belly pain         - Dizziness or trouble seeing, walking or                          Speaking      Problems breathing      Blood in your vomit or you are coughing up blood      A major injury (knocked out, loss of a finger or limb, rape, broken bone protruding from skin)    A mental health crisis. (Or call the Mental Health Crisis line at 1-864.403.5743 or Suicide Prevention Hotline at 1-222.838.2666.)    Open 24 hours every day. You don't need an appointment.     Urgent care    Visit urgent care for sickness or small injuries when the clinic is closed. You don't need an appointment. To check hours or find an urgent care near you, visit [email or web address]. Online care    Get online care from [facility] for more than 70 common problems, like colds, allergies and infections. Open 24 hours every day at:   [email or web address]   Need help deciding?    For advice about where to be seen, you may call your clinic and ask to speak with a nurse. We're here for you 24 hours every day.         If you are deaf or  hard of hearing, please let us know. We provide many free services including sign language interpreters, oral interpreters, TTYs, telephone amplifiers, note takers and written materials.               Follow-ups after your visit        Your next 10 appointments already scheduled     Jul 09, 2018  2:00 PM CDT   MA SCREENING DIGITAL BILATERAL with FKMA1   [facility]    [address] [id number] [phone]           Do not use any powder, lotion or deodorant under your arms or on your breast. If you do, we will ask you to remove it before your exam.  Wear comfortable, two-piece clothing.  If you have any allergies, tell your care team.  Bring any previous mammograms from other facilities or have them mailed to the breast center.            Jul 16, 2018  2:00 PM CDT   New Visit with Sunshine Bell MD   [facility]    [address] [id number] [phone]              Who to contact     If you have questions or need follow up information about today's clinic visit or your schedule please contact [facility] directly at [phone].  Normal or non-critical lab and imaging results will be communicated to you by MyChart, letter or phone within 4 business days after the clinic has received the results. If you do not hear from us within 7 days, please contact the clinic through MyChart or phone. If you have a critical or abnormal lab result, we will notify you by phone as soon as possible.  Submit refill requests through [employer] or call your pharmacy and they will forward the refill request to us. Please allow 3 business days for your refill to be completed.          Additional Information About Your Visit        Care EveryWhere ID     This is your Care EveryWhere ID. This could be used by other organizations to access your [facility] medical records  [id number]        Your  "Vitals Were     Pulse Temperature Height Breastfeeding? BMI (Body Mass Index)       98 98.2  F (36.8  C) (Oral) 5' 8.25\" (1.734 m) No 21.58 kg/m2        Blood Pressure from Last 3 Encounters:   06/25/18 102/66   02/15/18 100/76   09/18/17 114/78    Weight from Last 3 Encounters:   06/25/18 143 lb (64.9 kg)   02/15/18 149 lb (67.6 kg)   09/18/17 148 lb (67.1 kg)              We Performed the Following     ZOSTER VACCINE RECOMBINANT ADJUVANTED IM NJX          Where to get your medicines      These [facility] [address] [facility] [address] [facility]  [address] [facility] [address]     Phone:  [phone]     EPINEPHrine 0.3 MG/0.3ML injection 2-pack    rizatriptan 5 MG ODT tab    simvastatin 40 MG tablet    tiZANidine 2 MG tablet         Some of these will need a paper prescription and others can be bought over the counter.  Ask your nurse if you have questions.     Bring a paper prescription for each of these medications     acetaminophen-codeine 300-30 MG per tablet         Information about OPIOIDS     PRESCRIPTION OPIOIDS: WHAT YOU NEED TO KNOW   We gave you an opioid (narcotic) pain medicine. It is important to manage your pain, but opioids are not always the best choice. You should first try all the other options your care team gave you. Take this medicine for as short a time (and as few doses) as possible.     These medicines have risks:    DO NOT drive when on new or higher doses of pain medicine. These medicines can affect your alertness and reaction times, and you could be arrested for driving under the influence (DUI). If you need to use opioids long-term, talk to your care team about driving.    DO NOT operate heave machinery    DO NOT do any other dangerous activities while taking these medicines.     DO NOT drink any alcohol while taking these medicines.      If the opioid prescribed includes acetaminophen, DO NOT take with " any other medicines that contain acetaminophen. Read all labels carefully. Look for the word  acetaminophen  or  Tylenol.  Ask your pharmacist if you have questions or are unsure.    You can get addicted to pain medicines, especially if you have a history of addiction (chemical, alcohol or substance dependence). Talk to your care team about ways to reduce this risk.    Store your pills in a secure place, locked if possible. We will not replace any lost or stolen medicine. If you don t finish your medicine, please throw away (dispose) as directed by your pharmacist. The [place] Pollution Control Agency has more information about safe disposal: https://www.pca.[facility].[place].us/living-green/managing-unwanted-medications.     All opioids tend to cause constipation. Drink plenty of water and eat foods that have a lot of fiber, such as fruits, vegetables, prune juice, apple juice and high-fiber cereal. Take a laxative (Miralax, milk of magnesia, Colace, Senna) if you don t move your bowels at least every other day.          Primary Care Provider Office Phone # Fax #    Obey Graves [phone]       [address] [address]        Equal Access to Services     SPENCER ARREDONDO : Hadii aelyda torres hadasho Sojailynali, waaxda luqadaha, qaybta kaalmada aderustyyada, ayla umana. So [facility] [phone].    ATENCIÓN: Si habla español, tiene a arauz disposición servicios gratuitos de asistencia lingüística. Juvenal al [phone].    We comply with applicable federal civil rights laws and [place] laws. We do not discriminate on the basis of race, color, national origin, age, disability, sex, sexual orientation, or gender identity.            Thank you!     Thank you for choosing [facility]  for your care. Our goal is always to provide you with excellent care. Hearing back from our patients is one way we can continue to improve our services. Please take a few  minutes to complete the written survey that you may receive in the mail after your visit with us. Thank you!             Your Updated Medication List - Protect others around you: Learn how to safely use, store and throw away your medicines at www.disposemymeds.org.          This list is accurate as of 6/25/18  2:16 PM.  Always use your most recent med list.                   Brand Name Dispense Instructions for use Diagnosis    acetaminophen-codeine 300-30 MG per tablet    TYLENOL #3    90 tablet    Take 1 tablet by mouth 3 times daily    Primary osteoarthritis of left hip       aspirin 81 MG tablet     100    1 tab po QD (Once per day)        CALCIUM 600 + D 600-200 MG-UNIT Tabs      2 tabs daily    Routine general medical examination at a health care facility       * clotrimazole 1 % cream    SM CLOTRIMAZOLE VAGINAL    45 g    Place 1 Applicatorful vaginally daily And apply externally bid    Vaginal itching       * clotrimazole 1 % cream    LOTRIMIN    45 g    PLACE ONE APPLICATORFUL VAGINALLY DAILY AND APPLY EXTERNALLY TWICE DAILY AS DIRECTED    Vaginal itching       Cranberry 500 MG Caps     90    1 CAPSULE 2 TIMES DAILY        DIPROLENE 0.05 % ointment   Generic drug:  augmented betamethasone dipropionate     60gm     apply to the affected area bid    Contact dermatitis and other eczema, due to unspecified cause       EPINEPHrine 0.3 MG/0.3ML injection 2-pack    EPIPEN/ADRENACLICK/or ANY BX GENERIC EQUIV    0.6 mL    Inject 0.3 mLs (0.3 mg) into the muscle once as needed for anaphylaxis    Allergy to bee sting       levothyroxine 75 MCG tablet    SYNTHROID/LEVOTHROID    90 tablet    Take 1 tablet (75 mcg) by mouth daily    Hypothyroidism due to acquired atrophy of thyroid       Miconazole Nitrate 2 % Powd     1 Bottle    1 Application daily as needed    Candidal intertrigo       neomycin-polymyxin-hydrocortisone 3.5-91555-3 otic suspension    CORTISPORIN    10 mL    Place 4 drops into both ears 3 times daily as  needed    Chronic eczematous otitis externa of left ear       omega-3 fatty acids 1200 MG capsule      Take 1 capsule by mouth daily.        polyethylene glycol 0.4%- propylene glycol 0.3% 0.4-0.3 % Soln ophthalmic solution    SYSTANE ULTRA    1 Bottle    Place 1 drop into both eyes as needed for dry eyes    Dry eyes, bilateral       rizatriptan 5 MG ODT tab    MAXALT-MLT    18 tablet    Take 1-2 tablets (5-10 mg) by mouth at onset of headache for migraine May repeat dose in 2 hours.  Do not exceed 30 mg in 24 hours    Migraine with aura and without status migrainosus, not intractable       simvastatin 40 MG tablet    ZOCOR    90 tablet    Take 1 tablet (40 mg) by mouth At Bedtime    Hyperlipidemia LDL goal <130       tiZANidine 2 MG tablet    ZANAFLEX    90 tablet    Take 1 tablet (2 mg) by mouth 3 times daily as needed for muscle spasms    Muscle spasm       * Notice:  This list has 2 medication(s) that are the same as other medications prescribed for you. Read the directions carefully, and ask your doctor or other care provider to review them with you.

## 2018-06-25 NOTE — TELEPHONE ENCOUNTER
tiZANidine (ZANAFLEX) 2 MG tablet      Patient requests 90 days supply    Last Written Prescription Date:  6/25/2018  Last Fill Quantity: 90 tablet,   # refills: 5  Last Office Visit: 6/25/2018  JIMMY Caldwell  Future Office visit:       Routing refill request to provider for review/approval because:  Drug not on the [facility], [facility] or [facility] refill protocol or controlled substance

## 2018-06-25 NOTE — PATIENT INSTRUCTIONS
Orders Placed This Encounter     ZOSTER VACCINE RECOMBINANT ADJUVANTED IM NJX     acetaminophen-codeine (TYLENOL #3) 300-30 MG per tablet     Sig: Take 1 tablet by mouth 3 times daily     Dispense:  90 tablet     Refill:  3     rizatriptan (MAXALT-MLT) 5 MG ODT tab     Sig: Take 1-2 tablets (5-10 mg) by mouth at onset of headache for migraine May repeat dose in 2 hours.  Do not exceed 30 mg in 24 hours     Dispense:  18 tablet     Refill:  5     simvastatin (ZOCOR) 40 MG tablet     Sig: Take 1 tablet (40 mg) by mouth At Bedtime     Dispense:  90 tablet     Refill:  3     tiZANidine (ZANAFLEX) 2 MG tablet     Sig: Take 1 tablet (2 mg) by mouth 3 times daily as needed for muscle spasms     Dispense:  90 tablet     Refill:  5     EPINEPHrine (EPIPEN/ADRENACLICK/OR ANY BX GENERIC EQUIV) 0.3 MG/0.3ML injection 2-pack     Sig: Inject 0.3 mLs (0.3 mg) into the muscle once as needed for anaphylaxis     Dispense:  0.6 mL     Refill:  3     [facility]   Discharged by : Eunice MARTINEZ CMA ([facility])    Paper scripts provided to patient : Tylenol #3   If you have any questions regarding to your visit please contact your care team:     Team Silver              Clinic Hours Telephone Number     Dr. Carlos A Houser PA-C   7am-7pm  Monday - Thursday   7am-5pm  Fridays  [phone]   (Appointment scheduling available 24/7)     RN Line  [phone] option 2     Urgent Care - [place] and [place] - 11am-9pm Monday-Friday Saturday-Sunday- 9am-5pm     [place] -   5pm-9pm Monday-Friday Saturday-Sunday- 9am-5pm    [phone] - [place]    [phone] - [place]     For a Price Quote for your services, please call our Consumer Price Line at [phone].     What options do I have for visits at the clinic other than the traditional office visit?     To expand how we care for you, many of our providers are utilizing electronic visits (e-visits)  and telephone visits, when medically appropriate, for interactions with their patients rather than a visit in the clinic. We also offer nurse visits for many medical concerns. Just like any other service, we will bill your insurance company for this type of visit based on time spent on the phone with your provider. Not all insurance companies cover these visits. Please check with your medical insurance if this type of visit is covered. You will be responsible for any charges that are not paid by your insurance.   E-visits via MyUS.[employer]: generally incur a $35.00 fee.     Telephone visits:   Time spent on the phone: *charged based on time that is spent on the phone in increments of 10 minutes. Estimated cost:   5-10 mins $30.00   11-20 mins. $59.00   21-30 mins. $85.00     Use [employer] (secure email communication and access to your chart) to send your primary care provider a message or make an appointment. Ask someone on your Team how to sign up for [employer].     As always, Thank you for trusting us with your health care needs!      [facility]:    Scheduling Appointments  [place]  Call: [phone]    [facility]  Call: [phone]    [facility]  Call: [phone]    For Gastroenterology referrals   [facility] Gastroenterology   Clinics and Surgery Center, 4th Floor   [address] [place] [address]   Appointments: [phone]    WHERE TO GO FOR CARE?  Clinic    Make an appointment if you:       Are sick (cold, cough, flu, sore throat, earache or in pain).       Have a small injury (sprain, small cut, burn or broken bone).       Need a physical exam, Pap smear, vaccine or prescription refill.       Have questions about your health or medicines.    To reach us:      Call [phone] ([phone]). Open 24 hours every day. (For counseling services, call [phone].)    Log into Sagence at  [email or web address]. (Visit [email or web address] to create an account.) Hospital emergency room    An emergency is a serious or life- threatening problem that must be treated right away.    Call 911 or get to the hospital if you have:      Very bad or sudden:            - Chest pain or pressure         - Bleeding         - Head or belly pain         - Dizziness or trouble seeing, walking or                          Speaking      Problems breathing      Blood in your vomit or you are coughing up blood      A major injury (knocked out, loss of a finger or limb, rape, broken bone protruding from skin)    A mental health crisis. (Or call the Mental Health Crisis line at 1-563.692.6204 or Suicide Prevention Hotline at 1-816.613.7176.)    Open 24 hours every day. You don't need an appointment.     Urgent care    Visit urgent care for sickness or small injuries when the clinic is closed. You don't need an appointment. To check hours or find an urgent care near you, visit [email or web address]. Online care    Get [facility] from [facility] for more than 70 common problems, like colds, allergies and infections. Open 24 hours every day at:   [email or web address]   Need help deciding?    For advice about where to be seen, you may call your clinic and ask to speak with a nurse. We're here for you 24 hours every day.         If you are deaf or hard of hearing, please let us know. We provide many free services including sign language interpreters, oral interpreters, TTYs, telephone amplifiers, note takers and written materials.

## 2018-10-24 ENCOUNTER — OFFICE VISIT (OUTPATIENT)
Dept: FAMILY MEDICINE | Facility: CLINIC | Age: 73
End: 2018-10-24
Payer: COMMERCIAL

## 2018-10-24 VITALS
TEMPERATURE: 99.2 F | SYSTOLIC BLOOD PRESSURE: 108 MMHG | BODY MASS INDEX: 21.58 KG/M2 | HEART RATE: 84 BPM | HEIGHT: 68 IN | DIASTOLIC BLOOD PRESSURE: 76 MMHG

## 2018-10-24 DIAGNOSIS — M17.12 PRIMARY OSTEOARTHRITIS OF LEFT KNEE: ICD-10-CM
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
## 2018-10-24 DIAGNOSIS — M16.12 PRIMARY OSTEOARTHRITIS OF LEFT HIP: Primary | ICD-10-CM

## 2018-10-24 DIAGNOSIS — M25.552 HIP PAIN, LEFT: ICD-10-CM

## 2018-10-24 PROCEDURE — 99214 OFFICE O/P EST MOD 30 MIN: CPT | Performed by: FAMILY MEDICINE

## 2018-10-24 NOTE — MR AVS SNAPSHOT
After Visit Summary   10/24/2018    Argenis Marcos    MRN: [id number]           Patient Information     Date Of Birth          1945        Visit Information        Provider Department      10/24/2018 12:40 PM Obey Graves MD [facility]        Today's Diagnoses     Primary osteoarthritis of left hip    -  1    Fall, initial encounter        Hip pain, left          Care Instructions    Scheduling Appointments    [place], [place], [facility]  Call: [phone]    [facility]  Call: [phone]    [facility]. All locations.  Call: [phone]    [facility]   Discharged by : Romana SWANSON Certified Medical Assistant (AAMA)   Paper scripts provided to patient : 1   If you have any questions regarding to your visit please contact your care team:     Team Silver              Clinic Hours Telephone Number     Dr. Carlos A Houser PA-C   7am-7pm  Monday - Thursday   7am-5pm  Fridays  [phone]   (Appointment scheduling available 24/7)     RN Line  [phone] option 2     [facility] [place] [facility]-Friday Saturday-Sunday- 9am-5pm     [place] -   5pm-9pm Monday-Friday Saturday-Sunday- 9am-5pm    [phone] - [place]anda Perez    [phone] - [place]     For a Price Quote for your services, please call our Consumer Price Line at [phone].     What options do I have for visits at the clinic other than the traditional office visit?     To expand how we care for you, many of our providers are utilizing electronic visits (e-visits) and telephone visits, when medically appropriate, for interactions with their patients rather than a visit in the clinic. We also offer nurse visits for many medical concerns. Just like any other service, we will bill your insurance company for this type of visit based on time spent on the  phone with your provider. Not all insurance companies cover these visits. Please check with your medical insurance if this type of visit is covered. You will be responsible for any charges that are not paid by your insurance.   E-visits via Ninghart: generally incur a $35.00 fee.     Telephone visits:   Time spent on the phone: *charged based on time that is spent on the phone in increments of 10 minutes. Estimated cost:   5-10 mins $30.00   11-20 mins. $59.00   21-30 mins. $85.00     Use 20x200 (secure email communication and access to your chart) to send your primary care provider a message or make an appointment. Ask someone on your Team how to sign up for 20x200.     As always, Thank you for trusting us with your health care needs!      [facility]:    Scheduling Appointments  [place]  Call: [phone]    [facility]  Call: [phone]    [facility]  Call: [phone]    For Gastroenterology referrals   [facility]   Clinics and Surgery Center, 4th Floor   [address] [place] [address]   Appointments: [phone]    WHERE TO GO FOR CARE?  Clinic    Make an appointment if you:       Are sick (cold, cough, flu, sore throat, earache or in pain).       Have a small injury (sprain, small cut, burn or broken bone).       Need a physical exam, Pap smear, vaccine or prescription refill.       Have questions about your health or medicines.    To reach us:      Call [phone] ([phone]). Open 24 hours every day. (For counseling services, call [phone].)    Log into 20x200 at [email or web address]. (Visit [email or web address] to create an account.) Hospital emergency room    An emergency is a serious or life- threatening problem that must be treated right away.    Call 392 or get to the hospital if you have:      Very bad or sudden:            - Chest pain or pressure         -  Bleeding         - Head or belly pain         - Dizziness or trouble seeing, walking or                          Speaking      Problems breathing      Blood in your vomit or you are coughing up blood      A major injury (knocked out, loss of a finger or limb, rape, broken bone protruding from skin)    A mental health crisis. (Or call the Mental Health Crisis line at 1-316.371.2573 or Suicide Prevention Hotline at 1-915.246.6777.)    Open 24 hours every day. You don't need an appointment.     Urgent care    Visit urgent care for sickness or small injuries when the clinic is closed. You don't need an appointment. To check hours or find an urgent care near you, visit [email or web address]. Online care    Get online care from [facility] for more than 70 common problems, like colds, allergies and infections. Open 24 hours every day at:   [email or web address]   Need help deciding?    For advice about where to be seen, you may call your clinic and ask to speak with a nurse. We're here for you 24 hours every day.         If you are deaf or hard of hearing, please let us know. We provide many free services including sign language interpreters, oral interpreters, TTYs, telephone amplifiers, note takers and written materials.               Follow-ups after your visit        Your next 10 appointments already scheduled     Oct 26, 2018  2:00 PM CDT   MA SCREENING DIGITAL BILATERAL with [id number]   [facility] ([facility])    [address] [place] [id number] [phone]           How do I prepare for my exam? (Food and drink instructions) No Food and Drink Restrictions.  How do I prepare for my exam? (Other instructions) Do not use any powder, lotion or deodorant under your arms or on your breast. If you do, we will ask you to remove it before your exam.  What should I wear: Wear comfortable, two-piece clothing.  How long does the exam take: Most scans will take 15 minutes.  What should I bring: Bring  "any previous mammograms from other facilities or have them mailed to the breast center.  Do I need a :  No  is needed.  What do I need to tell my doctor: If you have any allergies, tell your care team.  What should I do after the exam: No restrictions, You may resume normal activities.  What is this test: This test is an x-ray of the breast to look for breast disease. The breast is pressed between two plates to flatten and spread the tissue. An X-ray is taken of the breast from different angles.  Who should I call with questions: If you have any questions, please call the Imaging Department where you will have your exam. Directions, parking instructions, and other information is available on our website, [email or web address].  Other information about my exam Three-dimensional (3D) mammograms are available at [place] locations in [facility], [place], [place], [facility], [place], [facility] and [place]. [facility] locations include [place] and the [facility] in [place].  Benefits of 3D mammograms include * Improved rate of cancer detection * Decreases your chance of having to go back for more tests, which means fewer: * \"False-positive\" results (This means that there is an abnormal area but it isn't cancer.) * Invasive testing procedures, such as a biopsy or surgery * Can provide clearer images of the breast if you have dense breast tissue.  *3D mammography is an optional exam that anyone can have with a 2D mammogram. It doesn't replace or take the place of a 2D mammogram. 2D mammograms remain an effective screening test for all women.  Not all insurance companies cover the cost of a 3D mammogram. Check with your insurance.            Nov 14, 2018  3:00 PM CST   New Visit with Sunshine Bell MD   [facility] [place] ([facility])    [address]  [place] [address] [phone]              Who to contact     If you have questions " "or need follow up information about today's clinic visit or your schedule please contact [facility] directly at [phone].  Normal or non-critical lab and imaging results will be communicated to you by MyChart, letter or phone within 4 business days after the clinic has received the results. If you do not hear from us within 7 days, please contact the clinic through MyChart or phone. If you have a critical or abnormal lab result, we will notify you by phone as soon as possible.  Submit refill requests through [employer] or call your pharmacy and they will forward the refill request to us. Please allow 3 business days for your refill to be completed.          Additional Information About Your Visit        Care EveryWhere ID     This is your Care EveryWhere ID. This could be used by other organizations to access your [place] medical records  [id number]        Your Vitals Were     Pulse Temperature Height BMI (Body Mass Index)          84 99.2  F (37.3  C) (Oral) 5' 8.25\" (1.734 m) 21.58 kg/m2         Blood Pressure from Last 3 Encounters:   10/24/18 108/76   06/25/18 102/66   02/15/18 100/76    Weight from Last 3 Encounters:   06/25/18 143 lb (64.9 kg)   02/15/18 149 lb (67.6 kg)   09/18/17 148 lb (67.1 kg)              Today, you had the following     No orders found for display         Where to get your medicines      Some of these will need a paper prescription and others can be bought over the counter.  Ask your nurse if you have questions.     Bring a paper prescription for each of these medications     acetaminophen-codeine 300-30 MG per tablet         Information about OPIOIDS     PRESCRIPTION OPIOIDS: WHAT YOU NEED TO KNOW   We gave you an opioid (narcotic) pain medicine. It is important to manage your pain, but opioids are not always the best choice. You should first try all the other options your care team gave you. Take this medicine for as short a time (and as few doses) as " possible.    Some activities can increase your pain, such as bandage changes or therapy sessions. It may help to take your pain medicine 30 to 60 minutes before these activities. Reduce your stress by getting enough sleep, working on hobbies you enjoy and practicing relaxation or meditation. Talk to your care team about ways to manage your pain beyond prescription opioids.    These medicines have risks:    DO NOT drive when on new or higher doses of pain medicine. These medicines can affect your alertness and reaction times, and you could be arrested for driving under the influence (DUI). If you need to use opioids long-term, talk to your care team about driving.    DO NOT operate heavy machinery    DO NOT do any other dangerous activities while taking these medicines.    DO NOT drink any alcohol while taking these medicines.     If the opioid prescribed includes acetaminophen, DO NOT take with any other medicines that contain acetaminophen. Read all labels carefully. Look for the word  acetaminophen  or  Tylenol.  Ask your pharmacist if you have questions or are unsure.    You can get addicted to pain medicines, especially if you have a history of addiction (chemical, alcohol or substance dependence). Talk to your care team about ways to reduce this risk.    All opioids tend to cause constipation. Drink plenty of water and eat foods that have a lot of fiber, such as fruits, vegetables, prune juice, apple juice and high-fiber cereal. Take a laxative (Miralax, milk of magnesia, Colace, Senna) if you don t move your bowels at least every other day. Other side effects include upset stomach, sleepiness, dizziness, throwing up, tolerance (needing more of the medicine to have the same effect), physical dependence and slowed breathing.    Store your pills in a secure place, locked if possible. We will not replace any lost or stolen medicine. If you don t finish your medicine, please throw away (dispose) as directed by your  pharmacist. The [place] Pollution Control Agency has more information about safe disposal: [email or web address]/[email or web address]-medications         Primary Care Provider Office Phone # Fax #    Obey Graves [phone] [address] [address]        Equal Access to Services     SPENCER ARREDONDO : Hadii aad ku hadasho Soomaali, waaxda luqadaha, qaybta kaalmada adeegyada, waxay idiin hayaan adeeg khemeliash laKervinromeroaditya . So [facility] [phone].    ATENCIÓN: Si habla español, tiene a arauz disposición servicios gratuitos de asistencia lingüística. Juvenal al [phone].    We comply with applicable federal civil rights laws and [place] laws. We do not discriminate on the basis of race, color, national origin, age, disability, sex, sexual orientation, or gender identity.            Thank you!     Thank you for choosing [facility]  for your care. Our goal is always to provide you with excellent care. Hearing back from our patients is one way we can continue to improve our services. Please take a few minutes to complete the written survey that you may receive in the mail after your visit with us. Thank you!             Your Updated Medication List - Protect others around you: Learn how to safely use, store and throw away your medicines at www.disposemymeds.org.          This list is accurate as of 10/24/18  1:11 PM.  Always use your most recent med list.                   Brand Name Dispense Instructions for use Diagnosis    acetaminophen-codeine 300-30 MG per tablet    TYLENOL #3    90 tablet    Take 1 tablet by mouth 3 times daily    Primary osteoarthritis of left hip       aspirin 81 MG tablet     100    1 tab po QD (Once per day)        CALCIUM 600 + D 600-200 MG-UNIT Tabs      2 tabs daily    Routine general medical examination at a health care facility       * clotrimazole 1 % cream    SM CLOTRIMAZOLE VAGINAL    45 g    Place 1  Applicatorful vaginally daily And apply externally bid    Vaginal itching       * clotrimazole 1 % cream    LOTRIMIN    45 g    PLACE ONE APPLICATORFUL VAGINALLY DAILY AND APPLY EXTERNALLY TWICE DAILY AS DIRECTED    Vaginal itching       Cranberry 500 MG Caps     90    1 CAPSULE 2 TIMES DAILY        DIPROLENE 0.05 % ointment   Generic drug:  augmented betamethasone dipropionate     60gm     apply to the affected area bid    Contact dermatitis and other eczema, due to unspecified cause       EPINEPHrine 0.3 MG/0.3ML injection 2-pack    EPIPEN/ADRENACLICK/or ANY BX GENERIC EQUIV    0.6 mL    Inject 0.3 mLs (0.3 mg) into the muscle once as needed for anaphylaxis    Allergy to bee sting       levothyroxine 75 MCG tablet    SYNTHROID/LEVOTHROID    90 tablet    Take 1 tablet (75 mcg) by mouth daily    Hypothyroidism due to acquired atrophy of thyroid       Miconazole Nitrate 2 % Powd     1 Bottle    1 Application daily as needed    Candidal intertrigo       neomycin-polymyxin-hydrocortisone 3.5-94462-1 otic suspension    CORTISPORIN    10 mL    Place 4 drops into both ears 3 times daily as needed    Chronic eczematous otitis externa of left ear       omega-3 fatty acids 1200 MG capsule      Take 1 capsule by mouth daily.        polyethylene glycol 0.4%- propylene glycol 0.3% 0.4-0.3 % Soln ophthalmic solution    SYSTANE ULTRA    1 Bottle    Place 1 drop into both eyes as needed for dry eyes    Dry eyes, bilateral       rizatriptan 5 MG ODT tab    MAXALT-MLT    18 tablet    Take 1-2 tablets (5-10 mg) by mouth at onset of headache for migraine May repeat dose in 2 hours.  Do not exceed 30 mg in 24 hours    Migraine with aura and without status migrainosus, not intractable       simvastatin 40 MG tablet    ZOCOR    90 tablet    Take 1 tablet (40 mg) by mouth At Bedtime    Hyperlipidemia LDL goal <130       tiZANidine 2 MG tablet    ZANAFLEX    90 tablet    Take 1 tablet (2 mg) by mouth 3 times daily as needed for muscle  spasms    Muscle spasm       * Notice:  This list has 2 medication(s) that are the same as other medications prescribed for you. Read the directions carefully, and ask your doctor or other care provider to review them with you.

## 2018-10-24 NOTE — PROGRESS NOTES
"  SUBJECTIVE:   Argenis Marcos is a 72 year old female who presents to clinic today for the following health issues:      Hyperlipidemia Follow-Up      Rate your low fat/cholesterol diet?: good    Taking statin?  Yes, no muscle aches from statin    Other lipid medications/supplements?:  none    Hypertension Follow-up      Outpatient blood pressures are not being checked.    Low Salt Diet: no added salt      Amount of exercise or physical activity: None    Problems taking medications regularly: No    Medication side effects: none    Diet: low salt and low fat/cholesterol      Concern - Fall  Onset: 6 days ago    Description:   Patient tripped over a loose pair of socks and fell backwards onto her back and left hip.    Intensity: moderate    Progression of Symptoms:  improving    Accompanying Signs & Symptoms:  none    Previous history of similar problem:   none    Precipitating factors:   Worsened by: none    Alleviating factors:  Improved by: none    Therapies Tried and outcome: none    She is able to ambulate but painful. Knees hurt more than hip. Improving.         Problem list and histories reviewed & adjusted, as indicated.  Additional history: as documented    BP Readings from Last 3 Encounters:   10/24/18 108/76   06/25/18 102/66   02/15/18 100/76    Wt Readings from Last 3 Encounters:   06/25/18 143 lb (64.9 kg)   02/15/18 149 lb (67.6 kg)   09/18/17 148 lb (67.1 kg)                    Reviewed and updated as needed this visit by clinical staff  Tobacco  Allergies  Meds  Med Hx  Surg Hx  Fam Hx  Soc Hx      Reviewed and updated as needed this visit by Provider         ROS:  Constitutional, HEENT, cardiovascular, pulmonary, gi and gu systems are negative, except as otherwise noted.    OBJECTIVE:     /76 (Cuff Size: Adult Regular)  Pulse 84  Temp 99.2  F (37.3  C) (Oral)  Ht 5' 8.25\" (1.734 m)  BMI 21.58 kg/m2  Body mass index is 21.58 kg/(m^2).   GENERAL: alert, no distress, frail and " elderly  NECK: no adenopathy, no asymmetry, masses, or scars and thyroid normal to palpation  RESP: lungs clear to auscultation - no rales, rhonchi or wheezes  CV: regular rate and rhythm, normal S1 S2, no S3 or S4, no murmur, click or rub, no peripheral edema and peripheral pulses strong  ABDOMEN: soft, nontender, no hepatosplenomegaly, no masses and bowel sounds normal  MS: normal range of motion of hip without significant pain. Left knee is primary source of pain and tender to palpation in joint space. No effusion   NEURO: Normal strength and tone, mentation intact and speech normal  PSYCH: mentation appears normal, affect normal/bright        ASSESSMENT:       PLAN:   (M16.12) Primary osteoarthritis of left hip  (primary encounter diagnosis)  Comment: recent fall doubt fracture  Plan: acetaminophen-codeine (TYLENOL #3) 300-30 MG         per tablet            (W19.XXXA) Fall, initial encounter  Comment: tripped  Plan: use cane and walder    (M25.552) Hip pain, left  Comment: from fall and arthritis  Plan: conservative treatment      DJD of left knee  Offered injection. She will consider.     Also needs mammogram and she has referral and will shedule    Patient Instructions     Scheduling Appointments    [facility]  Call: [phone]    [facility]  Call: [phone]    [facility]. All locations.  Call: [phone]    [facility]   Discharged by : Romana SWANSON Certified Medical Assistant (AAMA)   Paper scripts provided to patient : 1   If you have any questions regarding to your visit please contact your care team:     Team Silver              Clinic Hours Telephone Number     Dr. Carlos A Houser PA-C   7am-7pm  Monday - Thursday   7am-5pm  Fridays  [phone]   (Appointment scheduling available 24/7)     RN Line  [phone] option 2     Urgent Care - Wanda Perez and Benitez Muñoz  [place] - 11am-9pm Monday-Friday Saturday-Sunday- 9am-5pm     [place] -   5pm-9pm Monday-Friday Saturday-Sunday- 9am-5pm    [phone] - Wanda Perez    [phone] - [place]     For a Price Quote for your services, please call our Consumer Price Line at [phone].     What options do I have for visits at the clinic other than the traditional office visit?     To expand how we care for you, many of our providers are utilizing electronic visits (e-visits) and telephone visits, when medically appropriate, for interactions with their patients rather than a visit in the clinic. We also offer nurse visits for many medical concerns. Just like any other service, we will bill your insurance company for this type of visit based on time spent on the phone with your provider. Not all insurance companies cover these visits. Please check with your medical insurance if this type of visit is covered. You will be responsible for any charges that are not paid by your insurance.   E-visits via KaritKarma: generally incur a $35.00 fee.     Telephone visits:   Time spent on the phone: *charged based on time that is spent on the phone in increments of 10 minutes. Estimated cost:   5-10 mins $30.00   11-20 mins. $59.00   21-30 mins. $85.00     Use RentFeedert (secure email communication and access to your chart) to send your primary care provider a message or make an appointment. Ask someone on your Team how to sign up for KaritKarma.     As always, Thank you for trusting us with your health care needs!      [facility]:    Scheduling Appointments  [facility]  Call: [phone]    [place], [facility]  Call: [phone]    [facility]  Call: [phone]    For Gastroenterology referrals   [facility]   Clinics and Surgery Center, 4th Floor   [address] [place] [address]   Appointments: [phone]    WHERE TO GO FOR  CARE?  Clinic    Make an appointment if you:       Are sick (cold, cough, flu, sore throat, earache or in pain).       Have a small injury (sprain, small cut, burn or broken bone).       Need a physical exam, Pap smear, vaccine or prescription refill.       Have questions about your health or medicines.    To reach us:      Call [phone] ([phone]). Open 24 hours every day. (For counseling services, call [phone].)    Log into [employer] at [employer]. (Visit [email or web address] to create an account.) Hospital emergency room    An emergency is a serious or life- threatening problem that must be treated right away.    Call 140 or get to the hospital if you have:      Very bad or sudden:            - Chest pain or pressure         - Bleeding         - Head or belly pain         - Dizziness or trouble seeing, walking or                          Speaking      Problems breathing      Blood in your vomit or you are coughing up blood      A major injury (knocked out, loss of a finger or limb, rape, broken bone protruding from skin)    A mental health crisis. (Or call the Mental Health Crisis line at 1-580.781.2461 or Suicide Prevention Hotline at 1-817.481.3795.)    Open 24 hours every day. You don't need an appointment.     Urgent care    Visit urgent care for sickness or small injuries when the clinic is closed. You don't need an appointment. To check hours or find an urgent care near you, visit [email or web address]. Online care    Get online care from [facility] for more than 70 common problems, like colds, allergies and infections. Open 24 hours every day at:   [email or web address]   Need help deciding?    For advice about where to be seen, you may call your clinic and ask to speak with a nurse. We're here for you 24 hours every day.         If you are deaf or hard of hearing, please let us know. We provide many free services including sign language interpreters, oral interpreters, TTYs, telephone  amplifiers, note takers and written materials.           Obey Graves MD, MD  [facility]

## 2018-10-24 NOTE — PATIENT INSTRUCTIONS
Scheduling Appointments    [facility]  Call: [phone]    [facility]  Call: [phone]    [facility]. All locations.  Call: [phone]    [facility]   Discharged by : Romana SWANSON Certified Medical Assistant (AAMA)   Paper scripts provided to patient : 1   If you have any questions regarding to your visit please contact your care team:     Team Silver              Clinic Hours Telephone Number     Dr. Carlos A Houser PA-C   7am-7pm  Monday - Thursday   7am-5pm  Fridays  [phone]   (Appointment scheduling available 24/7)     RN Line  [phone] option 2     Urgent Care - Wanda Perez and [place] Wanda Perez - 11am-9pm Monday-Friday Saturday-Sunday- 9am-5pm     [place] -   5pm-9pm Monday-Friday Saturday-Sunday- 9am-5pm    [phone] - Wanda Perez    [phone] - [place]     For a Price Quote for your services, please call our Consumer Price Line at [phone].     What options do I have for visits at the clinic other than the traditional office visit?     To expand how we care for you, many of our providers are utilizing electronic visits (e-visits) and telephone visits, when medically appropriate, for interactions with their patients rather than a visit in the clinic. We also offer nurse visits for many medical concerns. Just like any other service, we will bill your insurance company for this type of visit based on time spent on the phone with your provider. Not all insurance companies cover these visits. Please check with your medical insurance if this type of visit is covered. You will be responsible for any charges that are not paid by your insurance.   E-visits via [employer]: generally incur a $35.00 fee.     Telephone visits:   Time spent on the phone: *charged based on time that is spent on the phone in increments of 10 minutes. Estimated cost:   5-10 mins  $30.00   11-20 mins. $59.00   21-30 mins. $85.00     Use StudySoup (secure email communication and access to your chart) to send your primary care provider a message or make an appointment. Ask someone on your Team how to sign up for StudySoup.     As always, Thank you for trusting us with your health care needs!      [place] [facility]:    Scheduling Appointments  [facility]  Call: [phone]    Marlene Olivares [facility]  Call: [phone]    [facility]  Call: [phone]    For Gastroenterology referrals   [facility] G[facility]enterology   Clinics and Surgery Center, 4th Floor   [address] [place] [address]   Appointments: [phone]    WHERE TO GO FOR CARE?  Clinic    Make an appointment if you:       Are sick (cold, cough, flu, sore throat, earache or in pain).       Have a small injury (sprain, small cut, burn or broken bone).       Need a physical exam, Pap smear, vaccine or prescription refill.       Have questions about your health or medicines.    To reach us:      Call [phone] ([phone]). Open 24 hours every day. (For counseling services, call [phone].)    Log into StudySoup at [email or web address]. (Visit [email or web address] to create an account.) Hospital emergency room    An emergency is a serious or life- threatening problem that must be treated right away.    Call 013 or get to the hospital if you have:      Very bad or sudden:            - Chest pain or pressure         - Bleeding         - Head or belly pain         - Dizziness or trouble seeing, walking or                          Speaking      Problems breathing      Blood in your vomit or you are coughing up blood      A major injury (knocked out, loss of a finger or limb, rape, broken bone protruding from skin)    A mental health crisis. (Or call the Mental Health Crisis line at 1-368.986.8473 or Suicide Prevention Hotline at  5-163-094-0779.)    Open 24 hours every day. You don't need an appointment.     Urgent care    Visit urgent care for sickness or small injuries when the clinic is closed. You don't need an appointment. To check hours or find an urgent care near you, visit [email or web address]. Online care    Get online care from [facility] for more than 70 common problems, like colds, allergies and infections. Open 24 hours every day at:   [email or web address]   Need help deciding?    For advice about where to be seen, you may call your clinic and ask to speak with a nurse. We're here for you 24 hours every day.         If you are deaf or hard of hearing, please let us know. We provide many free services including sign language interpreters, oral interpreters, TTYs, telephone amplifiers, note takers and written materials.

## 2018-10-25 PROBLEM — M17.12 PRIMARY OSTEOARTHRITIS OF LEFT KNEE: Status: ACTIVE | Noted: 2018-10-25

## 2019-02-08 DIAGNOSIS — M16.12 PRIMARY OSTEOARTHRITIS OF LEFT HIP: ICD-10-CM

## 2019-02-08 DIAGNOSIS — G89.4 CHRONIC PAIN SYNDROME: ICD-10-CM

## 2019-02-08 NOTE — TELEPHONE ENCOUNTER
Reason for Call: Request for an order or referral:    Order or referral being requested: acetaminophen-codeine (TYLENOL #3) 300-30 MG per tablet    Date needed: by next week    Has the patient been seen by the PCP for this problem? YES    Additional comments: Please fill at [facility] [address] [place] [address]      Phone number Patient can be reached at:  Cell number on file:    Telephone Information:   Mobile [phone]       Best Time:  Anytime    Can we leave a detailed message on this number?  YES    Call taken on 2/8/2019 at 9:25 AM by Tanisha Painting

## 2019-02-08 NOTE — TELEPHONE ENCOUNTER
acetaminophen-codeine (TYLENOL #3) 300-30 MG per tablet      Last Written Prescription Date:  10/24/2018  Last Fill Quantity: 90 tablet,   # refills: 3  Last Office Visit: 10/24/2018  magda/ JIMMY Graves    Future Office visit:    Next 5 appointments (look out 90 days)    Feb 26, 2019  1:00 PM CST  SHORT with Obey Graves MD  [facility] ([facility] [address] [place] [id number] [phone]           Routing refill request to provider for review/approval because:  Drug not on the [facility], [facility] or [facility] refill protocol or controlled substance

## 2019-02-11 PROBLEM — G89.4 CHRONIC PAIN SYNDROME: Status: ACTIVE | Noted: 2019-02-11

## 2019-02-11 NOTE — TELEPHONE ENCOUNTER
Ok to refill given weather and difficulty for her to travel    Ok to fax.     Refill in completed folder  Orders Placed This Encounter     acetaminophen-codeine (TYLENOL #3) 300-30 MG tablet     Sig: Take 1 tablet by mouth 3 times daily     Dispense:  90 tablet     Refill:  3

## 2019-02-12 NOTE — TELEPHONE ENCOUNTER
Prescription faxed to [facility] at [phone].    Thank you,  Patience SLADE    [place] Team [place]

## 2019-03-18 DIAGNOSIS — E03.4 HYPOTHYROIDISM DUE TO ACQUIRED ATROPHY OF THYROID: ICD-10-CM

## 2019-03-19 ENCOUNTER — TELEPHONE (OUTPATIENT)
Dept: FAMILY MEDICINE | Facility: CLINIC | Age: 74
End: 2019-03-19

## 2019-03-19 DIAGNOSIS — E03.4 HYPOTHYROIDISM DUE TO ACQUIRED ATROPHY OF THYROID: ICD-10-CM

## 2019-03-19 RX ORDER — LEVOTHYROXINE SODIUM 75 UG/1
TABLET ORAL
Qty: 30 TABLET | Refills: 0 | Status: SHIPPED | OUTPATIENT
Start: 2019-03-19 | End: 2019-03-19

## 2019-03-20 RX ORDER — LEVOTHYROXINE SODIUM 75 UG/1
TABLET ORAL
Qty: 90 TABLET | Refills: 3 | Status: SHIPPED | OUTPATIENT
Start: 2019-03-20 | End: 2020-06-18

## 2019-03-20 NOTE — TELEPHONE ENCOUNTER
Synthroid  Routing refill request to provider for review/approval because:  Requesting 90 day supply     Next 5 appointments (look out 90 days)    Apr 01, 2019 12:00 PM CDT  SHORT with Obey Graves MD  [facility] [address] [place] [id number]  [phone]        Cherry Mccurdy RN, BSN

## 2019-03-20 NOTE — TELEPHONE ENCOUNTER
Ok to refill she should make a follow up appt in the next 3  Months    Carlos A Graves MD  Orders Placed This Encounter     levothyroxine (SYNTHROID/LEVOTHROID) 75 MCG tablet     Sig: TAKE 1 TABLET(75 MCG) BY MOUTH DAILY     Dispense:  90 tablet     Refill:  3     **Patient requests 90 days supply**

## 2019-03-25 ENCOUNTER — ANCILLARY PROCEDURE (OUTPATIENT)
Dept: MAMMOGRAPHY | Facility: CLINIC | Age: 74
End: 2019-03-25
Attending: FAMILY MEDICINE
Payer: COMMERCIAL

## 2019-03-25 DIAGNOSIS — Z12.31 VISIT FOR SCREENING MAMMOGRAM: ICD-10-CM

## 2019-03-25 PROCEDURE — 77067 SCR MAMMO BI INCL CAD: CPT | Mod: TC

## 2019-03-25 NOTE — TELEPHONE ENCOUNTER
LMOM for patient to call back main clinic number to schedule an appointment with Obey Graves MD.    Thank you,  Patience SLADE    [place] Team [place]

## 2019-04-01 ENCOUNTER — OFFICE VISIT (OUTPATIENT)
Dept: FAMILY MEDICINE | Facility: CLINIC | Age: 74
End: 2019-04-01
Payer: COMMERCIAL

## 2019-04-01 VITALS — HEART RATE: 80 BPM | SYSTOLIC BLOOD PRESSURE: 114 MMHG | TEMPERATURE: 98 F | DIASTOLIC BLOOD PRESSURE: 76 MMHG

## 2019-04-01 DIAGNOSIS — M17.12 PRIMARY OSTEOARTHRITIS OF LEFT KNEE: Primary | ICD-10-CM

## 2019-04-01 PROCEDURE — 20610 DRAIN/INJ JOINT/BURSA W/O US: CPT | Performed by: FAMILY MEDICINE

## 2019-04-01 RX ORDER — TRIAMCINOLONE ACETONIDE 40 MG/ML
40 INJECTION, SUSPENSION INTRA-ARTICULAR; INTRAMUSCULAR ONCE
Qty: 1 ML | Refills: 0 | OUTPATIENT
Start: 2019-04-01 | End: 2019-04-08

## 2019-04-01 NOTE — PROGRESS NOTES
SUBJECTIVE:   Argenis Marcos is a 73 year old female who presents to clinic today for the following health issues:      Patient seen 10/24 after a fall and complaints of pain.   Was told to follow up for knee injection if she wanted.   She would like injection completed today.     Patient is requesting second dose of Shingrix vaccine today.     Discussed options for treatment of knee OA including oral medication, joint injection, synvisc and surgery. Pt is failing current oral meds and would like injection today. We discussed the risks of injection, including infection of the joint, pain flare up, and not being effective.   Injection site was anesthetized with 0.5 cc of plain 1% lidocaine. In sterile fashion, the left anterior knee cleaned with betadine x3. Then 40mg kenalog and 4cc of plain lidocaine  was injected into left knee using the  Medial  approach.  Pt tolerated procedure well. Followup if not improving over the next couple of weeks.    Kenalog  Lot # [id number]  EXP Date 09/2020      Reviewed and updated as needed this visit by clinical staff       Reviewed and updated as needed this visit by Provider         ROS:  Constitutional, HEENT, cardiovascular, pulmonary, gi and gu systems are negative, except as otherwise noted.    OBJECTIVE:     /76   Pulse 80   Temp 98  F (36.7  C)       ASSESSMENT:       PLAN:   (M17.12) Primary osteoarthritis of left knee  (primary encounter diagnosis)  Comment:   Plan: triamcinolone (KENALOG) 40 MG/ML injection,         DRAIN/INJECT LARGE JOINT/BURSA            Shingrix not given today. Will give at next OV. Did not want to confuse side effects of Shingrix with any side effects or concerns with knee injectijon          Patient Instructions   Monitor for any fevers of swelling    Phone follow up 1 week.       Obey Graves MD, MD  [facility]

## 2019-04-08 ENCOUNTER — VIRTUAL VISIT (OUTPATIENT)
Dept: FAMILY MEDICINE | Facility: CLINIC | Age: 74
End: 2019-04-08
Payer: COMMERCIAL

## 2019-04-08 DIAGNOSIS — M17.12 PRIMARY OSTEOARTHRITIS OF LEFT KNEE: Primary | ICD-10-CM

## 2019-04-08 DIAGNOSIS — Z91.030 ALLERGY TO BEE STING: ICD-10-CM

## 2019-04-08 PROCEDURE — 99207 ZZC NO BILLABLE SERVICE THIS VISIT: CPT | Performed by: FAMILY MEDICINE

## 2019-04-08 RX ORDER — EPINEPHRINE 0.3 MG/.3ML
0.3 INJECTION SUBCUTANEOUS
Qty: 0.6 ML | Refills: 3 | Status: SHIPPED | OUTPATIENT
Start: 2019-04-08 | End: 2021-05-19

## 2019-04-08 NOTE — Clinical Note
Did we get a consent for the virtual visit for Argenis Marcos. It is not noted in the templates note.

## 2019-04-08 NOTE — Clinical Note
Do you recall if your reviewed the cost of a phone visit with this patient. I cannot closed until the question is answeredCarlos A Graves MD

## 2019-04-08 NOTE — PROGRESS NOTES
"Argenis Marcos is a 73 year old female who is being evaluated via a telephone visit.      The patient has been notified of following:     \"This telephone visit will be conducted via a call between you and your physician/provider. We have found that certain health care needs can be provided without the need for a physical exam.  This service lets us provide the care you need with a short phone conversation.  If a prescription is necessary we can send it directly to your pharmacy.  If lab work is needed we can place an order for that and you can then stop by our lab to have the test done at a later time.    We will bill your insurance company for this service.  Please check with your medical insurance if this type of visit is covered. You may be responsible for the cost of this type of visit if insurance coverage is denied.  The typical cost is $30 (10min), $59(11-20min) and $85 (21-30min).  Most often these visits are shorter than 10 minutes.    If during the course of the call the physician/provider feels a telephone visit is not appropriate, you will not be charged for this service. \"     Consent has been obtained for this service by 1 care team member:yes. See the scanned image in the medical record.    Preferred patient phone number to be used for this call: [phone]     Argenis Marcos complains of knee injection completed 4/1. Discuss effectiveness today.     Past Medical History:   Diagnosis Date     Cataract 12/1/2011     Depressive disorder, not elsewhere classified      Esophageal reflux      Lymphoproliferat disease      Osteoarthrosis, unspecified whether generalized or localized, unspecified site      Pure hypercholesterolemia      Thyroid disease       Social History     Socioeconomic History     Marital status: Single     Spouse name: Not on file     Number of children: Not on file     Years of education: Not on file     Highest education level: Not on file   Occupational History     Not on file " "  Social Needs     Financial resource strain: Not on file     Food insecurity:     Worry: Not on file     Inability: Not on file     Transportation needs:     Medical: Not on file     Non-medical: Not on file   Tobacco Use     Smoking status: Former Smoker     Smokeless tobacco: Never Used     Tobacco comment: quit in the 70's   Substance and Sexual Activity     Alcohol use: No     Drug use: No     Sexual activity: Never   Lifestyle     Physical activity:     Days per week: Not on file     Minutes per session: Not on file     Stress: Not on file   Relationships     Social connections:     Talks on phone: Not on file     Gets together: Not on file     Attends Sabianism service: Not on file     Active member of club or organization: Not on file     Attends meetings of clubs or organizations: Not on file     Relationship status: Not on file     Intimate partner violence:     Fear of current or ex partner: Not on file     Emotionally abused: Not on file     Physically abused: Not on file     Forced sexual activity: Not on file   Other Topics Concern      Service No     Blood Transfusions No     Caffeine Concern No     Occupational Exposure Yes     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 No   Social History Narrative    Caffeine intake/servings daily - 1    Calcium intake/servings daily - 1-3    Exercise 0 times weekly - describe none    Sunscreen used - Yes    Seatbelts used - Yes    Guns stored in the home - No    " Self Breast Exam - Yes    Pap test up to date -  Yes and as of today    Eye exam up to date -  Yes    Dental exam up to date -  No    DEXA scan up to date -  No    Flex Sig/Colonoscopy up to date -  No    Mammography up to date -  No    Immunizations reviewed and up to date - Yes and Tdap 1/13/11    Abuse: Current or Past (Physical, Sexual or Emotional) - No    Do you feel safe in your environment - Yes    Do you cope well with stress - Most times    Do you suffer from insomnia - Yes - not taking meds for this    Last updated by: Adelina Morales  2/7/2012                         ALLERGIES  Imitrex [sumatriptan succinate]; Zomig [zolmitriptan]; Sulfa drugs; and Sumatriptan        Cande Chance MA   (MA signature)    Additional provider notes:patient states great response to injection. No swelling fevers. Walking  Better. Reviewed we can repeat 3-4 times per year.    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  This accurately captures the substance of my conversation with the patient,  Argenis Marcos

## 2019-04-15 NOTE — TELEPHONE ENCOUNTER
Patient had OV on 04/01 and phone visit on 04/08.    Thank you,  Patience SLADE    [facility] Team [place]

## 2019-04-24 ENCOUNTER — TELEPHONE (OUTPATIENT)
Dept: FAMILY MEDICINE | Facility: CLINIC | Age: 74
End: 2019-04-24

## 2019-04-24 NOTE — TELEPHONE ENCOUNTER
Patient is aware we may not get back to her until tomorrow. We can review the denial form tomorrow if you are at [place].      Spoke with pharmacist form [facility].  RX for epi-pen is covered; however, patient has a co-pay of $268.92.  Patient states this is too expensive for her.      Would you recommend sending the RX to our pharmacy and see if our [facility] Assistance Program would help?    Thank you,    Kian Hsieh RN

## 2019-04-24 NOTE — TELEPHONE ENCOUNTER
I'm at [facility] today so not able to come view the denial. Is there anything else listed as alternatives the insurance will cover? The prescription was not specifically written for Epi Pen, so there should be an alternative the pharmacy can run through.    Cande Jeffery, Pharm.D., [facility]  Medication Therapy Management Pharmacist  [phone]

## 2019-04-24 NOTE — TELEPHONE ENCOUNTER
Routing message to Cande Jeffery, Pharmacist.  Are you able to investigate further and find another option?    Received a notice of denial from [employer] for epi-pen RX.  Patient has anaphylactic reaction to bee stings.    Patient states [employer] covered the cost of the epi-pen in the past after she paid a co-pay.  Now she has a new plan, and understands the request has been denied.     The form is on the franny team on my desk in orange folder if needed.     Patient requests we call back after 1:00 pm.      Thank you,    Kian Hsieh RN

## 2019-04-25 NOTE — TELEPHONE ENCOUNTER
Reviewed Medicare Part D denial form. As below, it appears the medication is covered, but at a high copay. She does not qualify for use of coupons or patient assistance, because she is covered under Medicare. Her best option would be to pay cash for a prescription at [facility] pharmacy, which can provide an Adrenaclick 2 pack for $109.99, less than half of her current copay.    Cande Jeffery, Pharm.D., [facility]  Medication Therapy Management Pharmacist  [phone]

## 2019-04-25 NOTE — TELEPHONE ENCOUNTER
Patient/family was instructed to return call to [facility] RN directly on the RN Call back line at [phone].     Huddled with Cande Jeffery, Pharmacist.  Best option is for patient to have [facility] transfer the RX for the epi-pen from [facility] (her current pharmacy). [facility] sells a pack of 2 epi-pens for $109.99 cash.  This will not go towards her co-pay.          Kian Hsieh RN

## 2019-04-26 NOTE — TELEPHONE ENCOUNTER
Patient returns call to RN voicemail. She can be reached at [phone].    Carole Catherine RN

## 2019-05-08 ENCOUNTER — OFFICE VISIT (OUTPATIENT)
Dept: OPHTHALMOLOGY | Facility: CLINIC | Age: 74
End: 2019-05-08
Payer: COMMERCIAL

## 2019-05-08 DIAGNOSIS — H52.4 PRESBYOPIA: ICD-10-CM

## 2019-05-08 DIAGNOSIS — H43.813 POSTERIOR VITREOUS DETACHMENT OF BOTH EYES: ICD-10-CM

## 2019-05-08 DIAGNOSIS — H25.13 NUCLEAR SCLEROTIC CATARACT OF BOTH EYES: Primary | ICD-10-CM

## 2019-05-08 PROCEDURE — 92015 DETERMINE REFRACTIVE STATE: CPT | Performed by: STUDENT IN AN ORGANIZED HEALTH CARE EDUCATION/TRAINING PROGRAM

## 2019-05-08 PROCEDURE — 92004 COMPRE OPH EXAM NEW PT 1/>: CPT | Performed by: STUDENT IN AN ORGANIZED HEALTH CARE EDUCATION/TRAINING PROGRAM

## 2019-05-08 ASSESSMENT — REFRACTION_WEARINGRX
OS_ADD: +2.75
OD_ADD: +2.75
OS_CYLINDER: +0.75
OD_CYLINDER: +0.75
OD_AXIS: 080
SPECS_TYPE: BIFOCAL
OS_SPHERE: +1.50
OS_AXIS: 105
OD_SPHERE: +1.25

## 2019-05-08 ASSESSMENT — VISUAL ACUITY
OS_CC+: -1
OD_CC+: -1
OD_CC: 20/40
METHOD: SNELLEN - LINEAR
OS_CC: 20/30
OD_CC: J5
CORRECTION_TYPE: GLASSES
OS_CC: J3

## 2019-05-08 ASSESSMENT — CUP TO DISC RATIO
OS_RATIO: 0.55
OD_RATIO: 0.6

## 2019-05-08 ASSESSMENT — EXTERNAL EXAM - RIGHT EYE: OD_EXAM: NORMAL

## 2019-05-08 ASSESSMENT — TONOMETRY
IOP_METHOD: APPLANATION
OS_IOP_MMHG: 10
OD_IOP_MMHG: 10

## 2019-05-08 ASSESSMENT — REFRACTION_MANIFEST
OD_AXIS: 040
OS_SPHERE: +2.25
OS_AXIS: 105
OD_CYLINDER: +1.00
OS_CYLINDER: +0.75
OD_ADD: +3.00
OS_ADD: +3.00
OD_SPHERE: +2.25

## 2019-05-08 ASSESSMENT — SLIT LAMP EXAM - LIDS
COMMENTS: NORMAL
COMMENTS: NORMAL

## 2019-05-08 ASSESSMENT — EXTERNAL EXAM - LEFT EYE: OS_EXAM: NORMAL

## 2019-05-08 ASSESSMENT — CONF VISUAL FIELD
OD_NORMAL: 1
OS_NORMAL: 1

## 2019-05-08 NOTE — PROGRESS NOTES
Current Eye Medications:  Systane prn     Subjective:  Complete eye exam     Patient feels that her vision is a bit blurry and they itch also. She knows she does not use her artificial tears enough.     Objective:  See Ophthalmology Exam.      Assessment:  Argenis Marcos is a 73 year old female who presents with:   Encounter Diagnoses   Name Primary?     Nuclear sclerotic cataract of both eyes Not visually significant      Posterior vitreous detachment of both eyes        Plan:  Continue artificial tears up to four times a day as needed     May try Zaditor twice a day as needed for eye itchiness (over-the-counter eyedrop).     Glasses prescription given     Sunshine Bell MD  [phone]

## 2019-05-08 NOTE — PATIENT INSTRUCTIONS
Continue artificial tears up to four times a day as needed     May try Zaditor twice a day as needed for eye itchiness (over-the-counter eyedrop).     Glasses prescription given     Sunshine Bell MD  [phone]

## 2019-05-08 NOTE — LETTER
5/8/2019       RE: Argenis Marcos  [address] [address]      Dear Dr. Graves,    Thank you for referring your patient, Argenis Marcos, to the [facility].     Her eye exam is stable.  Please see a copy of my visit note below.     Current Eye Medications:  Systane prn     Subjective:  Complete eye exam     Patient feels that her vision is a bit blurry and they itch also. She knows she does not use her artificial tears enough.     Objective:  See Ophthalmology Exam.      Assessment:  Argenis Marcos is a 73 year old female who presents with:   Encounter Diagnoses   Name Primary?     Nuclear sclerotic cataract of both eyes Not visually significant      Posterior vitreous detachment of both eyes        Plan:  Continue artificial tears up to four times a day as needed     May try Zaditor twice a day as needed for eye itchiness (over-the-counter eyedrop).     Glasses prescription given     Sunshine Bell MD  [phone]        Again, thank you for allowing me to participate in the care of your patient.        Sincerely,        Sunshine Bell MD

## 2019-06-06 DIAGNOSIS — M16.12 PRIMARY OSTEOARTHRITIS OF LEFT HIP: ICD-10-CM

## 2019-06-06 NOTE — TELEPHONE ENCOUNTER
acetaminophen-codeine (TYLENOL #3) 300-30 MG tablet      Last Written Prescription Date:  2/11/2019  Last Fill Quantity: 90 tablet,   # refills: 3  Last Office Visit: 4/1/2019  magda/ JIMMY Graves      Future Office visit:    Next 5 appointments (look out 90 days)    Jul 22, 2019  1:00 PM CDT  SHORT with Obey Graves MD  [facility] [address] [place] [id number] [phone]           Routing refill request to provider for review/approval because:  Drug not on the [facility], [facility] or  Health refill protocol or controlled substance

## 2019-06-10 DIAGNOSIS — N89.8 VAGINAL ITCHING: ICD-10-CM

## 2019-06-10 DIAGNOSIS — M16.12 PRIMARY OSTEOARTHRITIS OF LEFT HIP: ICD-10-CM

## 2019-06-10 NOTE — TELEPHONE ENCOUNTER
Last Written Prescription Date:  05/31/18  Last Fill Quantity: 45,  # refills: 0   Last office visit: 4/8/2019 with prescribing provider:     Future Office Visit:   Next 5 appointments (look out 90 days)    Jul 22, 2019  1:00 PM CDT  SHORT with Obey Graves MD  [facility] ([facility]) [address] [place] [id number] [phone]

## 2019-06-10 NOTE — TELEPHONE ENCOUNTER
Patient is scheduled for 07/22. Patient informed script sent.    Thank you,  Patience SLADE    NE Team Ana Maria

## 2019-06-10 NOTE — TELEPHONE ENCOUNTER
Refill in completed folder    I would like her to follow up in July before my Sabbatical to review her pain medications while I am gone.     Carlos A Graves MD

## 2019-06-13 RX ORDER — CLOTRIMAZOLE 1 %
CREAM WITH APPLICATOR VAGINAL
Qty: 45 G | Refills: 3 | Status: SHIPPED | OUTPATIENT
Start: 2019-06-13 | End: 2020-05-21

## 2019-06-13 NOTE — TELEPHONE ENCOUNTER
See previous encounter. Tylenol #3 was sent per note. I will fill the clotrimazole. Review with patient

## 2019-06-13 NOTE — TELEPHONE ENCOUNTER
"Routing refill request to PCP (pended) as unable to fill due to meds not on RN protocol.  Patient is hoping to  RXs at 1:00 pm today.    Last unrelated visit was 4/8.  Please let me know if you are okay to refill or prefer RN to triage current symptoms.       Kian Hsieh RN        Per RN note 5/31/2018:    Phone call to patient.  Reports vaginal rash that reoccurs every summer in warmer weather.  Denies pain, itchiness, odor, discharge, fever or other concerns.  Does note redness in her external vaginal area.     Contrary to note below, patient is not having foot problem.  Powder is also for vaginal rash to \"keep area dry\" and prevent rash.     Prescriptions sent as requested.     Reviewed warning signs and when to seek urgent medical attention.  Patient verbalized understanding.     Aramis Negron RN          Requested Prescriptions   Pending Prescriptions Disp Refills     acetaminophen-codeine (TYLENOL #3) 300-30 MG tablet [Pharmacy Med Name: ACETAMINOPHEN/COD #3 (300/30MG) TAB] 90 tablet 0     Sig: TAKE 1 TABLET BY MOUTH THREE TIMES DAILY       There is no refill protocol information for this order        clotrimazole (LOTRIMIN) 1 % vaginal cream [Pharmacy Med Name: CLOTRIMAZOLE 1% VAGINAL CREAM 45GM] 45 g 0     Sig: PLACE 1 APPLICATORFUL IN THE VAGINA DAILY AND APPLY EXTERNALLY TWICE DAILY AS DIRECTED       There is no refill protocol information for this order        "

## 2019-06-13 NOTE — TELEPHONE ENCOUNTER
Patient calling to check on the status of the clotrimazole cream request. She states she is going to the pharmacy at 1pm today to  another med, and is hoping to  the clotrimazole as well.    Tylenol request in this encounter appears to be a duplicate.    Thanks!  Junior Flanagan

## 2019-07-02 DIAGNOSIS — M16.12 PRIMARY OSTEOARTHRITIS OF LEFT HIP: ICD-10-CM

## 2019-07-08 NOTE — TELEPHONE ENCOUNTER
Patient notified and routing back to PCP for Rx.  Patient reports she still takes it for osteoarthritis pain.     Carole Catherine RN

## 2019-07-08 NOTE — TELEPHONE ENCOUNTER
Script faxed to [facility].    Thank you,  Patience SLADE    NE Team Ana Maria

## 2019-07-22 ENCOUNTER — OFFICE VISIT (OUTPATIENT)
Dept: FAMILY MEDICINE | Facility: CLINIC | Age: 74
End: 2019-07-22
Payer: COMMERCIAL

## 2019-07-22 VITALS
SYSTOLIC BLOOD PRESSURE: 102 MMHG | DIASTOLIC BLOOD PRESSURE: 60 MMHG | BODY MASS INDEX: 21.74 KG/M2 | TEMPERATURE: 98.6 F | WEIGHT: 144 LBS

## 2019-07-22 DIAGNOSIS — B37.2 CANDIDAL INTERTRIGO: ICD-10-CM

## 2019-07-22 DIAGNOSIS — M62.838 MUSCLE SPASM: ICD-10-CM

## 2019-07-22 DIAGNOSIS — Z23 NEED FOR SHINGLES VACCINE: ICD-10-CM

## 2019-07-22 DIAGNOSIS — M17.12 PRIMARY OSTEOARTHRITIS OF LEFT KNEE: Primary | ICD-10-CM

## 2019-07-22 DIAGNOSIS — E78.5 HYPERLIPIDEMIA LDL GOAL <130: ICD-10-CM

## 2019-07-22 DIAGNOSIS — E03.4 HYPOTHYROIDISM DUE TO ACQUIRED ATROPHY OF THYROID: ICD-10-CM

## 2019-07-22 DIAGNOSIS — M16.12 PRIMARY OSTEOARTHRITIS OF LEFT HIP: ICD-10-CM

## 2019-07-22 DIAGNOSIS — Z13.220 SCREENING FOR HYPERLIPIDEMIA: ICD-10-CM

## 2019-07-22 DIAGNOSIS — G43.109 MIGRAINE WITH AURA AND WITHOUT STATUS MIGRAINOSUS, NOT INTRACTABLE: ICD-10-CM

## 2019-07-22 DIAGNOSIS — G89.4 CHRONIC PAIN SYNDROME: ICD-10-CM

## 2019-07-22 PROCEDURE — 80061 LIPID PANEL: CPT | Performed by: FAMILY MEDICINE

## 2019-07-22 PROCEDURE — 36415 COLL VENOUS BLD VENIPUNCTURE: CPT | Performed by: FAMILY MEDICINE

## 2019-07-22 PROCEDURE — 99212 OFFICE O/P EST SF 10 MIN: CPT | Mod: 25 | Performed by: FAMILY MEDICINE

## 2019-07-22 PROCEDURE — 84443 ASSAY THYROID STIM HORMONE: CPT | Performed by: FAMILY MEDICINE

## 2019-07-22 PROCEDURE — 90471 IMMUNIZATION ADMIN: CPT | Performed by: FAMILY MEDICINE

## 2019-07-22 PROCEDURE — 20610 DRAIN/INJ JOINT/BURSA W/O US: CPT | Performed by: FAMILY MEDICINE

## 2019-07-22 PROCEDURE — 90750 HZV VACC RECOMBINANT IM: CPT | Performed by: FAMILY MEDICINE

## 2019-07-22 PROCEDURE — 99207 C PAF COMPLETED  NO CHARGE: CPT | Performed by: FAMILY MEDICINE

## 2019-07-22 RX ORDER — SIMVASTATIN 40 MG
40 TABLET ORAL AT BEDTIME
Qty: 90 TABLET | Refills: 3 | Status: SHIPPED | OUTPATIENT
Start: 2019-07-22 | End: 2020-07-26

## 2019-07-22 RX ORDER — MICONAZOLE NITRATE
1 POWDER (GRAM) MISCELLANEOUS DAILY PRN
Qty: 1 BOTTLE | Refills: 3 | Status: SHIPPED | OUTPATIENT
Start: 2019-07-22 | End: 2020-05-21

## 2019-07-22 RX ORDER — RIZATRIPTAN BENZOATE 5 MG/1
5-10 TABLET, ORALLY DISINTEGRATING ORAL
Qty: 18 TABLET | Refills: 5 | Status: SHIPPED | OUTPATIENT
Start: 2019-07-22 | End: 2020-06-18

## 2019-07-22 RX ORDER — TRIAMCINOLONE ACETONIDE 40 MG/ML
40 INJECTION, SUSPENSION INTRA-ARTICULAR; INTRAMUSCULAR ONCE
Qty: 1 ML | Refills: 0 | OUTPATIENT
Start: 2019-07-22 | End: 2019-07-22

## 2019-07-22 RX ORDER — TIZANIDINE 2 MG/1
2 TABLET ORAL 3 TIMES DAILY PRN
Qty: 90 TABLET | Refills: 5 | Status: SHIPPED | OUTPATIENT
Start: 2019-07-22 | End: 2019-07-22

## 2019-07-22 ASSESSMENT — PAIN SCALES - GENERAL: PAINLEVEL: MILD PAIN (2)

## 2019-07-22 NOTE — PATIENT INSTRUCTIONS
Orders Placed This Encounter         Lipid panel reflex to direct LDL Fasting     Last Lab Result: LDL Cholesterol Calculated (mg/dL)       Date                     Value                 02/15/2018               40               ----------     Order Specific Question:   Perform labs while fasting or non-fasting?     Answer:   Fasting     TSH WITH FREE T4 REFLEX     Last Lab Result: TSH (mU/L)       Date                     Value                 02/15/2018               0.76             ----------     tiZANidine (ZANAFLEX) 2 MG tablet     Sig: Take 1 tablet (2 mg) by mouth 3 times daily as needed for muscle spasms     Dispense:  90 tablet     Refill:  5     simvastatin (ZOCOR) 40 MG tablet     Sig: Take 1 tablet (40 mg) by mouth At Bedtime     Dispense:  90 tablet     Refill:  3     rizatriptan (MAXALT-MLT) 5 MG ODT     Sig: Take 1-2 tablets (5-10 mg) by mouth at onset of headache for migraine May repeat dose in 2 hours.  Do not exceed 30 mg in 24 hours     Dispense:  18 tablet     Refill:  5     acetaminophen-codeine (TYLENOL #3) 300-30 MG tablet     Sig: TAKE 1 TABLET BY MOUTH THREE TIMES DAILY     Dispense:  90 tablet     Refill:  1     ketotifen (ZADITOR) 0.025 % ophthalmic solution     Sig: Place 1 drop into both eyes 2 times daily     Miconazole Nitrate 2 % POWD     Si Application daily as needed     Dispense:  1 Bottle     Refill:  3     You will  Need to call our clinic when you need your next tylenol with codeine refill.   [phone]

## 2019-07-22 NOTE — PROGRESS NOTES
Subjective     rAgenis Macros is a 73 year old female who presents to clinic today for the following health issues:    HPI   Knee injection, left knee pain     Needs a new ear drop, ins. Is not covering current med.     Discuss multiple vitamin     Needs refill of medications and shingrix.     We also reviewed the new rules for prescribing narcotics. She is on tylenol #3 and had tolerated and used appropriately. Rx will be give today but she will have to call monthly for a new RX until new protocol is established by the clinis    BP Readings from Last 3 Encounters:   07/22/19 102/60   04/01/19 114/76   10/24/18 108/76    Wt Readings from Last 3 Encounters:   07/22/19 65.3 kg (144 lb)   06/25/18 64.9 kg (143 lb)   02/15/18 67.6 kg (149 lb)                        (M17.12) Primary osteoarthritis of left knee  (primary encounter diagnosis)  Comment:   Plan: DRAIN/INJECT LARGE JOINT/BURSA, triamcinolone         (KENALOG) 40 MG/ML injection        tolerated well .     (M62.838) Muscle spasm  Comment: ok to refill  Plan: tiZANidine (ZANAFLEX) 2 MG tablet            (E78.5) Hyperlipidemia LDL goal <130  Comment:   Plan: simvastatin (ZOCOR) 40 MG tablet            (G43.109) Migraine with aura and without status migrainosus, not intractable  Comment:   Plan: rizatriptan (MAXALT-MLT) 5 MG ODT            (M16.12) Primary osteoarthritis of left hip  Comment: Wereviewed the new rules for prescribing narcotics. She is on tylenol #3 and had tolerated and used appropriately. Rx will be give today but she will have to call monthly for a new RX until new protocol is established by the clinis  Plan: acetaminophen-codeine (TYLENOL #3) 300-30 MG         tablet            (Z13.220) Screening for hyperlipidemia  Comment:   Plan: Lipid panel reflex to direct LDL Fasting            (B37.2) Candidal intertrigo  Comment:   Plan: Miconazole Nitrate 2 % POWD            (E03.4) Hypothyroidism due to acquired atrophy of thyroid  Comment:   Plan:  TSH WITH FREE T4 REFLEX            (Z23) Need for shingles vaccine  Comment:   Plan: ZOSTER VACCINE RECOMBINANT ADJUVANTED IM NJX                Reviewed and updated as needed this visit by Provider         Discussed options for treatment of knee OA including oral medication, joint injection, synvisc and surgery. Pt is failing current oral meds and would like injection today. We discussed the risks of injection, including infection of the joint, pain flare up, and not being effective.   Injection site was anesthetized with 0.5 cc of plain 1% lidocaine. In sterile fashion, the leftt anterior knee cleaned with betadine x3. Then 40mg kenalog and 4cc of plain lidocaine  was injected into left knee using the  medial  approach.  Pt tolerated procedure well. Followup if not improving over the next couple of weeks.    Kenalog  Lot # [id number]   EXP Date 01/2021

## 2019-07-22 NOTE — NURSING NOTE
1.  Has the patient received the information for the Zostavax vaccine? YES    2.  Does the patient have any of the following contraindications?     Allergy to Neomycin or Gelatin? No       Current moderate or severe illness? No  Temp = 98.6  If temp > 99.0 degrees orally or patient has above allergies, vaccine is deferred    3.  The vaccine has been administered in the usual fashion and the patient was instructed to wait 15 minutes before leaving the building in the event of an allergic reaction: YES    Vaccination given by Eunice De Leon CMA ([facility]).  Recorded by Eunice De Leon

## 2019-07-22 NOTE — LETTER
July 26, 2019      Argenis Marcos  [address] [address]          Dear Ms. Marcos     The results of your recent lab tests were within normal limits. Enclosed is a copy of these results.       Results for orders placed or performed in visit on 07/22/19   Lipid panel reflex to direct LDL Fasting   Result Value Ref Range    Cholesterol 148 <200 mg/dL    Triglycerides 178 (H) <150 mg/dL    HDL Cholesterol 44 (L) >49 mg/dL    LDL Cholesterol Calculated 68 <100 mg/dL    Non HDL Cholesterol 104 <130 mg/dL   TSH WITH FREE T4 REFLEX   Result Value Ref Range    TSH 0.87 0.40 - 4.00 mU/L     Please call with any questions.     Sincerely,       Obey Graves MD /kb

## 2019-07-22 NOTE — TELEPHONE ENCOUNTER
tiZANidine (ZANAFLEX) 2 MG tablet 90 tablet 5 7/22/2019  No   Sig - Route: Take 1 tablet (2 mg) by mouth 3 times daily as needed for muscle spasms - Oral   Sent to pharmacy as: tiZANidine (ZANAFLEX) 2 MG tablet   Class: E-Prescribe   Order: [id number]   E-Prescribing Status: Receipt confirmed by pharmacy (7/22/2019  1:37 PM CDT)       Last Office Visit: 7/22/2019  Future Office visit:       Routing refill request to provider for review/approval because:  Drug not on the [facility], [facility]MP or  Health refill protocol or controlled substance

## 2019-07-23 LAB
CHOLEST SERPL-MCNC: 148 MG/DL
HDLC SERPL-MCNC: 44 MG/DL
LDLC SERPL CALC-MCNC: 68 MG/DL
NONHDLC SERPL-MCNC: 104 MG/DL
TRIGL SERPL-MCNC: 178 MG/DL
TSH SERPL DL<=0.005 MIU/L-ACNC: 0.87 MU/L (ref 0.4–4)

## 2019-07-25 RX ORDER — TIZANIDINE 2 MG/1
TABLET ORAL
Qty: 270 TABLET | Refills: 5 | Status: SHIPPED | OUTPATIENT
Start: 2019-07-25 | End: 2020-06-18

## 2019-09-16 ENCOUNTER — TELEPHONE (OUTPATIENT)
Dept: FAMILY MEDICINE | Facility: CLINIC | Age: 74
End: 2019-09-16

## 2019-09-16 NOTE — TELEPHONE ENCOUNTER
Reason for call:  Patient reporting a symptom    Symptom or request: pulled neck muscle     Duration (how long have symptoms been present): 5 days    Have you been treated for this before? n/a    Additional comments: Patient requesting for nurse to call with recommendations     Phone Number patient can be reached at:  Cell number on file:    Telephone Information:   Mobile [phone]       Best Time:  Anytime    Can we leave a detailed message on this number:  YES    Call taken on 9/16/2019 at 2:10 PM by Argenis Pena

## 2019-09-16 NOTE — TELEPHONE ENCOUNTER
Patient reports pulling a neck muscle about 6 days ago.  She has been taking muscle relaxants and applying heat. Rates pain an 8/10. She is not interested in making an appointment at this time.      Instructed patient to do neck stretches, apply ice 20 minutes every hour, and try over-the-counter analgesic patches or creams.    Patient will try these home remedies and will call back in a few days to schedule an appointment if needed.      Kian Hsieh RN

## 2019-09-30 DIAGNOSIS — M16.12 PRIMARY OSTEOARTHRITIS OF LEFT HIP: ICD-10-CM

## 2019-09-30 NOTE — TELEPHONE ENCOUNTER
Reason for Call:  Medication or medication refill:    Do you use a [facility]?  Name of the pharmacy and phone number for the current request:  [facility] [address] [phone]    Name of the medication requested: acetaminophen-codeine (TYLENOL #3) 300-30 MG tablet    Other request:     Can we leave a detailed message on this number? YES    Phone number patient can be reached at: Cell number on file:    Telephone Information:   Mobile [phone]       Best Time: anytime    Call taken on 9/30/2019 at 12:47 PM by Patience Joe

## 2019-09-30 NOTE — TELEPHONE ENCOUNTER
Routing refill request for Tylenol with codeine to providers' pool due to PCP's absence.      Last filled on 7/22.  Patient takes medication for osteoarthritis of L hip.       Kian Hsieh RN            Per OV note on 7/22 by Dr. Graves:     We also reviewed the new rules for prescribing narcotics. She is on tylenol #3 and had tolerated and used appropriately. Rx will be give today but she will have to call monthly for a new RX until new protocol is established by the clinis

## 2019-11-14 ENCOUNTER — TELEPHONE (OUTPATIENT)
Dept: FAMILY MEDICINE | Facility: CLINIC | Age: 74
End: 2019-11-14

## 2019-11-14 NOTE — TELEPHONE ENCOUNTER
Route to PCP for review - see below message.  Are you able to do injections at that visit?    Aramis Negron RN

## 2019-11-14 NOTE — TELEPHONE ENCOUNTER
Reason for Call:  Other patient request    Detailed comments: patient is wanting to have cortisone shot in both knees. Patient has appointment for Dr. Graves on 11/18/2019 at 11:00 am.     Phone Number Patient can be reached at: Cell number on file:    Telephone Information:   Mobile [phone]       Best Time: anytime    Can we leave a detailed message on this number? YES    Call taken on 11/14/2019 at 2:31 PM by Diego Brown

## 2019-11-18 ENCOUNTER — OFFICE VISIT (OUTPATIENT)
Dept: FAMILY MEDICINE | Facility: CLINIC | Age: 74
End: 2019-11-18
Payer: COMMERCIAL

## 2019-11-18 VITALS — DIASTOLIC BLOOD PRESSURE: 82 MMHG | HEART RATE: 74 BPM | SYSTOLIC BLOOD PRESSURE: 118 MMHG | TEMPERATURE: 98.2 F

## 2019-11-18 DIAGNOSIS — M17.0 ARTHRITIS OF BOTH KNEES: Primary | ICD-10-CM

## 2019-11-18 DIAGNOSIS — M16.12 PRIMARY OSTEOARTHRITIS OF LEFT HIP: ICD-10-CM

## 2019-11-18 PROCEDURE — 20610 DRAIN/INJ JOINT/BURSA W/O US: CPT | Mod: 50 | Performed by: FAMILY MEDICINE

## 2019-11-18 PROCEDURE — 99212 OFFICE O/P EST SF 10 MIN: CPT | Mod: 25 | Performed by: FAMILY MEDICINE

## 2019-11-18 RX ORDER — TRIAMCINOLONE ACETONIDE 40 MG/ML
40 INJECTION, SUSPENSION INTRA-ARTICULAR; INTRAMUSCULAR ONCE
Qty: 1 ML | Refills: 0 | OUTPATIENT
Start: 2019-11-18 | End: 2019-11-18

## 2019-11-18 ASSESSMENT — ANXIETY QUESTIONNAIRES
1. FEELING NERVOUS, ANXIOUS, OR ON EDGE: NOT AT ALL
5. BEING SO RESTLESS THAT IT IS HARD TO SIT STILL: NOT AT ALL
3. WORRYING TOO MUCH ABOUT DIFFERENT THINGS: NOT AT ALL
2. NOT BEING ABLE TO STOP OR CONTROL WORRYING: NOT AT ALL
IF YOU CHECKED OFF ANY PROBLEMS ON THIS QUESTIONNAIRE, HOW DIFFICULT HAVE THESE PROBLEMS MADE IT FOR YOU TO DO YOUR WORK, TAKE CARE OF THINGS AT HOME, OR GET ALONG WITH OTHER PEOPLE: NOT DIFFICULT AT ALL
6. BECOMING EASILY ANNOYED OR IRRITABLE: NOT AT ALL

## 2019-11-18 ASSESSMENT — PATIENT HEALTH QUESTIONNAIRE - PHQ9
SUM OF ALL RESPONSES TO PHQ QUESTIONS 1-9: 9
5. POOR APPETITE OR OVEREATING: NOT AT ALL

## 2019-11-18 ASSESSMENT — PAIN SCALES - GENERAL: PAINLEVEL: EXTREME PAIN (8)

## 2019-11-18 NOTE — PROGRESS NOTES
Subjective     Argenis Marcos is a 73 year old female who presents to clinic today for the following health issues:    HPI   Chronic Pain Follow-Up       Type / Location of Pain: Both knees  Analgesia/pain control:       Recent changes:  worse      Overall control: Inadequate pain control  Activity level/function:      Daily activities:  Can do most things most days, with some rest    Work:  not applicable  Adverse effects:  No  Adherance    Taking medication as directed?  Yes    Participating in other treatments: walking up and down stairs  Risk Factors:    Sleep:  Good    Mood/anxiety:  controlled    Recent family or social stressors:  none noted    Other aggravating factors: prolonged sitting     She also needs refill of her Tylenol # 3. For chronic hip pain. Uses appopriately  PHQ-9 SCORE 2/26/2015 8/31/2015 10/31/2016   PHQ-9 Total Score 0 - -   PHQ-9 Total Score - 2 0     No flowsheet data found.  Encounter-Level CSA:    There are no encounter-level csa.     Patient-Level CSA:    There are no patient-level csa.         How many servings of fruits and vegetables do you eat daily?  2-3    On average, how many sweetened beverages do you drink each day (soda, juice, sweet tea, etc)?   1    How many days per week do you miss taking your medication? 0            Reviewed and updated as needed this visit by Provider         Review of Systems   ROS COMP: CONSTITUTIONAL: NEGATIVE for fever, chills, change in weight  ENT/MOUTH: NEGATIVE for ear, mouth and throat problems  RESP: NEGATIVE for significant cough or SOB  CV: NEGATIVE for chest pain, palpitations or peripheral edema  MUSCULOSKELETAL: bilateral knee pain. Getting more difficult to walk. Using cane and walker      Objective    /82 (BP Location: Right arm, Patient Position: Sitting, Cuff Size: Adult Regular)   Pulse 74   Temp 98.2  F (36.8  C) (Oral)   There is no height or weight on file to calculate BMI.  Physical Exam   GENERAL: alert, no distress,  frail and elderly  NECK: no adenopathy, no asymmetry, masses, or scars and thyroid normal to palpation  RESP: lungs clear to auscultation - no rales, rhonchi or wheezes  CV: regular rate and rhythm, normal S1 S2, no S3 or S4, no murmur, click or rub, no peripheral edema and peripheral pulses strong  ABDOMEN: soft, nontender, no hepatosplenomegaly, no masses and bowel sounds normal  MS: arthritis of the knees bilaterally    Discussed options for treatment of knee OA including oral medication, joint injection, synvisc and surgery. Pt is failing current oral meds and would like injection today. We discussed the risks of injection, including infection of the joint, pain flare up, and not being effective.   Injection site was anesthetized with 0.5 cc of plain 1% lidocaine. In sterile fashion, the right and left anterior knees cleaned with betadine x3. Then 40mg kenalog and 4cc of plain lidocaine  was injected into right knee using the  Medial   Approach with injections into both knees.  Pt tolerated procedure well. Followup if not improving over the next couple of weeks.    Kenalog  Lot # [id number]  EXP Date 12/2020      Diagnostic Test Results:  none         Assessment & Plan   Assessment      Plan  (M17.0) Arthritis of both knees  (primary encounter diagnosis)  Comment:   Plan: DRAIN/INJECT LARGE JOINT/BURSA, triamcinolone         (KENALOG) 40 MG/ML injection, DRAIN/INJECT         LARGE JOINT/BURSA, triamcinolone (KENALOG) 40         MG/ML injection, DAMIAN PT, HAND, AND CHIROPRACTIC        REFERRAL, acetaminophen-codeine (TYLENOL #3)         300-30 MG tablet            (M16.12) Primary osteoarthritis of left hip  Comment: refill of medications uses [facility]  Plan: acetaminophen-codeine (TYLENOL #3) 300-30 MG         tablet        refilled        Patient Instructions   [facility]- [phone]   ___________________________________________      [facility] would be close      Return in  about 3 months (around 2/18/2020) for Review response to medications.    Obey Graves MD, MD  [facility]

## 2019-11-18 NOTE — PATIENT INSTRUCTIONS
[facility] [facility]- [phone]   ___________________________________________      [facility] would be close

## 2019-12-05 ENCOUNTER — THERAPY VISIT (OUTPATIENT)
Dept: PHYSICAL THERAPY | Facility: CLINIC | Age: 74
End: 2019-12-05
Attending: FAMILY MEDICINE
Payer: COMMERCIAL

## 2019-12-05 DIAGNOSIS — Z74.09 IMPAIRED FUNCTIONAL MOBILITY, BALANCE, GAIT, AND ENDURANCE: ICD-10-CM

## 2019-12-05 DIAGNOSIS — M25.562 LEFT KNEE PAIN: ICD-10-CM

## 2019-12-05 DIAGNOSIS — M17.0 ARTHRITIS OF BOTH KNEES: ICD-10-CM

## 2019-12-05 PROCEDURE — 97110 THERAPEUTIC EXERCISES: CPT | Mod: GP | Performed by: PHYSICAL THERAPIST

## 2019-12-05 PROCEDURE — 97112 NEUROMUSCULAR REEDUCATION: CPT | Mod: GP | Performed by: PHYSICAL THERAPIST

## 2019-12-05 PROCEDURE — 97161 PT EVAL LOW COMPLEX 20 MIN: CPT | Mod: GP | Performed by: PHYSICAL THERAPIST

## 2019-12-05 ASSESSMENT — ACTIVITIES OF DAILY LIVING (ADL)
AS_A_RESULT_OF_YOUR_KNEE_INJURY,_HOW_WOULD_YOU_RATE_YOUR_CURRENT_LEVEL_OF_DAILY_ACTIVITY?: ABNORMAL
PAIN: THE SYMPTOM AFFECTS MY ACTIVITY MODERATELY
HOW_WOULD_YOU_RATE_THE_OVERALL_FUNCTION_OF_YOUR_KNEE_DURING_YOUR_USUAL_DAILY_ACTIVITIES?: NEARLY NORMAL
STAND: ACTIVITY IS SOMEWHAT DIFFICULT
GIVING WAY, BUCKLING OR SHIFTING OF KNEE: I DO NOT HAVE THE SYMPTOM
KNEEL ON THE FRONT OF YOUR KNEE: ACTIVITY IS MINIMALLY DIFFICULT
GO DOWN STAIRS: ACTIVITY IS FAIRLY DIFFICULT
RAW_SCORE: 50
WEAKNESS: THE SYMPTOM AFFECTS MY ACTIVITY SLIGHTLY
WALK: ACTIVITY IS VERY DIFFICULT
SIT WITH YOUR KNEE BENT: ACTIVITY IS NOT DIFFICULT
STIFFNESS: I HAVE THE SYMPTOM BUT IT DOES NOT AFFECT MY ACTIVITY
KNEE_ACTIVITY_OF_DAILY_LIVING_SUM: 50
HOW_WOULD_YOU_RATE_THE_CURRENT_FUNCTION_OF_YOUR_KNEE_DURING_YOUR_USUAL_DAILY_ACTIVITIES_ON_A_SCALE_FROM_0_TO_100_WITH_100_BEING_YOUR_LEVEL_OF_KNEE_FUNCTION_PRIOR_TO_YOUR_INJURY_AND_0_BEING_THE_INABILITY_TO_PERFORM_ANY_OF_YOUR_USUAL_DAILY_ACTIVITIES?: 50
LIMPING: I DO NOT HAVE THE SYMPTOM
GO UP STAIRS: ACTIVITY IS FAIRLY DIFFICULT
SQUAT: ACTIVITY IS NOT DIFFICULT
KNEE_ACTIVITY_OF_DAILY_LIVING_SCORE: 71.43
RISE FROM A CHAIR: ACTIVITY IS MINIMALLY DIFFICULT
SWELLING: I DO NOT HAVE THE SYMPTOM

## 2019-12-06 PROBLEM — M25.562 LEFT KNEE PAIN: Status: ACTIVE | Noted: 2019-12-06

## 2019-12-06 PROBLEM — Z74.09 IMPAIRED FUNCTIONAL MOBILITY, BALANCE, GAIT, AND ENDURANCE: Status: ACTIVE | Noted: 2019-12-06

## 2019-12-06 NOTE — PROGRESS NOTES
[facility] Initial Evaluation  Subjective:  The history is provided by the patient.   Type of problem:  Bilateral knees (L>R)   Condition occurred with:  Insidious onset. This is a chronic condition   Problem details: Pain has been ongoing for about 8 years.   Patient reports pain:  Anterior, medial and lateral. Radiates to:  Low back and hip. Associated symptoms:  Loss of strength, loss of motion/stiffness and buckling/giving out. Symptoms are exacerbated by ascending stairs, bending/squatting, descending stairs, transfers, walking and standing and relieved by rest.    Where condition occurred: for unknown reasons.  and reported as 8/10 on pain scale. General health as reported by patient is good. Pertinent medical history includes:  Depression, menopausal, osteoarthritis, migraines/headaches and thyroid problems.    Surgeries include:  None.  Current medications:  Muscle relaxants, thyroid medication and anti-inflammatory.     Pain is described as aching and sharp and is intermittent. Pain timing: no pattern. Since onset symptoms are gradually worsening.      Patient is None.   Barriers include:  Bathroom/bedroom on second floor.  Red flags:  None as reported by patient.         Knee Activity of Daily Living Score: 71.43            Objective:    Gait:    Assistive Devices:  Walker  Deviations:  General Deviations:  Thais decr               Lumbar/SI Evaluation  ROM:  AROM Lumbar: normal      Lumbar Myotomes:  normal                                                              Hip Evaluation  HIP AROM:  AROM:    Left Hip:     Normal    Right Hip:   Normal                               Knee Evaluation:  ROM:  AROM: normal            Strength:         Quad Set Left: Delayed and fair    Pain:   Quad Set Right: Fair and delayed    Pain:      Palpation:  Normal      Edema:  Normal                General Evaluation:        Lower Extremity Strength:  Strength wnl lower extremity general: 3+/5  doug.                      Balance:          Sit to Stand Test: 4/reps      Functional Assessment:        Gait speed:  .4/m/s                                         ROS    Assessment/Plan:    Patient is a 74 year old female with both sides knee complaints.    Patient has the following significant findings with corresponding treatment plan.                Diagnosis 1:  B knee pain, L>R  Pain -  hot/cold therapy, manual therapy, self management, education, directional preference exercise and home program  Decreased ROM/flexibility - manual therapy, therapeutic exercise and home program  Decreased strength - therapeutic exercise, therapeutic activities and home program  Impaired gait - gait training, assistive devices and home program    Therapy Evaluation Codes:   1) History comprised of:   Personal factors that impact the plan of care:      None.    Comorbidity factors that impact the plan of care are:      None.     Medications impacting care: None.  2) Examination of Body Systems comprised of:   Body structures and functions that impact the plan of care:      Hip, Knee and Lumbar spine.   Activity limitations that impact the plan of care are:      Bathing, Sitting, Squatting/kneeling, Stairs, Standing and Walking.  3) Clinical presentation characteristics are:   Stable/Uncomplicated.  4) Decision-Making    Low complexity using standardized patient assessment instrument and/or measureable assessment of functional outcome.  Cumulative Therapy Evaluation is: Low complexity.    Previous and current functional limitations:  (See Goal Flow Sheet for this information)    Short term and Long term goals: (See Goal Flow Sheet for this information)     Communication ability:  Patient appears to be able to clearly communicate and understand verbal and written communication and follow directions correctly.  Treatment Explanation - The following has been discussed with the patient:   RX ordered/plan of care  Anticipated  outcomes  Possible risks and side effects  This patient would benefit from PT intervention to resume normal activities.   Rehab potential is good.    Frequency:  1 X week, once daily  Duration:  for 8 weeks  Discharge Plan:  Achieve all LTG.  Independent in home treatment program.  Reach maximal therapeutic benefit.    Please refer to the daily flowsheet for treatment today, total treatment time and time spent performing 1:1 timed codes.

## 2019-12-13 ENCOUNTER — THERAPY VISIT (OUTPATIENT)
Dept: PHYSICAL THERAPY | Facility: CLINIC | Age: 74
End: 2019-12-13
Payer: COMMERCIAL

## 2019-12-13 DIAGNOSIS — Z74.09 IMPAIRED FUNCTIONAL MOBILITY, BALANCE, GAIT, AND ENDURANCE: ICD-10-CM

## 2019-12-13 DIAGNOSIS — M25.562 LEFT KNEE PAIN: ICD-10-CM

## 2019-12-13 PROCEDURE — 97110 THERAPEUTIC EXERCISES: CPT | Mod: GP | Performed by: PHYSICAL THERAPIST

## 2019-12-13 PROCEDURE — 97112 NEUROMUSCULAR REEDUCATION: CPT | Mod: GP | Performed by: PHYSICAL THERAPIST

## 2019-12-17 ENCOUNTER — THERAPY VISIT (OUTPATIENT)
Dept: PHYSICAL THERAPY | Facility: CLINIC | Age: 74
End: 2019-12-17
Payer: COMMERCIAL

## 2019-12-17 DIAGNOSIS — M25.562 LEFT KNEE PAIN: ICD-10-CM

## 2019-12-17 DIAGNOSIS — Z74.09 IMPAIRED FUNCTIONAL MOBILITY, BALANCE, GAIT, AND ENDURANCE: ICD-10-CM

## 2019-12-17 PROCEDURE — 97110 THERAPEUTIC EXERCISES: CPT | Mod: GP | Performed by: PHYSICAL THERAPIST

## 2019-12-17 PROCEDURE — 97112 NEUROMUSCULAR REEDUCATION: CPT | Mod: GP | Performed by: PHYSICAL THERAPIST

## 2020-01-02 ENCOUNTER — THERAPY VISIT (OUTPATIENT)
Dept: PHYSICAL THERAPY | Facility: CLINIC | Age: 75
End: 2020-01-02
Payer: COMMERCIAL

## 2020-01-02 DIAGNOSIS — M25.562 LEFT KNEE PAIN: ICD-10-CM

## 2020-01-02 DIAGNOSIS — Z74.09 IMPAIRED FUNCTIONAL MOBILITY, BALANCE, GAIT, AND ENDURANCE: ICD-10-CM

## 2020-01-02 PROCEDURE — 97110 THERAPEUTIC EXERCISES: CPT | Mod: GP | Performed by: PHYSICAL THERAPIST

## 2020-01-02 PROCEDURE — 97112 NEUROMUSCULAR REEDUCATION: CPT | Mod: GP | Performed by: PHYSICAL THERAPIST

## 2020-01-20 ENCOUNTER — TELEPHONE (OUTPATIENT)
Dept: FAMILY MEDICINE | Facility: CLINIC | Age: 75
End: 2020-01-20

## 2020-01-20 DIAGNOSIS — M17.0 ARTHRITIS OF BOTH KNEES: ICD-10-CM

## 2020-01-20 DIAGNOSIS — M16.12 PRIMARY OSTEOARTHRITIS OF LEFT HIP: ICD-10-CM

## 2020-01-20 NOTE — TELEPHONE ENCOUNTER
Requested Prescriptions   Pending Prescriptions Disp Refills     acetaminophen-codeine (TYLENOL #3) 300-30 MG tablet [Pharmacy Med Name: ACETAMINOPHEN/COD #3 (300/30MG) TAB] 90 tablet      Sig: TAKE 1 TABLET BY MOUTH THREE TIMES DAILY         Last Written Prescription Date:  11/18/2019  Last Fill Quantity: 90 tabs,   # refills: 1  Last Office Visit: 11/18/2019  Future Office visit:    Next 5 appointments (look out 90 days)    Feb 11, 2020  1:00 PM CST  [facility] [address] [place] [id number] [phone]           Routing refill request to provider for review/approval because:  Drug not on the [facility], [facility] or [facility] refill protocol or controlled substance

## 2020-01-23 NOTE — TELEPHONE ENCOUNTER
Reason for Call:  Other     Detailed comments: Patient calling to check status on refill, please advise.     Phone Number Patient can be reached at: Home number on file [phone] (home)    Best Time: Anytime    Can we leave a detailed message on this number? YES    Call taken on 1/23/2020 at 4:46 PM by Argenis Pena

## 2020-01-27 ENCOUNTER — THERAPY VISIT (OUTPATIENT)
Dept: PHYSICAL THERAPY | Facility: CLINIC | Age: 75
End: 2020-01-27
Payer: COMMERCIAL

## 2020-01-27 DIAGNOSIS — M25.562 LEFT KNEE PAIN: ICD-10-CM

## 2020-01-27 DIAGNOSIS — Z74.09 IMPAIRED FUNCTIONAL MOBILITY, BALANCE, GAIT, AND ENDURANCE: ICD-10-CM

## 2020-01-27 PROCEDURE — 97110 THERAPEUTIC EXERCISES: CPT | Mod: GP | Performed by: PHYSICAL THERAPIST

## 2020-01-27 PROCEDURE — 97112 NEUROMUSCULAR REEDUCATION: CPT | Mod: GP | Performed by: PHYSICAL THERAPIST

## 2020-02-06 ENCOUNTER — THERAPY VISIT (OUTPATIENT)
Dept: PHYSICAL THERAPY | Facility: CLINIC | Age: 75
End: 2020-02-06
Payer: COMMERCIAL

## 2020-02-06 DIAGNOSIS — M25.562 LEFT KNEE PAIN: ICD-10-CM

## 2020-02-06 DIAGNOSIS — Z74.09 IMPAIRED FUNCTIONAL MOBILITY, BALANCE, GAIT, AND ENDURANCE: ICD-10-CM

## 2020-02-06 PROCEDURE — 97112 NEUROMUSCULAR REEDUCATION: CPT | Mod: GP | Performed by: PHYSICAL THERAPIST

## 2020-02-06 PROCEDURE — 97110 THERAPEUTIC EXERCISES: CPT | Mod: GP | Performed by: PHYSICAL THERAPIST

## 2020-03-05 ENCOUNTER — THERAPY VISIT (OUTPATIENT)
Dept: PHYSICAL THERAPY | Facility: CLINIC | Age: 75
End: 2020-03-05
Payer: COMMERCIAL

## 2020-03-05 DIAGNOSIS — Z74.09 IMPAIRED FUNCTIONAL MOBILITY, BALANCE, GAIT, AND ENDURANCE: ICD-10-CM

## 2020-03-05 PROCEDURE — 97110 THERAPEUTIC EXERCISES: CPT | Mod: GP | Performed by: PHYSICAL THERAPIST

## 2020-03-05 PROCEDURE — 97112 NEUROMUSCULAR REEDUCATION: CPT | Mod: GP | Performed by: PHYSICAL THERAPIST

## 2020-03-23 DIAGNOSIS — M17.0 ARTHRITIS OF BOTH KNEES: ICD-10-CM

## 2020-03-23 DIAGNOSIS — M16.12 PRIMARY OSTEOARTHRITIS OF LEFT HIP: ICD-10-CM

## 2020-03-23 NOTE — TELEPHONE ENCOUNTER
Requested Prescriptions   Pending Prescriptions Disp Refills     acetaminophen-codeine (TYLENOL #3) 300-30 MG tablet [Pharmacy Med Name: ACETAMINOPHEN/COD #3 (300/30MG) TAB]        Last Written Prescription Date:  1/21/2020  Last Fill Quantity: 90 tabs,   # refills: 1  Last Office Visit: 11/18/2019  Future Office visit:    Next 5 appointments (look out 90 days)    Apr 06, 2020 10:40 AM CDT  SHORT with Obey Graves MD  [facility] ([facility]) [address] [place] [id number] [phone]           Routing refill request to provider for review/approval because:  Drug not on the [facility], P or  Health refill protocol or controlled substance 90 tablet      Sig: TAKE 1 TABLET BY MOUTH THREE TIMES DAILY       There is no refill protocol information for this order

## 2020-04-03 ENCOUNTER — TELEPHONE (OUTPATIENT)
Dept: PHYSICAL THERAPY | Facility: CLINIC | Age: 75
End: 2020-04-03

## 2020-04-03 DIAGNOSIS — M25.562 LEFT KNEE PAIN, UNSPECIFIED CHRONICITY: ICD-10-CM

## 2020-04-03 DIAGNOSIS — Z74.09 IMPAIRED FUNCTIONAL MOBILITY, BALANCE, GAIT, AND ENDURANCE: ICD-10-CM

## 2020-04-03 NOTE — TELEPHONE ENCOUNTER
Left Message for patient to call back regarding PT appointment.  Appointment on 4/16/2020 was cancelled due to Covid Crisis.  If patient would like she can schedule a video or phone follow up appointment with Mayte Easton.

## 2020-04-23 DIAGNOSIS — M16.12 PRIMARY OSTEOARTHRITIS OF LEFT HIP: ICD-10-CM

## 2020-04-23 DIAGNOSIS — M17.0 ARTHRITIS OF BOTH KNEES: ICD-10-CM

## 2020-04-23 NOTE — TELEPHONE ENCOUNTER
Requested Prescriptions   Pending Prescriptions Disp Refills     acetaminophen-codeine (TYLENOL #3) 300-30 MG tablet [Pharmacy Med Name: ACETAMINOPHEN/COD #3 (300/30MG) TAB]        Last Written Prescription Date:  3/23/2020  Last Fill Quantity: 90 tabs,   # refills: 0  Last Office Visit: 11/18/2019  Future Office visit:    Next 5 appointments (look out 90 days)    May 21, 2020  2:40 PM CDT  Telephone Visit with Obey Graves MD  [address] [id number] [phone]   May 27, 2020  2:40 PM CDT  SHORT with Obey Graves MD  [address] [id number] [phone]           Routing refill request to provider for review/approval because:  Drug not on the [facility], [facility] or  Health refill protocol or controlled substance 90 tablet      Sig: TAKE 1 TABLET BY MOUTH THREE TIMES DAILY       There is no refill protocol information for this order

## 2020-04-25 ENCOUNTER — NURSE TRIAGE (OUTPATIENT)
Dept: NURSING | Facility: CLINIC | Age: 75
End: 2020-04-25

## 2020-04-25 NOTE — TELEPHONE ENCOUNTER
"Patient calling to check the status of her medication refill.  Looked up in chart. Writer can see the refill request but does not look like it has been ordered yet.      Patient reports that she has a few days worth a medication left.  Advised pt to contact clinic on Monday when open to check on the status.  Patient stated understanding.     Aracelis Hollins RN/[facility]    Reason for Disposition    Caller has NON-URGENT medication question about med that PCP prescribed and triager unable to answer question    Additional Information    Negative: Drug overdose and nurse unable to answer question    Negative: Caller requesting information not related to medicine    Negative: Caller requesting a prescription for Strep throat and has a positive culture result    Negative: Rash while taking a medication or within 3 days of stopping it    Negative: Immunization reaction suspected    Negative: [1] Asthma and [2] having symptoms of asthma (cough, wheezing, etc)    Negative: MORE THAN A DOUBLE DOSE of a prescription or over-the-counter (OTC) drug    Negative: [1] DOUBLE DOSE (an extra dose or lesser amount) of over-the-counter (OTC) drug AND [2] any symptoms (e.g., dizziness, nausea, pain, sleepiness)    Negative: [1] DOUBLE DOSE (an extra dose or lesser amount) of prescription drug AND [2] any symptoms (e.g., dizziness, nausea, pain, sleepiness)    Negative: Took another person's prescription drug    Negative: [1] DOUBLE DOSE (an extra dose or lesser amount) of prescription drug AND [2] NO symptoms (Exception: a double dose of antibiotics)    Negative: Diabetes drug error or overdose (e.g., insulin or extra dose)    Negative: [1] Request for URGENT new prescription or refill of \"essential\" medication (i.e., likelihood of harm to patient if not taken) AND [2] triager unable to fill per unit policy    Negative: [1] Prescription not at pharmacy AND [2] was prescribed today by PCP    Negative: Pharmacy " calling with prescription questions and triager unable to answer question    Negative: Caller has URGENT medication question about med that PCP prescribed and triager unable to answer question    Protocols used: MEDICATION QUESTION CALL-A-AH

## 2020-04-29 NOTE — TELEPHONE ENCOUNTER
Routing refill request to provider for review/approval because:  Drug not on the [facility] refill protocol   DIANE MartelN, RN

## 2020-05-21 ENCOUNTER — TELEPHONE (OUTPATIENT)
Dept: FAMILY MEDICINE | Facility: CLINIC | Age: 75
End: 2020-05-21

## 2020-05-21 ENCOUNTER — VIRTUAL VISIT (OUTPATIENT)
Dept: FAMILY MEDICINE | Facility: CLINIC | Age: 75
End: 2020-05-21
Payer: COMMERCIAL

## 2020-05-21 DIAGNOSIS — H60.92 OTITIS EXTERNA OF LEFT EAR, UNSPECIFIED CHRONICITY, UNSPECIFIED TYPE: ICD-10-CM

## 2020-05-21 DIAGNOSIS — M17.0 ARTHRITIS OF BOTH KNEES: ICD-10-CM

## 2020-05-21 DIAGNOSIS — B37.2 CANDIDAL INTERTRIGO: ICD-10-CM

## 2020-05-21 DIAGNOSIS — M16.12 PRIMARY OSTEOARTHRITIS OF LEFT HIP: Primary | ICD-10-CM

## 2020-05-21 DIAGNOSIS — N89.8 VAGINAL ITCHING: ICD-10-CM

## 2020-05-21 DIAGNOSIS — L25.9 CONTACT DERMATITIS, UNSPECIFIED CONTACT DERMATITIS TYPE, UNSPECIFIED TRIGGER: ICD-10-CM

## 2020-05-21 PROCEDURE — 99214 OFFICE O/P EST MOD 30 MIN: CPT | Mod: TEL | Performed by: FAMILY MEDICINE

## 2020-05-21 RX ORDER — MICONAZOLE NITRATE
1 POWDER (GRAM) MISCELLANEOUS DAILY PRN
Qty: 1 BOTTLE | Refills: 3 | Status: SHIPPED | OUTPATIENT
Start: 2020-05-21

## 2020-05-21 RX ORDER — NEOMYCIN SULFATE, POLYMYXIN B SULFATE AND HYDROCORTISONE 10; 3.5; 1 MG/ML; MG/ML; [USP'U]/ML
3 SUSPENSION/ DROPS AURICULAR (OTIC) 3 TIMES DAILY
Qty: 10 ML | Refills: 3 | Status: SHIPPED | OUTPATIENT
Start: 2020-05-21 | End: 2022-03-23

## 2020-05-21 RX ORDER — BETAMETHASONE DIPROPIONATE 0.5 MG/G
OINTMENT, AUGMENTED TOPICAL
Refills: 6 | Status: CANCELLED | OUTPATIENT
Start: 2020-05-21

## 2020-05-21 RX ORDER — CLOTRIMAZOLE 1 %
CREAM WITH APPLICATOR VAGINAL
Qty: 45 G | Refills: 3 | Status: SHIPPED | OUTPATIENT
Start: 2020-05-21 | End: 2021-06-07

## 2020-05-21 NOTE — PATIENT INSTRUCTIONS
Orders Placed This Encounter     acetaminophen-codeine (TYLENOL #3) 300-30 MG tablet     Sig: Take 1 tablet by mouth every 8 hours as needed for severe pain And ok to take 1 extra if needed     Dispense:  100 tablet     Refill:  3     clotrimazole (LOTRIMIN) 1 % vaginal cream     Sig: PLACE 1 APPLICATORFUL IN THE VAGINA DAILY AND APPLY EXTERNALLY TWICE DAILY AS DIRECTED     Dispense:  45 g     Refill:  3     Miconazole Nitrate 2 % POWD     Si Application daily as needed     Dispense:  1 Bottle     Refill:  3     Phone follow up in 1 month for recheck

## 2020-05-21 NOTE — PROGRESS NOTES
"Argenis Marcos is a 74 year old female who is being evaluated via a billable telephone visit.      The patient has been notified of following:     \"This telephone visit will be conducted via a call between you and your physician/provider. We have found that certain health care needs can be provided without the need for a physical exam.  This service lets us provide the care you need with a short phone conversation.  If a prescription is necessary we can send it directly to your pharmacy.  If lab work is needed we can place an order for that and you can then stop by our lab to have the test done at a later time.    Telephone visits are billed at different rates depending on your insurance coverage. During this emergency period, for some insurers they may be billed the same as an in-person visit.  Please reach out to your insurance provider with any questions.    If during the course of the call the physician/provider feels a telephone visit is not appropriate, you will not be charged for this service.\"    Patient has given verbal consent for Telephone visit?  Yes    What phone number would you like to be contacted at? [phone]    How would you like to obtain your AVS? Mail a copy    Subjective     Argenis Marcos is a 74 year old female who presents via phone visit today for the following health issues:    HPI  Medication Followup of Tylenol 3    Taking Medication as prescribed: yes    Side Effects:  None    Medication Helping Symptoms:  Yes    Patient says that going to PT really helped and she is enjoying them.      Concern - Muscle spasms  Onset: for a while, frequency has increased    Description:   Patient says she has been getting muscle spasms more frequently in the hip    Intensity: severe, 8-9/19    Progression of Symptoms:  same    Accompanying Signs & Symptoms:  Hear popping sounds    Previous history of similar problem:   Yes    Precipitating factors:   Worsened by: bending down    Alleviating " factors:  Improved by: Ice, Heat pad, PT, and injections    Therapies Tried and outcome: Ice, Heat, PT, and injections help some.          She would like to get another knee injection but want to wait at this time    Reviewed medications and ok to refill  Hip pain increasing. She had x-ry in 2010 that showed DJD    She also notes intermittent ear pain, itching and popping in ears. No URI symptoms. No hearing loss.    Reviewed COVID precautions. She is following good precautions            Reviewed and updated as needed this visit by Provider         Review of Systems   Constitutional, HEENT, cardiovascular, pulmonary, gi and gu systems are negative, except as otherwise noted.       Objective   Reported vitals:  There were no vitals taken for this visit.   alert and no distress  PSYCH: Alert and oriented times 3; coherent speech, normal   rate and volume, able to articulate logical thoughts, able   to abstract reason, no tangential thoughts, no hallucinations   or delusions  Her affect is normal  RESP: No cough, no audible wheezing, able to talk in full sentences  Remainder of exam unable to be completed due to telephone visits    Diagnostic Test Results:  Labs reviewed in Epic        Assessment/Plan:  1. Primary osteoarthritis of left hip    - acetaminophen-codeine (TYLENOL #3) 300-30 MG tablet; Take 1 tablet by mouth every 8 hours as needed for severe pain And ok to take 1 extra if needed  Dispense: 100 tablet; Refill: 3    2. Arthritis of both knees  Potential follow up for injection in July  - acetaminophen-codeine (TYLENOL #3) 300-30 MG tablet; Take 1 tablet by mouth every 8 hours as needed for severe pain And ok to take 1 extra if needed  Dispense: 100 tablet; Refill: 3    3. Vaginal itching  Ok to refill  - clotrimazole (LOTRIMIN) 1 % vaginal cream; PLACE 1 APPLICATORFUL IN THE VAGINA DAILY AND APPLY EXTERNALLY TWICE DAILY AS DIRECTED  Dispense: 45 g; Refill: 3    4. Candidal intertrigo  Ok to refill  -  Miconazole Nitrate 2 % POWD; 1 Application daily as needed  Dispense: 1 Bottle; Refill: 3    Otitis Exteran    Cortisporin otic drop ordered          Return in about 4 weeks (around 6/18/2020) for Review response to medications  with phone folllow up.      Phone call duration:  10 minutes    Obey Graves MD, MD

## 2020-05-21 NOTE — TELEPHONE ENCOUNTER
Patient had virtual visit today with Dr. Graves.    I don't see any notes about her ears; I called patient, she says she discussed ear pain with Dr. Graves today and he was going to send ear drops but must have forgotten.    Pharmacy cued, routed to Dr. Graves to address.    Molly Altamirano RN  [facility]

## 2020-05-21 NOTE — TELEPHONE ENCOUNTER
Reason for Call:  Other / Medication    Detailed comments: Patient called and stated she was prescribed ear drops by her PCP, however, the pharmacy did not receive the order.  Patient is requesting it be re sent in order to be able to start treatment as soon as possible.    Phone Number Patient can be reached at: Cell number on file:    Telephone Information:   Mobile [phone]       Best Time: ASAP    Can we leave a detailed message on this number? YES    Call taken on 5/21/2020 at 4:19 PM by Princess Jimenez

## 2020-05-22 ENCOUNTER — TELEPHONE (OUTPATIENT)
Dept: FAMILY MEDICINE | Facility: CLINIC | Age: 75
End: 2020-05-22

## 2020-05-22 NOTE — TELEPHONE ENCOUNTER
Obey Graves MD  P Ne Team [facility]               Phone follow up 1 month for review of medications

## 2020-05-22 NOTE — TELEPHONE ENCOUNTER
Prior Authorization Retail Medication Request    Medication/Dose: Miconazole Nitrate 2 % POWD     ICD code (if different than what is on RX):  na  Previously Tried and Failed:  na  Rationale:  na    Insurance Name:  ibrahima  Insurance ID:  na      Pharmacy Information (if different than what is on RX)  Name:  [facility]  Phone:  na

## 2020-05-22 NOTE — TELEPHONE ENCOUNTER
PRIOR AUTHORIZATION DENIED    Medication: Miconazole Nitrate 2 % POWD     Denial Date:  05/22/2020    Denial Rational: Per insurance, medication is excluded from patient's benefit plan and will not be covered. Review and appeal are not available because of this exclusion.        Appeal Information:  N/A

## 2020-05-26 NOTE — TELEPHONE ENCOUNTER
Called patient and notified her that Miconazole is not covered by her insurance. Patient to call insurance company to find out what alternative would be covered and call the clinic back.  Eunice De Leon CMA ([facility])

## 2020-05-27 ENCOUNTER — TELEPHONE (OUTPATIENT)
Dept: FAMILY MEDICINE | Facility: CLINIC | Age: 75
End: 2020-05-27

## 2020-05-27 NOTE — TELEPHONE ENCOUNTER
Obey Graves MD Lopez, Diego A, CMA; [facility] Team [facility]               Please call patient and confirm she wants the knee injection. In my last conversation with her she was wanting to wait until we reopened the [facility] clinic     ,Carlos A Graves MD

## 2020-05-27 NOTE — TELEPHONE ENCOUNTER
Reason for Call:  Medication or medication refill:    Do you use a [facility]?  Name of the pharmacy and phone number for the current request:  [id number] [address] AT [facility] [phone] (Phone)  [phone] (Fax)       Name of the medication requested: clotrimazole (SM CLOTRIMAZOLE VAGINAL) 1 % vaginal creamPlace 1 Applicatorful vaginally daily And apply externally bid       Other request: PT called and current prescription not covered by insurance and looking for an alternative.     Can we leave a detailed message on this number? YES    Phone number patient can be reached at: Home number on file [phone] (home)    Best Time: Anytime    Call taken on 5/27/2020 at 11:24 AM by Christin Daley

## 2020-05-27 NOTE — TELEPHONE ENCOUNTER
Called patient and left a voicemail message and relayed Dr Graves message below.    Heidi Judge

## 2020-05-27 NOTE — TELEPHONE ENCOUNTER
Per pharmacy, Lotrimin 1% vaginal cream will cost patient 7.99 out of pocket due to insurance not covering it.      Spoke with patient, and she is willing to pay 7.99 for RX.    Kian Hsieh RN

## 2020-05-29 ENCOUNTER — TELEPHONE (OUTPATIENT)
Dept: FAMILY MEDICINE | Facility: CLINIC | Age: 75
End: 2020-05-29

## 2020-05-29 DIAGNOSIS — M16.12 PRIMARY OSTEOARTHRITIS OF LEFT HIP: ICD-10-CM

## 2020-05-29 DIAGNOSIS — M17.0 ARTHRITIS OF BOTH KNEES: ICD-10-CM
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
## 2020-05-29 NOTE — TELEPHONE ENCOUNTER
Spoke with pharmacist at the [facility], she states prescriptions are sitting there and waiting for patient to .    Called patient and [employer] her prescription were ready to .      Renata Ramirez RN

## 2020-05-29 NOTE — TELEPHONE ENCOUNTER
Patient calling back to clinic to find out if the nurse got to the bottom of her refills.  Patient states she went to pharmacy, got the Tylenol 3 script, but the pharmacist would not give her the cream and ear drop scripts.  Can someone please call patient back and let her know what is going on with these scripts?  Phone: [phone].  Thank you.

## 2020-05-29 NOTE — TELEPHONE ENCOUNTER
Patient states her normal pharmacy is closed today , she called [facility] on [address] [phone] to see if she can  her medicine there and was notified she can pick them up with the exception of acetaminophen-codeine (TYLENOL #3) 300-30 MG tablet. Patient was not given a reason as to why she would not be able to pick this up and is asking to team to look in to it or send a new script.    Patient would like to be contacted with an update.    Thank you,  Patience SLADE  [facility]  Team [facility] Coordinator

## 2020-06-02 NOTE — TELEPHONE ENCOUNTER
Called and spoke with patient- she thinks she is okay for now. Pharmacy is closed at this time anyways- she will get the Lotrimin OTC (see other encounter) and she has follow-up phone visit with Dr. Graves again on 6/18/20- if she feels she still needs it, she'll let him know then    Poppy Richards MA

## 2020-06-18 ENCOUNTER — VIRTUAL VISIT (OUTPATIENT)
Dept: FAMILY MEDICINE | Facility: CLINIC | Age: 75
End: 2020-06-18
Payer: COMMERCIAL

## 2020-06-18 DIAGNOSIS — M62.838 MUSCLE SPASM: ICD-10-CM

## 2020-06-18 DIAGNOSIS — M17.12 PRIMARY OSTEOARTHRITIS OF LEFT KNEE: Primary | ICD-10-CM

## 2020-06-18 DIAGNOSIS — G43.109 MIGRAINE WITH AURA AND WITHOUT STATUS MIGRAINOSUS, NOT INTRACTABLE: ICD-10-CM

## 2020-06-18 DIAGNOSIS — E03.4 HYPOTHYROIDISM DUE TO ACQUIRED ATROPHY OF THYROID: ICD-10-CM

## 2020-06-18 PROCEDURE — 99214 OFFICE O/P EST MOD 30 MIN: CPT | Mod: TEL | Performed by: FAMILY MEDICINE

## 2020-06-18 RX ORDER — RIZATRIPTAN BENZOATE 5 MG/1
5-10 TABLET, ORALLY DISINTEGRATING ORAL
Qty: 18 TABLET | Refills: 5 | Status: SHIPPED | OUTPATIENT
Start: 2020-06-18 | End: 2021-05-19

## 2020-06-18 RX ORDER — LEVOTHYROXINE SODIUM 75 UG/1
TABLET ORAL
Qty: 90 TABLET | Refills: 3 | Status: SHIPPED | OUTPATIENT
Start: 2020-06-18 | End: 2021-06-22

## 2020-06-18 RX ORDER — TIZANIDINE 2 MG/1
TABLET ORAL
Qty: 270 TABLET | Refills: 5 | Status: SHIPPED | OUTPATIENT
Start: 2020-06-18 | End: 2022-01-21

## 2020-07-26 DIAGNOSIS — E78.5 HYPERLIPIDEMIA LDL GOAL <130: ICD-10-CM

## 2020-07-26 RX ORDER — SIMVASTATIN 40 MG
TABLET ORAL
Qty: 90 TABLET | Refills: 3 | Status: SHIPPED | OUTPATIENT
Start: 2020-07-26 | End: 2021-10-04

## 2020-08-17 ENCOUNTER — OFFICE VISIT (OUTPATIENT)
Dept: FAMILY MEDICINE | Facility: CLINIC | Age: 75
End: 2020-08-17
Payer: COMMERCIAL

## 2020-08-17 VITALS
BODY MASS INDEX: 21.74 KG/M2 | HEART RATE: 86 BPM | DIASTOLIC BLOOD PRESSURE: 78 MMHG | SYSTOLIC BLOOD PRESSURE: 124 MMHG | HEIGHT: 68 IN | TEMPERATURE: 98.4 F

## 2020-08-17 DIAGNOSIS — M16.12 PRIMARY OSTEOARTHRITIS OF LEFT HIP: ICD-10-CM

## 2020-08-17 DIAGNOSIS — E78.5 HYPERLIPIDEMIA LDL GOAL <130: ICD-10-CM

## 2020-08-17 DIAGNOSIS — M17.0 ARTHRITIS OF BOTH KNEES: Primary | ICD-10-CM

## 2020-08-17 DIAGNOSIS — E03.4 HYPOTHYROIDISM DUE TO ACQUIRED ATROPHY OF THYROID: ICD-10-CM

## 2020-08-17 LAB
ERYTHROCYTE [DISTWIDTH] IN BLOOD BY AUTOMATED COUNT: 14.4 % (ref 10–15)
HCT VFR BLD AUTO: 43.5 % (ref 35–47)
HGB BLD-MCNC: 14.1 G/DL (ref 11.7–15.7)
MCH RBC QN AUTO: 29.6 PG (ref 26.5–33)
MCHC RBC AUTO-ENTMCNC: 32.4 G/DL (ref 31.5–36.5)
MCV RBC AUTO: 91 FL (ref 78–100)
PLATELET # BLD AUTO: 336 10E9/L (ref 150–450)
RBC # BLD AUTO: 4.76 10E12/L (ref 3.8–5.2)
WBC # BLD AUTO: 10.4 10E9/L (ref 4–11)

## 2020-08-17 PROCEDURE — 80048 BASIC METABOLIC PNL TOTAL CA: CPT | Performed by: FAMILY MEDICINE

## 2020-08-17 PROCEDURE — 80061 LIPID PANEL: CPT | Performed by: FAMILY MEDICINE

## 2020-08-17 PROCEDURE — 36415 COLL VENOUS BLD VENIPUNCTURE: CPT | Performed by: FAMILY MEDICINE

## 2020-08-17 PROCEDURE — 99213 OFFICE O/P EST LOW 20 MIN: CPT | Mod: 25 | Performed by: FAMILY MEDICINE

## 2020-08-17 PROCEDURE — 20610 DRAIN/INJ JOINT/BURSA W/O US: CPT | Mod: 50 | Performed by: FAMILY MEDICINE

## 2020-08-17 PROCEDURE — 85027 COMPLETE CBC AUTOMATED: CPT | Performed by: FAMILY MEDICINE

## 2020-08-17 PROCEDURE — 84443 ASSAY THYROID STIM HORMONE: CPT | Performed by: FAMILY MEDICINE

## 2020-08-17 RX ORDER — TRIAMCINOLONE ACETONIDE 40 MG/ML
40 INJECTION, SUSPENSION INTRA-ARTICULAR; INTRAMUSCULAR ONCE
Qty: 1 ML | Refills: 0 | OUTPATIENT
Start: 2020-08-17 | End: 2020-08-17

## 2020-08-17 NOTE — PROGRESS NOTES
"SUBJECTIVE:  Argenis Marcos, a 74 year old female scheduled an appointment to discuss the following issues:     Arthritis of both knees  Hyperlipidemia LDL goal <130  Hypothyroidism due to acquired atrophy of thyroid  Primary osteoarthritis of left hip      Here primarily for knee injections but will recheck surveillance labs and refill medications    Increased problems with her knees poor mobiltiy  Medical, social, surgical, and family histories reviewed.    ROS:  CONSTITUTIONAL: NEGATIVE for fever, chills  RESP: NEGATIVE for significant cough or SOB  CV: NEGATIVE for chest pain, palpitations   GI: NEGATIVE for nausea, abdominal pain, heartburn, or change in bowel habits  MUSCULOSKELETAL:joint stiffness knees bilaterally    OBJECTIVE:  /78   Pulse 86   Temp 98.4  F (36.9  C) (Oral)   Ht 1.734 m (5' 8.25\")   BMI 21.74 kg/m    EXAM:    Discussed options for treatment of knee OA including oral medication, joint injection, synvisc and surgery. Pt is failing current oral meds and would like injection today. We discussed the risks of injection, including infection of the joint, pain flare up, and not being effective.   Injection site was anesthetized with 0.5 cc of plain 1% lidocaine. In sterile fashion, the right and left anterior knees cleaned with betadine x3. Then 40mg kenalog and 4cc of plain lidocaine  was injected into right knee and left knee using the  Medial   approach.  Pt tolerated procedure well. Followup if not improving over the next couple of weeks.    Kenalog  Lot # [id number]  EXP Date 12/2020        ASSESSMENT/PLAN:  (M17.0) Arthritis of both knees  (primary encounter diagnosis)  Comment: knee injections done  Plan: acetaminophen-codeine (TYLENOL #3) 300-30 MG         tablet            (E78.5) Hyperlipidemia LDL goal <130  Comment: needs surveillance labs  Plan: Lipid panel reflex to direct LDL Non-fasting            (E03.4) Hypothyroidism due to acquired atrophy of thyroid  Comment: needs " surveillance labs  Plan: TSH WITH FREE T4 REFLEX, CBC with platelets,         Basic metabolic panel  (Ca, Cl, CO2, Creat,         Gluc, K, Na, BUN)            (M16.12) Primary osteoarthritis of left hip  Comment:   Plan: acetaminophen-codeine (TYLENOL #3) 300-30 MG         tablet        Using appropriately. Ok to refill         .

## 2020-08-17 NOTE — LETTER
August 17, 2020      Argenis Marcos  [address] [facility] [id number]        Dear ,    The results of your recent lab tests were within normal limits. Enclosed is a copy of these results.  If you have any further questions or problems, please contact our office.      Resulted Orders   CBC with platelets   Result Value Ref Range    WBC 10.4 4.0 - 11.0 10e9/L    RBC Count 4.76 3.8 - 5.2 10e12/L    Hemoglobin 14.1 11.7 - 15.7 g/dL    Hematocrit 43.5 35.0 - 47.0 %    MCV 91 78 - 100 fl    MCH 29.6 26.5 - 33.0 pg    MCHC 32.4 31.5 - 36.5 g/dL    RDW 14.4 10.0 - 15.0 %    Platelet Count 336 150 - 450 10e9/L       If you have any questions or concerns, please call the clinic at the number listed above.       Sincerely,        Obey Graves MD/jeremy

## 2020-08-17 NOTE — PATIENT INSTRUCTIONS
Orders Placed This Encounter     Lipid panel reflex to direct LDL Non-fasting     Last Lab Result: LDL Cholesterol Calculated (mg/dL)       Date                     Value                 07/22/2019               68               ----------     Order Specific Question:   Perform labs while fasting or non-fasting?     Answer:   Non-fasting     TSH WITH FREE T4 REFLEX     Last Lab Result: TSH (mU/L)       Date                     Value                 07/22/2019               0.87             ----------     CBC with platelets     Last Lab Result: Hemoglobin (g/dL)       Date                     Value                 02/07/2012               15.3             ----------     Basic metabolic panel  (Ca, Cl, CO2, Creat, Gluc, K, Na, BUN)     acetaminophen-codeine (TYLENOL #3) 300-30 MG tablet     Sig: Take 1 tablet by mouth every 8 hours as needed for severe pain And ok to take 1 extra if needed     Dispense:  100 tablet     Refill:  3

## 2020-08-17 NOTE — LETTER
August 19, 2020      Argenis Marcos  [address] [address]        Dear ,    Your labs are enclosed. Your triglycerides (fats and sugars) are elevated. Recommend avoiding carbohydrates, excessive sugars.     Please let me know if you have questions.     Resulted Orders   Lipid panel reflex to direct LDL Non-fasting   Result Value Ref Range    Cholesterol 177 <200 mg/dL    Triglycerides 189 (H) <150 mg/dL      Comment:      Borderline high:  150-199 mg/dl  High:             200-499 mg/dl  Very high:       >499 mg/dl      HDL Cholesterol 47 (L) >49 mg/dL    LDL Cholesterol Calculated 91 <100 mg/dL      Comment:      Desirable:       <100 mg/dl    Non HDL Cholesterol 128 <130 mg/dL   TSH WITH FREE T4 REFLEX   Result Value Ref Range    TSH 0.62 0.40 - 4.00 mU/L   CBC with platelets   Result Value Ref Range    WBC 10.4 4.0 - 11.0 10e9/L    RBC Count 4.76 3.8 - 5.2 10e12/L    Hemoglobin 14.1 11.7 - 15.7 g/dL    Hematocrit 43.5 35.0 - 47.0 %    MCV 91 78 - 100 fl    MCH 29.6 26.5 - 33.0 pg    MCHC 32.4 31.5 - 36.5 g/dL    RDW 14.4 10.0 - 15.0 %    Platelet Count 336 150 - 450 10e9/L   Basic metabolic panel  (Ca, Cl, CO2, Creat, Gluc, K, Na, BUN)   Result Value Ref Range    Sodium 138 133 - 144 mmol/L    Potassium 3.8 3.4 - 5.3 mmol/L    Chloride 105 94 - 109 mmol/L    Carbon Dioxide 26 20 - 32 mmol/L    Anion Gap 7 3 - 14 mmol/L    Glucose 92 70 - 99 mg/dL    Urea Nitrogen 26 7 - 30 mg/dL    Creatinine 0.92 0.52 - 1.04 mg/dL    GFR Estimate 61 >60 mL/min/[1.73_m2]      Comment:      Non  GFR Calc  Starting 12/18/2018, serum creatinine based estimated GFR (eGFR) will be   calculated using the Chronic Kidney Disease Epidemiology Collaboration   (CKD-EPI) equation.      GFR Estimate If Black 71 >60 mL/min/[1.73_m2]      Comment:       GFR Calc  Starting 12/18/2018, serum creatinine based estimated GFR (eGFR) will be   calculated using the Chronic Kidney Disease  Epidemiology Collaboration   (CKD-EPI) equation.      Calcium 9.1 8.5 - 10.1 mg/dL       If you have any questions or concerns, please call the clinic at the number listed above.       Sincerely,      MARK ANTHONY Jolly PA-C (covering for Dr. Obey Graves)/jeremy

## 2020-08-18 LAB
ANION GAP SERPL CALCULATED.3IONS-SCNC: 7 MMOL/L (ref 3–14)
BUN SERPL-MCNC: 26 MG/DL (ref 7–30)
CALCIUM SERPL-MCNC: 9.1 MG/DL (ref 8.5–10.1)
CHLORIDE SERPL-SCNC: 105 MMOL/L (ref 94–109)
CHOLEST SERPL-MCNC: 177 MG/DL
CO2 SERPL-SCNC: 26 MMOL/L (ref 20–32)
CREAT SERPL-MCNC: 0.92 MG/DL (ref 0.52–1.04)
GFR SERPL CREATININE-BSD FRML MDRD: 61 ML/MIN/{1.73_M2}
GLUCOSE SERPL-MCNC: 92 MG/DL (ref 70–99)
HDLC SERPL-MCNC: 47 MG/DL
LDLC SERPL CALC-MCNC: 91 MG/DL
NONHDLC SERPL-MCNC: 128 MG/DL
POTASSIUM SERPL-SCNC: 3.8 MMOL/L (ref 3.4–5.3)
SODIUM SERPL-SCNC: 138 MMOL/L (ref 133–144)
TRIGL SERPL-MCNC: 189 MG/DL
TSH SERPL DL<=0.005 MIU/L-ACNC: 0.62 MU/L (ref 0.4–4)

## 2020-10-14 ENCOUNTER — OFFICE VISIT (OUTPATIENT)
Dept: FAMILY MEDICINE | Facility: CLINIC | Age: 75
End: 2020-10-14
Payer: COMMERCIAL

## 2020-10-14 VITALS
HEART RATE: 89 BPM | BODY MASS INDEX: 21.28 KG/M2 | DIASTOLIC BLOOD PRESSURE: 80 MMHG | OXYGEN SATURATION: 96 % | TEMPERATURE: 98.4 F | WEIGHT: 141 LBS | SYSTOLIC BLOOD PRESSURE: 110 MMHG

## 2020-10-14 DIAGNOSIS — Z23 FLU VACCINE NEED: Primary | ICD-10-CM

## 2020-10-14 DIAGNOSIS — M17.0 PRIMARY OSTEOARTHRITIS OF BOTH KNEES: ICD-10-CM

## 2020-10-14 DIAGNOSIS — M16.12 PRIMARY OSTEOARTHRITIS OF LEFT HIP: ICD-10-CM

## 2020-10-14 DIAGNOSIS — G89.4 CHRONIC PAIN SYNDROME: ICD-10-CM

## 2020-10-14 PROBLEM — M17.12 PRIMARY OSTEOARTHRITIS OF LEFT KNEE: Status: RESOLVED | Noted: 2018-10-25 | Resolved: 2020-10-14

## 2020-10-14 PROBLEM — M25.562 LEFT KNEE PAIN: Status: RESOLVED | Noted: 2019-12-06 | Resolved: 2020-10-14

## 2020-10-14 PROCEDURE — 99213 OFFICE O/P EST LOW 20 MIN: CPT | Mod: 25 | Performed by: FAMILY MEDICINE

## 2020-10-14 PROCEDURE — G0008 ADMIN INFLUENZA VIRUS VAC: HCPCS | Performed by: FAMILY MEDICINE

## 2020-10-14 PROCEDURE — 90662 IIV NO PRSV INCREASED AG IM: CPT | Performed by: FAMILY MEDICINE

## 2020-10-14 ASSESSMENT — PAIN SCALES - GENERAL: PAINLEVEL: EXTREME PAIN (8)

## 2020-10-14 NOTE — PATIENT INSTRUCTIONS
Orders Placed This Encounter     [place] FLU VACCINE, INCREASED ANTIGEN, PRESV FREE ([id number])     acetaminophen-codeine (TYLENOL #3) 300-30 MG tablet     Sig: Take 1 tablet by mouth every 8 hours as needed for severe pain And ok to take 1 extra if needed     Dispense:  100 tablet     Refill:  1     Be careful if the combination of tizanidine and Tylenol with codeine makes you tired  .

## 2020-10-14 NOTE — PROGRESS NOTES
Subjective     Argenis Marcos is a 74 year old female who presents to clinic today for the following health issues:    HPI         Chronic Pain Follow-Up    Where in your body do you have pain? Knees  How has your pain affected your ability to work? Not applicable  Which of these pain treatments have you tried since your last clinic visit? Steroid Injections  How well are you sleeping? Good  How has your mood been since your last visit? Better  Have you had a significant life event? No  Other aggravating factors: cold/damp weather   Taking medication as directed? Yes    PHQ-9 SCORE 8/31/2015 10/31/2016 11/18/2019   PHQ-9 Total Score - - -   PHQ-9 Total Score 2 0 9     No flowsheet data found.  No flowsheet data found.  Encounter-Level CSA:    There are no encounter-level csa.     Patient-Level CSA:    There are no patient-level csa.         How many servings of fruits and vegetables do you eat daily?  2-3    On average, how many sweetened beverages do you drink each day (Examples: soda, juice, sweet tea, etc.  Do NOT count diet or artificially sweetened beverages)?   1    How many days per week do you exercise enough to make your heart beat faster? 3 or less    How many minutes a day do you exercise enough to make your heart beat faster? 9 or less    How many days per week do you miss taking your medication? 0    Patient said knee injections were helpful and wondering when she could get another. Last received 8-2020    She has scheduled an appt with Dr Palm for transfer of cares. I will review her case with Dr Palm.     Needs flu vaccine and needs meds refilled.     Review of Systems   Constitutional, HEENT, cardiovascular, pulmonary, gi and gu systems are negative, except as otherwise noted.      Objective    /80 (BP Location: Right arm, Patient Position: Sitting, Cuff Size: Adult Regular)   Pulse 89   Temp 98.4  F (36.9  C) (Oral)   Wt 64 kg (141 lb)   SpO2 96%   BMI 21.28 kg/m    Body mass index  is 21.28 kg/m .  Physical Exam   GENERAL: alert, no distress, frail and elderly  NECK: no adenopathy, no asymmetry, masses, or scars and thyroid normal to palpation  RESP: lungs clear to auscultation - no rales, rhonchi or wheezes  CV: regular rate and rhythm, normal S1 S2, no S3 or S4, no murmur, click or rub, no peripheral edema and peripheral pulses strong  ABDOMEN: soft, nontender, no hepatosplenomegaly, no masses and bowel sounds normal  MS: arthritis of the knees and hip            Assessment & Plan     Argenis was seen today for musculoskeletal problem.    Diagnoses and all orders for this visit:    Flu vaccine need  -     [place] FLU VACCINE, INCREASED ANTIGEN, PRESV FREE ([id number])    Primary osteoarthritis of both knees  -     acetaminophen-codeine (TYLENOL #3) 300-30 MG tablet; Take 1 tablet by mouth every 8 hours as needed for severe pain And ok to take 1 extra if needed    Primary osteoarthritis of left hip  -     acetaminophen-codeine (TYLENOL #3) 300-30 MG tablet; Take 1 tablet by mouth every 8 hours as needed for severe pain And ok to take 1 extra if needed    Chronic pain syndrome            Patient Instructions     Orders Placed This Encounter     [place] FLU VACCINE, INCREASED ANTIGEN, PRESV FREE ([id number])     acetaminophen-codeine (TYLENOL #3) 300-30 MG tablet     Sig: Take 1 tablet by mouth every 8 hours as needed for severe pain And ok to take 1 extra if needed     Dispense:  100 tablet     Refill:  1     Be careful if the combination of tizanidine and Tylenol with codeine makes you tired  .       Return in about 3 months (around 1/14/2021) for Review response to medications withpooja Palm.    Obey Graves MD, MD  [facility]

## 2020-10-29 ENCOUNTER — TELEPHONE (OUTPATIENT)
Dept: FAMILY MEDICINE | Facility: CLINIC | Age: 75
End: 2020-10-29

## 2020-10-29 NOTE — TELEPHONE ENCOUNTER
Routing to PCP.   Called patient and she is wanting another refill of this medication to get her though the beginning of the year.  She has a hard time getting out of the house when it is cold because of the pain.    Heidi Judge

## 2020-10-29 NOTE — TELEPHONE ENCOUNTER
TC/MA, please inform patient that 100 tablets with 1 refill sent 10/14/20 for the Tylenol #3, as requested.    Karen Covarrubias, RN, BSN, PHN  [facility]: [place]

## 2020-10-29 NOTE — TELEPHONE ENCOUNTER
My signature will not work on controlled medications after Novemver 12th.    She should set up a virtual visit with one of our providers to review if they can refill until after the first of the year. She will  Need to establish with another MD for ongoing refills.

## 2020-10-29 NOTE — TELEPHONE ENCOUNTER
Reason for Call:  Medication or medication refill:    Do you use a [facility]?  Name of the pharmacy and phone number for the current request:  [facility] [address], [facility] [phone]    Name of the medication requested:  acetaminophen-codeine (TYLENOL #3) 300-30 MG tablet    Other request:  Would like to fill this script thru winter for her arthritis.      Can we leave a detailed message on this number? YES    Phone number patient can be reached at: Cell number on file:    Telephone Information:   Mobile [phone]       Best Time:  Anytime     Call taken on 10/29/2020 at 12:57 PM by Cheri Casey

## 2020-11-13 ENCOUNTER — VIRTUAL VISIT (OUTPATIENT)
Dept: FAMILY MEDICINE | Facility: CLINIC | Age: 75
End: 2020-11-13
Payer: COMMERCIAL

## 2020-11-13 DIAGNOSIS — M16.12 PRIMARY OSTEOARTHRITIS OF LEFT HIP: ICD-10-CM

## 2020-11-13 DIAGNOSIS — M17.0 PRIMARY OSTEOARTHRITIS OF BOTH KNEES: ICD-10-CM

## 2020-11-13 DIAGNOSIS — E03.4 HYPOTHYROIDISM DUE TO ACQUIRED ATROPHY OF THYROID: ICD-10-CM

## 2020-11-13 DIAGNOSIS — E78.5 HYPERLIPIDEMIA LDL GOAL <130: ICD-10-CM

## 2020-11-13 DIAGNOSIS — G89.4 CHRONIC PAIN SYNDROME: Primary | ICD-10-CM

## 2020-11-13 PROCEDURE — 99214 OFFICE O/P EST MOD 30 MIN: CPT | Mod: TEL | Performed by: FAMILY MEDICINE

## 2020-11-13 NOTE — PROGRESS NOTES
"Argenis Marcos is a 74 year old female who is being evaluated via a billable telephone visit.      The patient has been notified of following:     \"This telephone visit will be conducted via a call between you and your physician/provider. We have found that certain health care needs can be provided without the need for a physical exam.  This service lets us provide the care you need with a short phone conversation.  If a prescription is necessary we can send it directly to your pharmacy.  If lab work is needed we can place an order for that and you can then stop by our lab to have the test done at a later time.    Telephone visits are billed at different rates depending on your insurance coverage. During this emergency period, for some insurers they may be billed the same as an in-person visit.  Please reach out to your insurance provider with any questions.    If during the course of the call the physician/provider feels a telephone visit is not appropriate, you will not be charged for this service.\"    Patient has given verbal consent for Telephone visit?  Yes    What phone number would you like to be contacted at? [phone]    How would you like to obtain your AVS? Mail a copy    Subjective     Argenis Marcos is a 74 year old female who presents via phone visit today for the following health issues:    HPI     New Patient/Transfer of Care    Patient was offered a Video Visit but she declined.   Patient was offered IN CLINIC visit but she does not have a ride available today.   Patient stated that she will come in for a Face to Face visit after the holidays but she declined setting up that visit now.     Hyperlipidemia Follow-Up      Are you regularly taking any medication or supplement to lower your cholesterol?   Yes- statin therapy    Are you having muscle aches or other side effects that you think could be caused by your cholesterol lowering medication?  No    Hypothyroidism Follow-up      Since last " visit, patient describes the following symptoms: Weight stable, no hair loss, no skin changes, no constipation, no loose stools    Chronic pain-her old pcp has been givinng her t3 every 8 hours for arthritis in hips and knee -had physical therapy and injection by pcp has not seen ortho recently   Advised that is the guildline    Migraines off and on -uses maxalt -works -mostly in the summer and winter 1-2 onset    Arthritis-hip and knee -1 every 8 hours - 3 day a day  steriod shots in august  She gets shots every 6 months, PCP used to it, she gets mild improvement but still uses about 3 T#3        Review of Systems   Constitutional, HEENT, cardiovascular, pulmonary, GI, , musculoskeletal, neuro, skin, endocrine and psych systems are negative, except as otherwise noted.       Objective          Vitals:  No vitals were obtained today due to virtual visit.    healthy, alert and no distress  PSYCH: Alert and oriented times 3; coherent speech, normal   rate and volume, able to articulate logical thoughts, able   to abstract reason, no tangential thoughts, no hallucinations   or delusions  Her affect is normal  RESP: No cough, no audible wheezing, able to talk in full sentences  Remainder of exam unable to be completed due to telephone visits    No results found for this or any previous visit (from the past 24 hour(s)).        Assessment/Plan:      ICD-10-CM    1. Chronic pain syndrome  G89.4 Orthopedic & Spine  Referral     PAIN MANAGEMENT REFERRAL   2. Hypothyroidism due to acquired atrophy of thyroid  E03.4    3. Hyperlipidemia LDL goal <130  E78.5    4. Primary osteoarthritis of both knees  M17.0 Orthopedic & Spine  Referral     PAIN MANAGEMENT REFERRAL   5. Primary osteoarthritis of left hip  M16.12 Orthopedic & Spine  Referral     PAIN MANAGEMENT REFERRAL     New patient to this provider  History of chronic pain due to arthritis , she declined knee or hip replacements in the past, has  seen ortho , reports that uyen increased her LFT then her pcp put her on T#3  And has done well she gets injections every 6 months, but only steroid injections, she does not remember any synvisc injections in the past.  Advised injection in January with ortho as planned to review treatment options for osteoarthritis,  I also wanted pain clinic to review her pain med as she wants us to continue her prescriptions. I am ok to refill until seen by specialist to review and if ok with pain clinic can take over meds as well.     Hypothyroidism-, recent labs done, cont ed    High cholesterol, cont meds      Follow up with us in 3 months        Phone call duration:  22minutes

## 2021-02-23 ENCOUNTER — TELEPHONE (OUTPATIENT)
Dept: FAMILY MEDICINE | Facility: CLINIC | Age: 76
End: 2021-02-23

## 2021-02-23 NOTE — TELEPHONE ENCOUNTER
Patient Quality Outreach      Summary:    Patient has the following on her problem list/HM:   Depression / Dysthymia review    OVERDUE       PHQ-9 SCORE 8/31/2015 10/31/2016 11/18/2019   PHQ-9 Total Score - - -   PHQ-9 Total Score 2 0 9       If PHQ-9 recheck is 5 or more, route to provider for next steps.    Patient is due/failing the following:   PHQ-9 Needed and Annual wellness, date due: 2/2013    Type of outreach:    Phone, left message for patient/parent to call back.    Questions for provider review:    None                                                                                                                                     Cande Chance MA       Chart routed to Care Team.

## 2021-02-23 NOTE — LETTER
March 4, 2021      Argenis Marcos  [address] [address]      Your healthcare team cares about your health. To provide you with the best care, we have reviewed your chart and based on our findings, we see that you are due to:     - FOLLOW UP: Schedule an OFFICE VISIT for a Mental Health Follow-up to help us address your specific concerns and determine the best health recommendations for you.    - ANNUAL WELLNESS FOLLOW UP:   Schedule an Annual Medicare Wellness Exam. This can be done by in person visit or virtual video visit.     If you have already completed these items, please contact the clinic via phone or [facility] [employer] [facility] so your care team can review and update your records.  Thank you for choosing [facility] for your healthcare needs. For any questions, concerns, or to schedule an appointment please contact the clinic.       Healthy Regards,      Your [facility] Care Team

## 2021-03-02 NOTE — TELEPHONE ENCOUNTER
Patient Quality Outreach 2nd Attempt      Summary:    Type of outreach:    Phone, left message for patient/parent to call back.    Next Steps:  Reach out within 90 days via Letter.    Max number of attempts reached: Yes. Will try again in 90 days if patient still on fail list.    Questions for provider review:    None                                                                                                                    Cande Chance MA       Chart routed to Care Team.

## 2021-03-09 DIAGNOSIS — M16.12 PRIMARY OSTEOARTHRITIS OF LEFT HIP: ICD-10-CM

## 2021-03-09 DIAGNOSIS — M17.0 PRIMARY OSTEOARTHRITIS OF BOTH KNEES: ICD-10-CM

## 2021-03-09 NOTE — TELEPHONE ENCOUNTER
Patient needs refill for acetaminophen-codeine (TYLENOL #3) 300-30 MG tablet -- she is almost out and will need this. Pt is coming in 6th of April but she can not come in any sooner.     Please call with any questions and when rx is filled.     [phone]

## 2021-03-09 NOTE — TELEPHONE ENCOUNTER
Routing to PCP- see telephone call below.    Pt is scheduled for long in clinic visit on 4/6/21.    Rx pending approval if appropriate    Kristina Pedro RN

## 2021-03-09 NOTE — TELEPHONE ENCOUNTER
See last note-...  Refilled T3 for now.     She needs to see pain clinic.        New patient to this provider  History of chronic pain due to arthritis , she declined knee or hip replacements in the past, has seen ortho , reports that voltran increased her LFT then her pcp put her on T#3  And has done well she gets injections every 6 months, but only steroid injections, she does not remember any synvisc injections in the past.  Advised injection in January with ortho as planned to review treatment options for osteoarthritis,  I also wanted pain clinic to review her pain med as she wants us to continue her prescriptions. I am ok to refill until seen by specialist to review and if ok with pain clinic can take over meds as well.      Hypothyroidism-, recent labs done, cont ed     High cholesterol, cont meds        Follow up with us in 3 months           Phone call duration:  22minutes

## 2021-03-10 NOTE — TELEPHONE ENCOUNTER
Attempt #1 to call patient.     Patient did not answer, RN left voicemail at home number. RN requested patient return call to [facility] at [phone].     When patient returns call, please inform that Rx sent for Tylenol #3 and that patient needs to be seen at pain clinic (as referred at last office visit) for further refills.    Karen Covarrubias RN, BSN, PHN  [facility]: [place]

## 2021-03-11 NOTE — TELEPHONE ENCOUNTER
Attempt #2 to call patient.      Patient did not answer, RN left voicemail at home number. RN requested patient return call to [facility] at [phone].      When patient returns call, please inform that Rx sent for Tylenol #3 and that patient needs to be seen at pain clinic (as referred at last office visit) for further refills.     Karen Covarrubias RN, BSN, PHN  [facility]: [place]

## 2021-03-11 NOTE — TELEPHONE ENCOUNTER
Patient called back, and TC relayed RN's message below.    Patient does not recall discussing a pain management referral with Dr. Palm, and is upset at the idea of seeing pain management, because her previous provider would prescribe this medication for her because she really needs it and is very careful with the way she takes it. (TC advised that there are certain guidelines and restrictions on providers prescribing controlled medications).    She states she has an appointment with her orthopedist on Monday, and she will discuss this situation with him to see if he will take over her tylenol 3 prescription, and she will call us back on Tuesday if she needs the pain management referral info.    Junior Flanagan

## 2021-03-29 ENCOUNTER — OFFICE VISIT (OUTPATIENT)
Dept: ORTHOPEDICS | Facility: CLINIC | Age: 76
End: 2021-03-29
Attending: FAMILY MEDICINE
Payer: COMMERCIAL

## 2021-03-29 ENCOUNTER — ANCILLARY PROCEDURE (OUTPATIENT)
Dept: GENERAL RADIOLOGY | Facility: CLINIC | Age: 76
End: 2021-03-29
Attending: ORTHOPAEDIC SURGERY
Payer: COMMERCIAL

## 2021-03-29 VITALS — BODY MASS INDEX: 21.28 KG/M2 | HEIGHT: 68 IN

## 2021-03-29 DIAGNOSIS — M17.0 PRIMARY OSTEOARTHRITIS OF BOTH KNEES: ICD-10-CM

## 2021-03-29 DIAGNOSIS — G89.4 CHRONIC PAIN SYNDROME: ICD-10-CM

## 2021-03-29 PROCEDURE — 73562 X-RAY EXAM OF KNEE 3: CPT | Mod: RT | Performed by: RADIOLOGY

## 2021-03-29 PROCEDURE — 99203 OFFICE O/P NEW LOW 30 MIN: CPT | Mod: 25 | Performed by: ORTHOPAEDIC SURGERY

## 2021-03-29 PROCEDURE — 20610 DRAIN/INJ JOINT/BURSA W/O US: CPT | Mod: 50 | Performed by: ORTHOPAEDIC SURGERY

## 2021-03-29 RX ADMIN — LIDOCAINE HYDROCHLORIDE 5 ML: 10 INJECTION, SOLUTION INFILTRATION; PERINEURAL at 14:21

## 2021-03-29 RX ADMIN — METHYLPREDNISOLONE ACETATE 80 MG: 80 INJECTION, SUSPENSION INTRA-ARTICULAR; INTRALESIONAL; INTRAMUSCULAR; SOFT TISSUE at 14:21

## 2021-03-29 NOTE — LETTER
3/29/2021         RE: Argenis Marcos  [address] [facility] [id number]        Dear Colleague,    Thank you for referring your patient, Argenis Marcos, to the [facility]. Please see a copy of my visit note below.    Large Joint Injection/Arthocentesis: bilateral knee    Date/Time: 3/29/2021 2:21 PM  Performed by: Elias Marsh MD  Authorized by: Elias Marsh MD     Indications:  Osteoarthritis  Needle Size:  22 G  Guidance: landmark guided    Approach:  Anteromedial  Location:  Knee  Laterality:  Bilateral      Medications (Right):  80 mg methylPREDNISolone 80 MG/ML; 5 mL lidocaine 1 %  Medications (Left):  80 mg methylPREDNISolone 80 MG/ML; 5 mL lidocaine 1 %  Outcome:  Tolerated well, no immediate complications  Procedure discussed: discussed risks, benefits, and alternatives    Consent Given by:  Patient  Timeout: timeout called immediately prior to procedure    Prep: patient was prepped and draped in usual sterile fashion            Argenis Marcos is a 75 year old female who is seen as self referral for bilateral knee pain.  She has had problems with the knees for approximately 9 years.  Is been getting gradually worse.  She recalls no history of injury.  She has dull, aching pain rated 9 out of 10 in the knees.  She has seen physical therapy.  She has also had steroid injections previously.  Her doctor retired so now is not getting the shots.  I had seen her previously for hip problem, so she has come to us for shots.  She also uses Tylenol 3.  I told her we cannot use that on a chronic basis.  Pain is worse with cold weather.  Light activity helps, but heavier activity causes pain.    X-ray both knees today shows mild medial joint spurring and narrowing, right > left.      Past Medical History:   Diagnosis Date     Cataract 12/1/2011     Depressive disorder, not elsewhere classified      Esophageal reflux      Lymphoproliferat disease       Osteoarthrosis, unspecified whether generalized or localized, unspecified site      Pure hypercholesterolemia      Thyroid disease        Past Surgical History:   Procedure Laterality Date     HC DILATION/CURETTAGE DIAG/THER NON OB  5/21/04    D & C,hysteroscopic resection of large submucosal fibroid     ZZC NONSPECIFIC PROCEDURE      URETHRAL DILATATION       Family History   Problem Relation Age of Onset     Hypertension Mother      Cancer Mother      Glaucoma Mother      Hypertension Sister      Breast Cancer Sister      Hypertension Brother      Heart Disease Father      Lipids Father      Diabetes Father      Cancer - colorectal No family hx of      Cerebrovascular Disease No family hx of      Thyroid Disease No family hx of      Macular Degeneration No family hx of        Social History     Socioeconomic History     Marital status: Single     Spouse name: Not on file     Number of children: Not on file     Years of education: Not on file     Highest education level: Not on file   Occupational History     Not on file   Social Needs     Financial resource strain: Not on file     Food insecurity     Worry: Not on file     Inability: Not on file     Transportation needs     Medical: Not on file     Non-medical: Not on file   Tobacco Use     Smoking status: Former Smoker     Smokeless tobacco: Never Used     Tobacco comment: quit in the 70's   Substance and Sexual Activity     Alcohol use: No     Drug use: No     Sexual activity: Never   Lifestyle     Physical activity     Days per week: Not on file     Minutes per session: Not on file     Stress: Not on file   Relationships     Social connections     Talks on phone: Not on file     Gets together: Not on file     Attends Anabaptism service: Not on file     Active member of club or organization: Not on file     Attends meetings of clubs or organizations: Not on file     Relationship status: Not on file     Intimate partner violence     Fear of current or ex partner:  "Not on file     Emotionally abused: Not on file     Physically abused: Not on file     Forced sexual activity: Not on file   Other Topics Concern      Service No     Blood Transfusions No     Caffeine Concern No     Occupational Exposure Yes     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 No   Social History Narrative    Caffeine intake/servings daily - 1    Calcium intake/servings daily - 1-3    Exercise 0 times weekly - describe none    Sunscreen used - Yes    Seatbelts used - Yes    Guns stored in the home - No    Self Breast Exam - Yes    Pap test up to date -  Yes and as of today    Eye exam up to date -  Yes    Dental exam up to date -  No    DEXA scan up to date -  No    Flex Sig/Colonoscopy up to date -  No    Mammography up to date -  No    Immunizations reviewed and up to date - Yes and Tdap 1/13/11    Abuse: Current or Past (Physical, Sexual or Emotional) - No    Do you feel safe in your environment - Yes    Do you cope well with stress - Most times    Do you suffer from insomnia - Yes - not taking meds for this    Last updated by: Adelina Morales  2/7/2012                           Current Outpatient Medications   Medication Sig Dispense Refill     acetaminophen-codeine (TYLENOL #3) 300-30 MG tablet Take 1 tablet by mouth every 8 hours as needed for severe pain And ok to take 1 extra if needed 100 tablet 0     ASPIRIN 81 MG OR TABS 1 tab po QD (Once per day) 100 3     CALCIUM 600 + D 600-200 MG-UNIT OR TABS 2 " tabs daily  3     clotrimazole (LOTRIMIN) 1 % vaginal cream PLACE 1 APPLICATORFUL IN THE VAGINA DAILY AND APPLY EXTERNALLY TWICE DAILY AS DIRECTED 45 g 3     clotrimazole (SM CLOTRIMAZOLE VAGINAL) 1 % vaginal cream Place 1 Applicatorful vaginally daily And apply externally bid 45 g 0     CRANBERRY 500 MG OR CAPS 1 CAPSULE 2 TIMES DAILY 90 0     EPINEPHrine (EPIPEN/ADRENACLICK/OR ANY BX GENERIC EQUIV) 0.3 MG/0.3ML injection 2-pack Inject 0.3 mLs (0.3 mg) into the muscle once as needed for anaphylaxis 0.6 mL 3     ketotifen (ZADITOR) 0.025 % ophthalmic solution Place 1 drop into both eyes 2 times daily       levothyroxine (SYNTHROID/LEVOTHROID) 75 MCG tablet TAKE 1 TABLET(75 MCG) BY MOUTH DAILY 90 tablet 3     Miconazole Nitrate 2 % POWD 1 Application daily as needed 1 Bottle 3     neomycin-polymyxin-hydrocortisone (CORTISPORIN) 3.5-14592-0 otic suspension Place 3 drops into both ears 3 times daily 10 mL 3     neomycin-polymyxin-hydrocortisone (CORTISPORIN) 3.5-95599-4 otic suspension Place 4 drops into both ears 3 times daily as needed 10 mL 3     Omega-3 Fatty Acids (FISH OIL) 1200 MG capsule Take 1 capsule by mouth daily.       polyethylene glycol 0.4%- propylene glycol 0.3% (SYSTANE ULTRA) 0.4-0.3 % SOLN ophthalmic solution Place 1 drop into both eyes as needed for dry eyes 1 Bottle 12     rizatriptan (MAXALT-MLT) 5 MG ODT Take 1-2 tablets (5-10 mg) by mouth at onset of headache for migraine May repeat dose in 2 hours.  Do not exceed 30 mg in 24 hours 18 tablet 5     simvastatin (ZOCOR) 40 MG tablet TAKE 1 TABLET(40 MG) BY MOUTH AT BEDTIME 90 tablet 3     tiZANidine (ZANAFLEX) 2 MG tablet TAKE 1 TABLET(2 MG) BY MOUTH THREE TIMES DAILY AS NEEDED FOR MUSCLE SPASMS 270 tablet 5       Allergies   Allergen Reactions     Imitrex [Sumatriptan Succinate] Shortness Of Breath     Zomig [Zolmitriptan] Shortness Of Breath     Sulfa Drugs      Sumatriptan        REVIEW OF SYSTEMS:  CONSTITUTIONAL:  NEGATIVE for fever, chills,  "change in weight, not feeling tired  SKIN:  NEGATIVE for worrisome rashes, no skin lumps, no skin ulcers and no non-healing wounds  EYES:  NEGATIVE for vision changes or irritation.  ENT/MOUTH:  NEGATIVE.  No hearing loss, no hoarseness, no difficulty swallowing.  RESP:  NEGATIVE. No cough or shortness of breath.  CV:  NEGATIVE for chest pain, palpitations or peripheral edema  GI:  NEGATIVE for nausea, abdominal pain, heartburn, or change in bowel habits  :  Negative. No dysuria, no hematuria  MUSCULOSKELETAL:  See HPI above  NEURO:  NEGATIVE . No headaches, no dizziness,  no numbness  ENDOCRINE:  NEGATIVE for temperature intolerance, skin/hair changes  HEME/ALLERGY/IMMUNE:  NEGATIVE for bleeding problems  PSYCHIATRIC:  NEGATIVE. no anxiety, no depression.      Exam:  Vitals: Ht 1.734 m (5' 8.25\")   BMI 21.28 kg/m    BMI= Body mass index is 21.28 kg/m .  Constitutional:  healthy, alert and no distress  Neuro: Alert and Oriented x 3, Sensation grossly WNL.  HEENT:  Atraumatic, EOMI  Neck:  Neck supple with no tenderness.  Psych: Affect normal   Respiratory: Breathing not labored.  Cardiovascular: normal peripheral pulses  Lymph: no adenopathy  Skin: No rashes,worrisome lesions or skin problems  Spine: straight, no straight leg raising pain.  Hips show severely decreased range of motion in both external rotation and internal rotation.  Despite this, she indicates no significant pain with hip rotation.  There is no tenderness over the sacro-iliac joints, sciatic notch, or greater trochanters.   She has tenderness over the medial aspect of both knees.  There is no ligamentous laxity of MCL, LCL, or cruciates.  She has no effusions.  She does have some patellofemoral crepitation and some pain under the patellas.  Sensation, motor and circulation are intact.    Assessment:  Bilateral knee osteoarthritis - mild.  Bilateral hip osteoarthritis - likely more severe, but no hip pain.      Plan:  She  desires injection today " of bilateral knee(s).  Risks, benefits, potential complications and alternatives were discussed.   With the patient's consent, sterile prep was performed of bilateral knee(s).  Each knee was injected with Depo Medrol 80 mg and lidocaine at anteromedial site.  Return to clinic as needed.        Again, thank you for allowing me to participate in the care of your patient.        Sincerely,        Elias Marsh MD

## 2021-03-29 NOTE — PROGRESS NOTES
Large Joint Injection/Arthocentesis: bilateral knee    Date/Time: 3/29/2021 2:21 PM  Performed by: Elias Marsh MD  Authorized by: Elias Marsh MD     Indications:  Osteoarthritis  Needle Size:  22 G  Guidance: landmark guided    Approach:  Anteromedial  Location:  Knee  Laterality:  Bilateral      Medications (Right):  80 mg methylPREDNISolone 80 MG/ML; 5 mL lidocaine 1 %  Medications (Left):  80 mg methylPREDNISolone 80 MG/ML; 5 mL lidocaine 1 %  Outcome:  Tolerated well, no immediate complications  Procedure discussed: discussed risks, benefits, and alternatives    Consent Given by:  Patient  Timeout: timeout called immediately prior to procedure    Prep: patient was prepped and draped in usual sterile fashion

## 2021-03-30 RX ORDER — METHYLPREDNISOLONE ACETATE 80 MG/ML
80 INJECTION, SUSPENSION INTRA-ARTICULAR; INTRALESIONAL; INTRAMUSCULAR; SOFT TISSUE
Status: DISCONTINUED | OUTPATIENT
Start: 2021-03-29 | End: 2023-01-01

## 2021-03-30 RX ORDER — LIDOCAINE HYDROCHLORIDE 10 MG/ML
5 INJECTION, SOLUTION INFILTRATION; PERINEURAL
Status: DISCONTINUED | OUTPATIENT
Start: 2021-03-29 | End: 2023-01-01

## 2021-03-30 NOTE — PROGRESS NOTES
Argenis Marcos is a 75 year old female who is seen as self referral for bilateral knee pain.  She has had problems with the knees for approximately 9 years.  Is been getting gradually worse.  She recalls no history of injury.  She has dull, aching pain rated 9 out of 10 in the knees.  She has seen physical therapy.  She has also had steroid injections previously.  Her doctor retired so now is not getting the shots.  I had seen her previously for hip problem, so she has come to us for shots.  She also uses Tylenol 3.  I told her we cannot use that on a chronic basis.  Pain is worse with cold weather.  Light activity helps, but heavier activity causes pain.    X-ray both knees today shows mild medial joint spurring and narrowing, right > left.      Past Medical History:   Diagnosis Date     Cataract 12/1/2011     Depressive disorder, not elsewhere classified      Esophageal reflux      Lymphoproliferat disease      Osteoarthrosis, unspecified whether generalized or localized, unspecified site      Pure hypercholesterolemia      Thyroid disease        Past Surgical History:   Procedure Laterality Date     HC DILATION/CURETTAGE DIAG/THER NON OB  5/21/04    D & C,hysteroscopic resection of large submucosal fibroid     ZZC NONSPECIFIC PROCEDURE      URETHRAL DILATATION       Family History   Problem Relation Age of Onset     Hypertension Mother      Cancer Mother      Glaucoma Mother      Hypertension Sister      Breast Cancer Sister      Hypertension Brother      Heart Disease Father      Lipids Father      Diabetes Father      Cancer - colorectal No family hx of      Cerebrovascular Disease No family hx of      Thyroid Disease No family hx of      Macular Degeneration No family hx of        Social History     Socioeconomic History     Marital status: Single     Spouse name: Not on file     Number of children: Not on file     Years of education: Not on file     Highest education level: Not on file   Occupational History  "    Not on file   Social Needs     Financial resource strain: Not on file     Food insecurity     Worry: Not on file     Inability: Not on file     Transportation needs     Medical: Not on file     Non-medical: Not on file   Tobacco Use     Smoking status: Former Smoker     Smokeless tobacco: Never Used     Tobacco comment: quit in the 70's   Substance and Sexual Activity     Alcohol use: No     Drug use: No     Sexual activity: Never   Lifestyle     Physical activity     Days per week: Not on file     Minutes per session: Not on file     Stress: Not on file   Relationships     Social connections     Talks on phone: Not on file     Gets together: Not on file     Attends Restorationism service: Not on file     Active member of club or organization: Not on file     Attends meetings of clubs or organizations: Not on file     Relationship status: Not on file     Intimate partner violence     Fear of current or ex partner: Not on file     Emotionally abused: Not on file     Physically abused: Not on file     Forced sexual activity: Not on file   Other Topics Concern      Service No     Blood Transfusions No     Caffeine Concern No     Occupational Exposure Yes     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 No   Social History Narrative    Caffeine intake/servings daily - 1    Calcium intake/servings daily - 1-3    Exercise 0 times weekly - describe none    Sunscreen used - Yes    Seatbelts used - Yes    Guns stored in the home " - No    Self Breast Exam - Yes    Pap test up to date -  Yes and as of today    Eye exam up to date -  Yes    Dental exam up to date -  No    DEXA scan up to date -  No    Flex Sig/Colonoscopy up to date -  No    Mammography up to date -  No    Immunizations reviewed and up to date - Yes and Tdap 1/13/11    Abuse: Current or Past (Physical, Sexual or Emotional) - No    Do you feel safe in your environment - Yes    Do you cope well with stress - Most times    Do you suffer from insomnia - Yes - not taking meds for this    Last updated by: Adelina Morales  2/7/2012                           Current Outpatient Medications   Medication Sig Dispense Refill     acetaminophen-codeine (TYLENOL #3) 300-30 MG tablet Take 1 tablet by mouth every 8 hours as needed for severe pain And ok to take 1 extra if needed 100 tablet 0     ASPIRIN 81 MG OR TABS 1 tab po QD (Once per day) 100 3     CALCIUM 600 + D 600-200 MG-UNIT OR TABS 2 tabs daily  3     clotrimazole (LOTRIMIN) 1 % vaginal cream PLACE 1 APPLICATORFUL IN THE VAGINA DAILY AND APPLY EXTERNALLY TWICE DAILY AS DIRECTED 45 g 3     clotrimazole (SM CLOTRIMAZOLE VAGINAL) 1 % vaginal cream Place 1 Applicatorful vaginally daily And apply externally bid 45 g 0     CRANBERRY 500 MG OR CAPS 1 CAPSULE 2 TIMES DAILY 90 0     EPINEPHrine (EPIPEN/ADRENACLICK/OR ANY BX GENERIC EQUIV) 0.3 MG/0.3ML injection 2-pack Inject 0.3 mLs (0.3 mg) into the muscle once as needed for anaphylaxis 0.6 mL 3     ketotifen (ZADITOR) 0.025 % ophthalmic solution Place 1 drop into both eyes 2 times daily       levothyroxine (SYNTHROID/LEVOTHROID) 75 MCG tablet TAKE 1 TABLET(75 MCG) BY MOUTH DAILY 90 tablet 3     Miconazole Nitrate 2 % POWD 1 Application daily as needed 1 Bottle 3     neomycin-polymyxin-hydrocortisone (CORTISPORIN) 3.5-35174-9 otic suspension Place 3 drops into both ears 3 times daily 10 mL 3     neomycin-polymyxin-hydrocortisone (CORTISPORIN) 3.5-54733-7 otic suspension Place 4 drops into both  "ears 3 times daily as needed 10 mL 3     Omega-3 Fatty Acids (FISH OIL) 1200 MG capsule Take 1 capsule by mouth daily.       polyethylene glycol 0.4%- propylene glycol 0.3% (SYSTANE ULTRA) 0.4-0.3 % SOLN ophthalmic solution Place 1 drop into both eyes as needed for dry eyes 1 Bottle 12     rizatriptan (MAXALT-MLT) 5 MG ODT Take 1-2 tablets (5-10 mg) by mouth at onset of headache for migraine May repeat dose in 2 hours.  Do not exceed 30 mg in 24 hours 18 tablet 5     simvastatin (ZOCOR) 40 MG tablet TAKE 1 TABLET(40 MG) BY MOUTH AT BEDTIME 90 tablet 3     tiZANidine (ZANAFLEX) 2 MG tablet TAKE 1 TABLET(2 MG) BY MOUTH THREE TIMES DAILY AS NEEDED FOR MUSCLE SPASMS 270 tablet 5       Allergies   Allergen Reactions     Imitrex [Sumatriptan Succinate] Shortness Of Breath     Zomig [Zolmitriptan] Shortness Of Breath     Sulfa Drugs      Sumatriptan        REVIEW OF SYSTEMS:  CONSTITUTIONAL:  NEGATIVE for fever, chills, change in weight, not feeling tired  SKIN:  NEGATIVE for worrisome rashes, no skin lumps, no skin ulcers and no non-healing wounds  EYES:  NEGATIVE for vision changes or irritation.  ENT/MOUTH:  NEGATIVE.  No hearing loss, no hoarseness, no difficulty swallowing.  RESP:  NEGATIVE. No cough or shortness of breath.  CV:  NEGATIVE for chest pain, palpitations or peripheral edema  GI:  NEGATIVE for nausea, abdominal pain, heartburn, or change in bowel habits  :  Negative. No dysuria, no hematuria  MUSCULOSKELETAL:  See HPI above  NEURO:  NEGATIVE . No headaches, no dizziness,  no numbness  ENDOCRINE:  NEGATIVE for temperature intolerance, skin/hair changes  HEME/ALLERGY/IMMUNE:  NEGATIVE for bleeding problems  PSYCHIATRIC:  NEGATIVE. no anxiety, no depression.      Exam:  Vitals: Ht 1.734 m (5' 8.25\")   BMI 21.28 kg/m    BMI= Body mass index is 21.28 kg/m .  Constitutional:  healthy, alert and no distress  Neuro: Alert and Oriented x 3, Sensation grossly WNL.  HEENT:  Atraumatic, EOMI  Neck:  Neck supple " with no tenderness.  Psych: Affect normal   Respiratory: Breathing not labored.  Cardiovascular: normal peripheral pulses  Lymph: no adenopathy  Skin: No rashes,worrisome lesions or skin problems  Spine: straight, no straight leg raising pain.  Hips show severely decreased range of motion in both external rotation and internal rotation.  Despite this, she indicates no significant pain with hip rotation.  There is no tenderness over the sacro-iliac joints, sciatic notch, or greater trochanters.   She has tenderness over the medial aspect of both knees.  There is no ligamentous laxity of MCL, LCL, or cruciates.  She has no effusions.  She does have some patellofemoral crepitation and some pain under the patellas.  Sensation, motor and circulation are intact.    Assessment:  Bilateral knee osteoarthritis - mild.  Bilateral hip osteoarthritis - likely more severe, but no hip pain.      Plan:  She  desires injection today of bilateral knee(s).  Risks, benefits, potential complications and alternatives were discussed.   With the patient's consent, sterile prep was performed of bilateral knee(s).  Each knee was injected with Depo Medrol 80 mg and lidocaine at anteromedial site.  Return to clinic as needed.

## 2021-04-13 ENCOUNTER — TELEPHONE (OUTPATIENT)
Dept: FAMILY MEDICINE | Facility: CLINIC | Age: 76
End: 2021-04-13

## 2021-04-13 ENCOUNTER — TELEPHONE (OUTPATIENT)
Dept: FAMILY MEDICINE | Facility: CLINIC | Age: 76
End: 2021-04-13
Payer: COMMERCIAL

## 2021-04-13 NOTE — TELEPHONE ENCOUNTER
No answer, left yris to return call to [facility] at [phone] to discuss Dr. Palm's message below regarding upcoming clinic visit.    Kristina Pedro RN

## 2021-04-13 NOTE — TELEPHONE ENCOUNTER
Reviewed chart, I had met aileen one time and advised pain clinic for her chronic pain meds and has not seen then and has an upcoming visit. It seems like she was reminded again and has not done so. Please forewarn patient that I again am not comfortable with  narcotics long term for her chronic knee pain and needs to have ortho or pain clinic manage it.  I am happy to see her for anything else.  Thanks  Dl Palm D.O.

## 2021-04-13 NOTE — TELEPHONE ENCOUNTER
Reason for call:  Other   Patient called regarding (reason for call): call back  Additional comments: Wanted PCP to knwo she is not going to any pain clinic. Deals with too many providers's already. Stated the Tylenol 3 works and isn't sure why she needs to see the pain clinic. Advised she may see another provider.    Phone number to reach patient:  Home number on file [phone] (home)    Best Time:  Anytime    Can we leave a detailed message on this number?  YES     Travel screening: Not Applicable

## 2021-04-14 NOTE — TELEPHONE ENCOUNTER
RN called patient and relayed provider's message. Patient upset, states that Dr. Graves would give her Tylenol #3 because that is the only thing that works for her pain.     Per ortho note on 3/29/21 - also advised by ortho that narcotic medication is not appropriate for chronic use for her knee pain.     Patient states she will not go to pain clinic, thinks it would be a waste of time.     Patient would still like to see Dr. Palm for the rest of her medical care. Requests to keep scheduled appointment for other medication check/follow up (levothyroxine).     RN again encouraged her to schedule with pain management. Patient unsure, will think about it.    Routing to PCP as CHRISTIN.     Karen Covarrubias, RN, BSN, PHN  [facility]

## 2021-04-15 ENCOUNTER — THERAPY VISIT (OUTPATIENT)
Dept: PHYSICAL THERAPY | Facility: CLINIC | Age: 76
End: 2021-04-15
Payer: COMMERCIAL

## 2021-04-15 DIAGNOSIS — M25.561 PAIN IN BOTH KNEES: ICD-10-CM

## 2021-04-15 DIAGNOSIS — M25.562 PAIN IN BOTH KNEES: ICD-10-CM

## 2021-04-15 PROCEDURE — 97110 THERAPEUTIC EXERCISES: CPT | Mod: GP | Performed by: PHYSICAL THERAPIST

## 2021-04-15 PROCEDURE — 97112 NEUROMUSCULAR REEDUCATION: CPT | Mod: GP | Performed by: PHYSICAL THERAPIST

## 2021-04-15 PROCEDURE — 97161 PT EVAL LOW COMPLEX 20 MIN: CPT | Mod: GP | Performed by: PHYSICAL THERAPIST

## 2021-04-15 ASSESSMENT — ACTIVITIES OF DAILY LIVING (ADL)
LIMPING: I DO NOT HAVE THE SYMPTOM
WEAKNESS: I DO NOT HAVE THE SYMPTOM
GO DOWN STAIRS: ACTIVITY IS SOMEWHAT DIFFICULT
HOW_WOULD_YOU_RATE_THE_OVERALL_FUNCTION_OF_YOUR_KNEE_DURING_YOUR_USUAL_DAILY_ACTIVITIES?: NEARLY NORMAL
GO UP STAIRS: ACTIVITY IS SOMEWHAT DIFFICULT
SWELLING: I DO NOT HAVE THE SYMPTOM
KNEE_ACTIVITY_OF_DAILY_LIVING_SCORE: 70
RAW_SCORE: 49
SQUAT: I AM UNABLE TO DO THE ACTIVITY
STAND: ACTIVITY IS NOT DIFFICULT
PAIN: THE SYMPTOM AFFECTS MY ACTIVITY SLIGHTLY
WALK: ACTIVITY IS MINIMALLY DIFFICULT
HOW_WOULD_YOU_RATE_THE_CURRENT_FUNCTION_OF_YOUR_KNEE_DURING_YOUR_USUAL_DAILY_ACTIVITIES_ON_A_SCALE_FROM_0_TO_100_WITH_100_BEING_YOUR_LEVEL_OF_KNEE_FUNCTION_PRIOR_TO_YOUR_INJURY_AND_0_BEING_THE_INABILITY_TO_PERFORM_ANY_OF_YOUR_USUAL_DAILY_ACTIVITIES?: 75
SIT WITH YOUR KNEE BENT: ACTIVITY IS SOMEWHAT DIFFICULT
RISE FROM A CHAIR: ACTIVITY IS NOT DIFFICULT
KNEEL ON THE FRONT OF YOUR KNEE: I AM UNABLE TO DO THE ACTIVITY
AS_A_RESULT_OF_YOUR_KNEE_INJURY,_HOW_WOULD_YOU_RATE_YOUR_CURRENT_LEVEL_OF_DAILY_ACTIVITY?: NEARLY NORMAL
STIFFNESS: THE SYMPTOM AFFECTS MY ACTIVITY SLIGHTLY
KNEE_ACTIVITY_OF_DAILY_LIVING_SUM: 49
GIVING WAY, BUCKLING OR SHIFTING OF KNEE: I DO NOT HAVE THE SYMPTOM

## 2021-04-20 ENCOUNTER — TELEPHONE (OUTPATIENT)
Dept: ORTHOPEDICS | Facility: CLINIC | Age: 76
End: 2021-04-20

## 2021-04-20 NOTE — TELEPHONE ENCOUNTER
Reason for Call:  Other     Detailed comments: Patient is calling in today wondering if she can get a copy of her x-rays and what was found on them? Patient stated that she had a reaction to the injection she had in her right knee.  Patient stated that she has been having a lot of pain on and off. Thank you    Phone Number Patient can be reached at: Home number on file [phone] (home)    Best Time: anytime    Can we leave a detailed message on this number? YES    Call taken on 4/20/2021 at 3:37 PM by Brittany Hartley

## 2021-04-21 NOTE — TELEPHONE ENCOUNTER
Called patient and reviewed knee x-ray results. She is asking if the report can be faxed to her. Patient reports she cannot sleep due to left knee and hip pain. She is looking to get refills of Tylenol #3. RN advised patient to follow up with her PCP.    MA: ortho MA, please mail results to patient. She is also inquiring as to whether she can get images of x-ray Please advise if this is possible.    Grey WASHINGTON RN....4/21/2021 4:20 PM

## 2021-04-21 NOTE — TELEPHONE ENCOUNTER
[facility]    Phone Message    May a detailed message be left on voicemail: yes     Reason for Call: Other: Patient has not yet heard back on her call from yesterday, Patient has not yet received the results of her xrays and having alot of pain after cortisone injection. Would appreciate a call back.       Action Taken: Message routed to:  Other: [place]     Travel Screening: Not Applicable

## 2021-05-19 ENCOUNTER — VIRTUAL VISIT (OUTPATIENT)
Dept: FAMILY MEDICINE | Facility: CLINIC | Age: 76
End: 2021-05-19
Payer: COMMERCIAL

## 2021-05-19 DIAGNOSIS — M16.12 PRIMARY OSTEOARTHRITIS OF LEFT HIP: ICD-10-CM

## 2021-05-19 DIAGNOSIS — M17.0 PRIMARY OSTEOARTHRITIS OF BOTH KNEES: Primary | ICD-10-CM

## 2021-05-19 DIAGNOSIS — Z91.030 ALLERGY TO BEE STING: ICD-10-CM

## 2021-05-19 DIAGNOSIS — G43.109 MIGRAINE WITH AURA AND WITHOUT STATUS MIGRAINOSUS, NOT INTRACTABLE: ICD-10-CM

## 2021-05-19 PROCEDURE — 99214 OFFICE O/P EST MOD 30 MIN: CPT | Mod: TEL | Performed by: FAMILY MEDICINE

## 2021-05-19 RX ORDER — EPINEPHRINE 0.3 MG/.3ML
0.3 INJECTION SUBCUTANEOUS
Qty: 0.6 ML | Refills: 3 | Status: SHIPPED | OUTPATIENT
Start: 2021-05-19

## 2021-05-19 RX ORDER — RIZATRIPTAN BENZOATE 5 MG/1
5-10 TABLET, ORALLY DISINTEGRATING ORAL
Qty: 18 TABLET | Refills: 5 | Status: SHIPPED | OUTPATIENT
Start: 2021-05-19 | End: 2023-01-01

## 2021-05-19 RX ORDER — DICLOFENAC SODIUM 75 MG/1
75 TABLET, DELAYED RELEASE ORAL 2 TIMES DAILY
Qty: 60 TABLET | Refills: 0 | Status: SHIPPED | OUTPATIENT
Start: 2021-05-19 | End: 2021-06-11

## 2021-05-19 ASSESSMENT — ANXIETY QUESTIONNAIRES
7. FEELING AFRAID AS IF SOMETHING AWFUL MIGHT HAPPEN: NOT AT ALL
IF YOU CHECKED OFF ANY PROBLEMS ON THIS QUESTIONNAIRE, HOW DIFFICULT HAVE THESE PROBLEMS MADE IT FOR YOU TO DO YOUR WORK, TAKE CARE OF THINGS AT HOME, OR GET ALONG WITH OTHER PEOPLE: NOT DIFFICULT AT ALL
6. BECOMING EASILY ANNOYED OR IRRITABLE: NOT AT ALL
3. WORRYING TOO MUCH ABOUT DIFFERENT THINGS: SEVERAL DAYS
GAD7 TOTAL SCORE: 1
1. FEELING NERVOUS, ANXIOUS, OR ON EDGE: NOT AT ALL
5. BEING SO RESTLESS THAT IT IS HARD TO SIT STILL: NOT AT ALL
2. NOT BEING ABLE TO STOP OR CONTROL WORRYING: NOT AT ALL

## 2021-05-19 ASSESSMENT — PATIENT HEALTH QUESTIONNAIRE - PHQ9
5. POOR APPETITE OR OVEREATING: NOT AT ALL
SUM OF ALL RESPONSES TO PHQ QUESTIONS 1-9: 3

## 2021-05-19 NOTE — Clinical Note
Dr. Marsh.  Thanks for seeing this patient back in March.  Trying to find some pain relief for her without using the T3 that she was previously prescribed.  Do you think she's a good candidate for hyaluronic acid injections?

## 2021-05-19 NOTE — PROGRESS NOTES
Argenis is a 75 year old who is being evaluated via a billable telephone visit.      What phone number would you like to be contacted at? [phone]  How would you like to obtain your AVS?     ========================================================================    Assessment & Plan     Primary osteoarthritis of both knees  Primary osteoarthritis of left hip  - Discussed with her today that narcotics are not recommended for chronic use so I will not refill her T3  - Previously had good pain control with knee Kenalog injections, but unfortunately her last injection in March wasn't as effective  - Will see if Dr. Marsh thinks she's a good candidate for hyaluronic acid injections  - There is documentation from years ago that Voltaren worked well for her so will re-start that   - Checked last metabolic panel and there are no abnormalities in kidney or liver function   - diclofenac (VOLTAREN) 75 MG EC tablet; Take 1 tablet (75 mg) by mouth 2 times daily    Allergy to bee sting  - EPINEPHrine (ANY BX GENERIC EQUIV) 0.3 MG/0.3ML injection 2-pack; Inject 0.3 mLs (0.3 mg) into the muscle once as needed for anaphylaxis    Migraine with aura and without status migrainosus, not intractable  - Stable, just needs refills   - rizatriptan (MAXALT-MLT) 5 MG ODT; Take 1-2 tablets (5-10 mg) by mouth at onset of headache for migraine May repeat dose in 2 hours.  Do not exceed 30 mg in 24 hours    Return in about 4 weeks (around 6/16/2021) for Pain follow up .    Ida Lafleur DO  [facility]  =====================================================================    Subjective   Argenis is a 75 year old who presents for the following health issues       HPI     Chronic Pain Follow-Up    Where in your body do you have pain? Arthritis in hips, knees, bilaterally  How has your pain affected your ability to work? Not applicable  Which of these pain treatments have you tried since your last clinic visit?  Other: .patient stated that she tried multiple treatment and nothing works, cannot sleep and the pain keeps her awake. Patient would like to get a refill on T3  How well are you sleeping? Poor  How has your mood been since your last visit? About the same  Have you had a significant life event? No  Other aggravating factors: none  Taking medication as directed? Yes    This is a previous patient of Dr. Graves, who has since retired.  She has been taking T3 chronically since about 2013 for her knee and hip arthritis.  Recently established with another provider at our clinic who was not willing to continue this Rx for her.  She also met with her orthopedist (Dr. Marsh) recently who recommended avoiding chronic narcotic use.      She has tried the following other therapies:  - Voltaren 75 mg BID (she can't remember taking this one, but it's documented in previous notes)  - Naproxen (not effective)  - Ibuprofen (not effective)  - Flexeril   - Tizanidine   - Physical therapy started recently   - Knee injections (steroid) in April 2019, July 2019, November 2019, August 2020, March 2021.  Patient says that knee injections used to work for about 4 months.  She doesn't feel that her last shot with Dr. Marsh in March was helpful.       checked.  She gets 100 tablets of T3 monthly.  Last Rx was filled on 3/20/21.  She previously took them three times a day.  Now that she's been out she notes worsening pain and having a very hard time sleeping.  She uses a walker or cane for ambulation.      Patient states that Dr. Marsh doesn't think she needs surgery.  She has never had hyaluronic acid injections before.        PHQ-9 SCORE 10/31/2016 11/18/2019 5/19/2021   PHQ-9 Total Score - - -   PHQ-9 Total Score 0 9 3     CEASAR-7 SCORE 5/19/2021   Total Score 1     No flowsheet data found.  Encounter-Level CSA:    There are no encounter-level csa.     Patient-Level CSA:    There are no patient-level csa.        Patient also needs  refills of her Epi Pen for a bee allergy.      She also needs refills on her Maxalt.  She gets migraines very infrequently.      Review of Systems   Constitutional, HEENT, cardiovascular, pulmonary, gi and gu systems are negative, except as otherwise noted.      Objective       Vitals:  No vitals were obtained today due to virtual visit.    Physical Exam   healthy, alert and no distress  PSYCH: Alert and oriented times 3; coherent speech, normal   rate and volume, able to articulate logical thoughts, able   to abstract reason, no tangential thoughts, no hallucinations   or delusions  Her affect is normal  RESP: No cough, no audible wheezing, able to talk in full sentences  Remainder of exam unable to be completed due to telephone visits    KNEE THREE VIEWS BILATERAL  3/29/2021 3:19 PM      HISTORY: Primary osteoarthritis of both knees     COMPARISON: 9/18/2017                                                                      IMPRESSION:      Right knee: No acute fracture or malalignment. Moderate medial and  patellofemoral compartment degenerative changes. No knee joint  effusion. Osteopenia. Atherosclerosis.     Left knee: No acute fracture or malalignment. Mild medial and  patellofemoral compartment degenerative changes. No knee joint  effusion. Osteopenia. Atherosclerosis.     MELANIA HURLEY MD          Phone call duration: 21 minutes

## 2021-05-20 ASSESSMENT — ANXIETY QUESTIONNAIRES: GAD7 TOTAL SCORE: 1

## 2021-06-03 ENCOUNTER — NURSE TRIAGE (OUTPATIENT)
Dept: NURSING | Facility: CLINIC | Age: 76
End: 2021-06-03

## 2021-06-03 DIAGNOSIS — N89.8 VAGINAL ITCHING: ICD-10-CM

## 2021-06-04 NOTE — TELEPHONE ENCOUNTER
Routing to PCP- see phone message     Routing refill request to provider for review/approval because:  Drug not on the [facility] refill protocol     Rx pending approval.    Kristina Pedro RN

## 2021-06-04 NOTE — TELEPHONE ENCOUNTER
Patient calling to check on status of this refill. Patient only has a small amount of cream left and is hoping to get this refilled today.    YOUSUF Gomez  [facility]

## 2021-06-07 RX ORDER — CLOTRIMAZOLE 1 %
CREAM WITH APPLICATOR VAGINAL
Qty: 45 G | Refills: 3 | Status: SHIPPED | OUTPATIENT
Start: 2021-06-07

## 2021-06-07 NOTE — TELEPHONE ENCOUNTER
She recent had an appoint with Dr Lafleur and has established care, will forward to her as she also has an upcoming visit with her. .   Dl Palm D.O.

## 2021-06-08 ENCOUNTER — TELEPHONE (OUTPATIENT)
Dept: FAMILY MEDICINE | Facility: CLINIC | Age: 76
End: 2021-06-08

## 2021-06-08 NOTE — TELEPHONE ENCOUNTER
RN spoke with Pharmacist at [facility] pharmacy    [facility] stated they had not received the electronic prescription for the vaginal cream.    RN gave verbal order for the Lotrimin cream dispense 45 grams with 3 refills    Jn Diallo RN, BSN, PHN  [facility]

## 2021-06-08 NOTE — TELEPHONE ENCOUNTER
Patient calling regarding vaginal cream, wondering why it was discontinued.  Informed her that a new prescription was sent to her pharmacy on 6/7/21 and she should call and speak directly to a pharmacist to dispense prescription.    Patient asked for a message to be sent to PCP- wanted her to know that voltaren gel was working wonders for arthritis pain.        Renata Ramirez RN

## 2021-06-11 DIAGNOSIS — M17.0 PRIMARY OSTEOARTHRITIS OF BOTH KNEES: ICD-10-CM

## 2021-06-11 DIAGNOSIS — M16.12 PRIMARY OSTEOARTHRITIS OF LEFT HIP: ICD-10-CM

## 2021-06-11 RX ORDER — DICLOFENAC SODIUM 75 MG/1
75 TABLET, DELAYED RELEASE ORAL 2 TIMES DAILY
Qty: 180 TABLET | Refills: 0 | Status: SHIPPED | OUTPATIENT
Start: 2021-06-11 | End: 2021-09-17

## 2021-06-11 NOTE — TELEPHONE ENCOUNTER
Routing to PCP- see [facility]    Pt requesting 90 day supply    Rx pending approval    Kristina Pedro RN

## 2021-06-11 NOTE — TELEPHONE ENCOUNTER
Reason for Call:  Medication or medication refill:    Do you use a [facility]?  Name of the pharmacy and phone number for the current request :  [facility]     Name of the medication requested: Voltaren 75mg; Patient states when she picked up her last RX it was for 60 not 90 tablets.     Other request: none    Can we leave a detailed message on this number? YES    Phone number patient can be reached at: Home number on file [phone]    Best Time: anytime    Call taken on 6/11/2021 at 1:15 PM by Belem Pedroza

## 2021-06-21 DIAGNOSIS — E03.4 HYPOTHYROIDISM DUE TO ACQUIRED ATROPHY OF THYROID: ICD-10-CM

## 2021-06-22 RX ORDER — LEVOTHYROXINE SODIUM 75 UG/1
TABLET ORAL
Qty: 90 TABLET | Refills: 0 | Status: SHIPPED | OUTPATIENT
Start: 2021-06-22 | End: 2021-10-04

## 2021-06-22 NOTE — TELEPHONE ENCOUNTER
Routing refill request to provider for review/approval because:  Was previously prescribed by Dr. Graves, who retired.          Pending Prescriptions:                       Disp   Refills    levothyroxine (SYNTHROID/LEVOTHROID) 75 MC*90 tab*3        Sig: TAKE 1 TABLET(75 MCG) BY MOUTH DAILY        Kian Hsieh RN

## 2021-08-18 ENCOUNTER — TELEPHONE (OUTPATIENT)
Dept: ORTHOPEDICS | Facility: CLINIC | Age: 76
End: 2021-08-18

## 2021-08-18 NOTE — TELEPHONE ENCOUNTER
Reason for Call:  Other call back    Detailed comments: pt called has pain in right leg up on top.  Very painful.  Pt would like to know what she can do for that.  Call and discuss    Phone Number Patient can be reached at: Cell number on file:    Telephone Information:   Mobile [phone]       Best Time: any    Can we leave a detailed message on this number? YES    Call taken on 8/18/2021 at 4:12 PM by Renetta Simon

## 2021-08-19 NOTE — TELEPHONE ENCOUNTER
Returned call to pt and left vm to call back.    Ary SWANSON RN Specialty Triage 8/19/2021 9:06 AM

## 2021-08-19 NOTE — TELEPHONE ENCOUNTER
Pt returned call    RLE painful with standing- starts at hip and goes down to knee.   Pain severity (on a scale on 1-10, 10 being the most):10/10  Frequency of symptoms (consistent or inconsistent): inconsistent  Exacerbating Factors (things that make it worse): standing moving  Relieving Factors (things that make it better): rest  Night Pain: no  Pain while at rest:  no  Numbness or tingling:  no  Patient has tried:     NSAIDS (ex- ibuprofen, aleve, motrin):  diclofenac     Physical Therapy: yes     Activity modification:  yes     Bracing:  no     Injections: no     Ice:  yes     Assistive device: rachel SWANSON RN Specialty Triage 8/19/2021 1:58 PM

## 2021-08-20 NOTE — TELEPHONE ENCOUNTER
I have attempted to contact Argenis Marcos on 8/20/2021 at 9:35 AM by phone to return their call or discuss lab results, but there is no response.  message left.  We could see her in clinic to assess hip or knee.  If it is back related, she will need to see primary physician for appropriate treatment and referral.

## 2021-09-13 ENCOUNTER — ANCILLARY PROCEDURE (OUTPATIENT)
Dept: GENERAL RADIOLOGY | Facility: CLINIC | Age: 76
End: 2021-09-13
Attending: ORTHOPAEDIC SURGERY
Payer: COMMERCIAL

## 2021-09-13 ENCOUNTER — OFFICE VISIT (OUTPATIENT)
Dept: ORTHOPEDICS | Facility: CLINIC | Age: 76
End: 2021-09-13
Payer: COMMERCIAL

## 2021-09-13 VITALS
WEIGHT: 153.2 LBS | HEART RATE: 91 BPM | RESPIRATION RATE: 16 BRPM | DIASTOLIC BLOOD PRESSURE: 102 MMHG | HEIGHT: 68 IN | BODY MASS INDEX: 23.22 KG/M2 | SYSTOLIC BLOOD PRESSURE: 143 MMHG

## 2021-09-13 DIAGNOSIS — M25.551 RIGHT HIP PAIN: ICD-10-CM

## 2021-09-13 DIAGNOSIS — M25.551 RIGHT HIP PAIN: Primary | ICD-10-CM

## 2021-09-13 DIAGNOSIS — M16.0 PRIMARY OSTEOARTHRITIS OF BOTH HIPS: ICD-10-CM

## 2021-09-13 PROCEDURE — 99214 OFFICE O/P EST MOD 30 MIN: CPT | Performed by: ORTHOPAEDIC SURGERY

## 2021-09-13 PROCEDURE — 73502 X-RAY EXAM HIP UNI 2-3 VIEWS: CPT | Mod: RT | Performed by: RADIOLOGY

## 2021-09-13 ASSESSMENT — MIFFLIN-ST. JEOR: SCORE: 1247.38

## 2021-09-13 ASSESSMENT — PAIN SCALES - GENERAL: PAINLEVEL: NO PAIN (0)

## 2021-09-13 NOTE — LETTER
9/13/2021         RE: Argenis Marcos  [address] [id number]        Dear Colleague,    Thank you for referring your patient, Argenis Marcos, to the [facility]. Please see a copy of my visit note below.    Argenis Marcos is a 75 year old female who is seen as self referral for right hip pain.  She has had problems with both hips for many years.  X-ray of the left hip in 2012 was very much bone-on-bone.  She uses a walker to walk all of the time.  She has now had increased pain over the last 3 weeks.  She does some pedaling exercises with a small machine at home and thinks she overstretched.  She has shooting pain in the posterior thigh.  It hurts to sit on this.  Some of the pain is migrated around to the groin.    X-ray shows severe osteoarthritis of both hips with bone erosion superiorly.  Much of the socket is filled actually with bone spur below the femoral heads.    Past Medical History:   Diagnosis Date     Cataract 12/1/2011     Depressive disorder, not elsewhere classified      Esophageal reflux      Lymphoproliferat disease      Osteoarthrosis, unspecified whether generalized or localized, unspecified site      Pure hypercholesterolemia      Thyroid disease        Past Surgical History:   Procedure Laterality Date     HC DILATION/CURETTAGE DIAG/THER NON OB  5/21/04    D & C,hysteroscopic resection of large submucosal fibroid     ZZC NONSPECIFIC PROCEDURE      URETHRAL DILATATION       Family History   Problem Relation Age of Onset     Hypertension Mother      Cancer Mother      Glaucoma Mother      Hypertension Sister      Breast Cancer Sister      Hypertension Brother      Heart Disease Father      Lipids Father      Diabetes Father      Cancer - colorectal No family hx of      Cerebrovascular Disease No family hx of      Thyroid Disease No family hx of      Macular Degeneration No family hx of        Social History     Socioeconomic History     Marital  "status: Single     Spouse name: Not on file     Number of children: Not on file     Years of education: Not on file     Highest education level: Not on file   Occupational History     Not on file   Tobacco Use     Smoking status: Former Smoker     Smokeless tobacco: Never Used     Tobacco comment: quit in the 70's   Substance and Sexual Activity     Alcohol use: No     Drug use: No     Sexual activity: Never   Other Topics Concern      Service No     Blood Transfusions No     Caffeine Concern No     Occupational Exposure Yes     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 No   Social History Narrative    Caffeine intake/servings daily - 1    Calcium intake/servings daily - 1-3    Exercise 0 times weekly - describe none    Sunscreen used - Yes    Seatbelts used - Yes    Guns stored in the home - No    Self Breast Exam - Yes    Pap test up to date -  Yes and as of today    Eye exam up to date -  Yes    Dental exam up to date -  No    DEXA scan up to date -  No    Flex Sig/Colonoscopy up to date -  No    Mammography up to date -  No    Immunizations reviewed and up to date - Yes and Tdap 1/13/11    Abuse: Current or Past (Physical, Sexual or Emotional) - No    Do you feel safe in your environment - Yes    Do you cope well with stress - Most times    Do you suffer from insomnia - Yes - not taking meds for this    Last updated by: Adelina Morales  2/7/2012                         Social Determinants of Health     Financial " Resource Strain:      Difficulty of Paying Living Expenses:    Food Insecurity:      Worried About Running Out of Food in the Last Year:      Ran Out of Food in the Last Year:    Transportation Needs:      Lack of Transportation (Medical):      Lack of Transportation (Non-Medical):    Physical Activity:      Days of Exercise per Week:      Minutes of Exercise per Session:    Stress:      Feeling of Stress :    Social Connections:      Frequency of Communication with Friends and Family:      Frequency of Social Gatherings with Friends and Family:      Attends Holiness Services:      Active Member of Clubs or Organizations:      Attends Club or Organization Meetings:      Marital Status:    Intimate Partner Violence:      Fear of Current or Ex-Partner:      Emotionally Abused:      Physically Abused:      Sexually Abused:        Current Outpatient Medications   Medication Sig Dispense Refill     acetaminophen-codeine (TYLENOL #3) 300-30 MG tablet Take 1 tablet by mouth every 8 hours as needed for severe pain And ok to take 1 extra if needed 100 tablet 0     ASPIRIN 81 MG OR TABS 1 tab po QD (Once per day) 100 3     CALCIUM 600 + D 600-200 MG-UNIT OR TABS 2 tabs daily  3     clotrimazole (LOTRIMIN) 1 % vaginal cream PLACE 1 APPLICATORFUL IN THE VAGINA DAILY AND APPLY EXTERNALLY TWICE DAILY AS DIRECTED 45 g 3     clotrimazole (SM CLOTRIMAZOLE VAGINAL) 1 % vaginal cream Place 1 Applicatorful vaginally daily And apply externally bid 45 g 0     CRANBERRY 500 MG OR CAPS 1 CAPSULE 2 TIMES DAILY 90 0     diclofenac (VOLTAREN) 75 MG EC tablet Take 1 tablet (75 mg) by mouth 2 times daily 180 tablet 0     EPINEPHrine (ANY BX GENERIC EQUIV) 0.3 MG/0.3ML injection 2-pack Inject 0.3 mLs (0.3 mg) into the muscle once as needed for anaphylaxis 0.6 mL 3     ketotifen (ZADITOR) 0.025 % ophthalmic solution Place 1 drop into both eyes 2 times daily       levothyroxine (SYNTHROID/LEVOTHROID) 75 MCG tablet TAKE 1 TABLET(75 MCG) BY MOUTH  DAILY 90 tablet 0     Miconazole Nitrate 2 % POWD 1 Application daily as needed 1 Bottle 3     neomycin-polymyxin-hydrocortisone (CORTISPORIN) 3.5-75913-9 otic suspension Place 3 drops into both ears 3 times daily 10 mL 3     Omega-3 Fatty Acids (FISH OIL) 1200 MG capsule Take 1 capsule by mouth daily.       polyethylene glycol 0.4%- propylene glycol 0.3% (SYSTANE ULTRA) 0.4-0.3 % SOLN ophthalmic solution Place 1 drop into both eyes as needed for dry eyes 1 Bottle 12     rizatriptan (MAXALT-MLT) 5 MG ODT Take 1-2 tablets (5-10 mg) by mouth at onset of headache for migraine May repeat dose in 2 hours.  Do not exceed 30 mg in 24 hours 18 tablet 5     simvastatin (ZOCOR) 40 MG tablet TAKE 1 TABLET(40 MG) BY MOUTH AT BEDTIME 90 tablet 3     tiZANidine (ZANAFLEX) 2 MG tablet TAKE 1 TABLET(2 MG) BY MOUTH THREE TIMES DAILY AS NEEDED FOR MUSCLE SPASMS 270 tablet 5       Allergies   Allergen Reactions     Imitrex [Sumatriptan Succinate] Shortness Of Breath     Zomig [Zolmitriptan] Shortness Of Breath     Sulfa Drugs      Sumatriptan        REVIEW OF SYSTEMS:  CONSTITUTIONAL:  NEGATIVE for fever, chills, change in weight, not feeling tired  SKIN:  NEGATIVE for worrisome rashes, no skin lumps, no skin ulcers and no non-healing wounds  EYES:  NEGATIVE for vision changes or irritation.  ENT/MOUTH:  NEGATIVE.  No hearing loss, no hoarseness, no difficulty swallowing.  RESP:  NEGATIVE. No cough or shortness of breath.  CV:  NEGATIVE for chest pain, palpitations or peripheral edema  GI:  NEGATIVE for nausea, abdominal pain, heartburn, or change in bowel habits  :  Negative. No dysuria, no hematuria  MUSCULOSKELETAL:  See HPI above  NEURO:  NEGATIVE . No headaches, no dizziness,  no numbness  ENDOCRINE:  NEGATIVE for temperature intolerance, skin/hair changes  HEME/ALLERGY/IMMUNE:  NEGATIVE for bleeding problems  PSYCHIATRIC:  NEGATIVE. no anxiety, no depression.     Exam:  Vitals: BP (!) 143/102   Pulse 91   Resp 16   Ht 1.734  "m (5' 8.25\")   Wt 69.5 kg (153 lb 3.2 oz)   BMI 23.12 kg/m    BMI= Body mass index is 23.12 kg/m .  Constitutional:  healthy, alert and no distress  Neuro: Alert and Oriented x 3, Sensation grossly WNL.  Psych: Affect normal   Respiratory: Breathing not labored.  Cardiovascular: normal peripheral pulses  Lymph: no adenopathy  Skin: No rashes,worrisome lesions or skin problems  She has mild tenderness across the low back.  She has extremely poor motion of the hips.  She has an adduction contracture of the right hip preventing it from getting even into a neutral position.  Left hip is not contracted there but cannot go out in more than 10 degrees abduction.    She has external rotation of only about 20 degrees of each hip.  She has internal rotation to neutral of each hip.  She does have good motion of the knees.    Assessment:  Bilateral severe hip osteoarthritis.  Plan: Total hip arthroplasty would be her best solution to this.  She is not willing to proceed with this.  She has been using diclofenac.  She does have walker to use now.  I am not sure if steroid injection would add much, but it is the only thing really we could add to this.  She did not want to pursue that yet.  Return to clinic as needed.      Again, thank you for allowing me to participate in the care of your patient.        Sincerely,        Elias Marsh MD    "

## 2021-09-14 PROBLEM — M16.0 PRIMARY OSTEOARTHRITIS OF BOTH HIPS: Status: ACTIVE | Noted: 2021-09-14

## 2021-09-14 NOTE — PROGRESS NOTES
Argenis Marcos is a 75 year old female who is seen as self referral for right hip pain.  She has had problems with both hips for many years.  X-ray of the left hip in 2012 was very much bone-on-bone.  She uses a walker to walk all of the time.  She has now had increased pain over the last 3 weeks.  She does some pedaling exercises with a small machine at home and thinks she overstretched.  She has shooting pain in the posterior thigh.  It hurts to sit on this.  Some of the pain is migrated around to the groin.    X-ray shows severe osteoarthritis of both hips with bone erosion superiorly.  Much of the socket is filled actually with bone spur below the femoral heads.    Past Medical History:   Diagnosis Date     Cataract 12/1/2011     Depressive disorder, not elsewhere classified      Esophageal reflux      Lymphoproliferat disease      Osteoarthrosis, unspecified whether generalized or localized, unspecified site      Pure hypercholesterolemia      Thyroid disease        Past Surgical History:   Procedure Laterality Date     HC DILATION/CURETTAGE DIAG/THER NON OB  5/21/04    D & C,hysteroscopic resection of large submucosal fibroid     ZZC NONSPECIFIC PROCEDURE      URETHRAL DILATATION       Family History   Problem Relation Age of Onset     Hypertension Mother      Cancer Mother      Glaucoma Mother      Hypertension Sister      Breast Cancer Sister      Hypertension Brother      Heart Disease Father      Lipids Father      Diabetes Father      Cancer - colorectal No family hx of      Cerebrovascular Disease No family hx of      Thyroid Disease No family hx of      Macular Degeneration No family hx of        Social History     Socioeconomic History     Marital status: Single     Spouse name: Not on file     Number of children: Not on file     Years of education: Not on file     Highest education level: Not on file   Occupational History     Not on file   Tobacco Use     Smoking status: Former Smoker     Smokeless  "tobacco: Never Used     Tobacco comment: quit in the 70's   Substance and Sexual Activity     Alcohol use: No     Drug use: No     Sexual activity: Never   Other Topics Concern      Service No     Blood Transfusions No     Caffeine Concern No     Occupational Exposure Yes     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 No   Social History Narrative    Caffeine intake/servings daily - 1    Calcium intake/servings daily - 1-3    Exercise 0 times weekly - describe none    Sunscreen used - Yes    Seatbelts used - Yes    Guns stored in the home - No    Self Breast Exam - Yes    Pap test up to date -  Yes and as of today    Eye exam up to date -  Yes    Dental exam up to date -  No    DEXA scan up to date -  No    Flex Sig/Colonoscopy up to date -  No    Mammography up to date -  No    Immunizations reviewed and up to date - Yes and Tdap 1/13/11    Abuse: Current or Past (Physical, Sexual or Emotional) - No    Do you feel safe in your environment - Yes    Do you cope well with stress - Most times    Do you suffer from insomnia - Yes - not taking meds for this    Last updated by: Adelina Morales  2/7/2012                         Social Determinants of Health     Financial Resource Strain:      Difficulty of Paying Living Expenses:    Food Insecurity:      Worried About Running Out of Food in the Last Year:      Ran Out of Food in the Last Year:    Transportation Needs:      Lack of Transportation (Medical):      Lack of " Transportation (Non-Medical):    Physical Activity:      Days of Exercise per Week:      Minutes of Exercise per Session:    Stress:      Feeling of Stress :    Social Connections:      Frequency of Communication with Friends and Family:      Frequency of Social Gatherings with Friends and Family:      Attends Zoroastrian Services:      Active Member of Clubs or Organizations:      Attends Club or Organization Meetings:      Marital Status:    Intimate Partner Violence:      Fear of Current or Ex-Partner:      Emotionally Abused:      Physically Abused:      Sexually Abused:        Current Outpatient Medications   Medication Sig Dispense Refill     acetaminophen-codeine (TYLENOL #3) 300-30 MG tablet Take 1 tablet by mouth every 8 hours as needed for severe pain And ok to take 1 extra if needed 100 tablet 0     ASPIRIN 81 MG OR TABS 1 tab po QD (Once per day) 100 3     CALCIUM 600 + D 600-200 MG-UNIT OR TABS 2 tabs daily  3     clotrimazole (LOTRIMIN) 1 % vaginal cream PLACE 1 APPLICATORFUL IN THE VAGINA DAILY AND APPLY EXTERNALLY TWICE DAILY AS DIRECTED 45 g 3     clotrimazole (SM CLOTRIMAZOLE VAGINAL) 1 % vaginal cream Place 1 Applicatorful vaginally daily And apply externally bid 45 g 0     CRANBERRY 500 MG OR CAPS 1 CAPSULE 2 TIMES DAILY 90 0     diclofenac (VOLTAREN) 75 MG EC tablet Take 1 tablet (75 mg) by mouth 2 times daily 180 tablet 0     EPINEPHrine (ANY BX GENERIC EQUIV) 0.3 MG/0.3ML injection 2-pack Inject 0.3 mLs (0.3 mg) into the muscle once as needed for anaphylaxis 0.6 mL 3     ketotifen (ZADITOR) 0.025 % ophthalmic solution Place 1 drop into both eyes 2 times daily       levothyroxine (SYNTHROID/LEVOTHROID) 75 MCG tablet TAKE 1 TABLET(75 MCG) BY MOUTH DAILY 90 tablet 0     Miconazole Nitrate 2 % POWD 1 Application daily as needed 1 Bottle 3     neomycin-polymyxin-hydrocortisone (CORTISPORIN) 3.5-44633-7 otic suspension Place 3 drops into both ears 3 times daily 10 mL 3     Omega-3 Fatty Acids (FISH  "OIL) 1200 MG capsule Take 1 capsule by mouth daily.       polyethylene glycol 0.4%- propylene glycol 0.3% (SYSTANE ULTRA) 0.4-0.3 % SOLN ophthalmic solution Place 1 drop into both eyes as needed for dry eyes 1 Bottle 12     rizatriptan (MAXALT-MLT) 5 MG ODT Take 1-2 tablets (5-10 mg) by mouth at onset of headache for migraine May repeat dose in 2 hours.  Do not exceed 30 mg in 24 hours 18 tablet 5     simvastatin (ZOCOR) 40 MG tablet TAKE 1 TABLET(40 MG) BY MOUTH AT BEDTIME 90 tablet 3     tiZANidine (ZANAFLEX) 2 MG tablet TAKE 1 TABLET(2 MG) BY MOUTH THREE TIMES DAILY AS NEEDED FOR MUSCLE SPASMS 270 tablet 5       Allergies   Allergen Reactions     Imitrex [Sumatriptan Succinate] Shortness Of Breath     Zomig [Zolmitriptan] Shortness Of Breath     Sulfa Drugs      Sumatriptan        REVIEW OF SYSTEMS:  CONSTITUTIONAL:  NEGATIVE for fever, chills, change in weight, not feeling tired  SKIN:  NEGATIVE for worrisome rashes, no skin lumps, no skin ulcers and no non-healing wounds  EYES:  NEGATIVE for vision changes or irritation.  ENT/MOUTH:  NEGATIVE.  No hearing loss, no hoarseness, no difficulty swallowing.  RESP:  NEGATIVE. No cough or shortness of breath.  CV:  NEGATIVE for chest pain, palpitations or peripheral edema  GI:  NEGATIVE for nausea, abdominal pain, heartburn, or change in bowel habits  :  Negative. No dysuria, no hematuria  MUSCULOSKELETAL:  See HPI above  NEURO:  NEGATIVE . No headaches, no dizziness,  no numbness  ENDOCRINE:  NEGATIVE for temperature intolerance, skin/hair changes  HEME/ALLERGY/IMMUNE:  NEGATIVE for bleeding problems  PSYCHIATRIC:  NEGATIVE. no anxiety, no depression.     Exam:  Vitals: BP (!) 143/102   Pulse 91   Resp 16   Ht 1.734 m (5' 8.25\")   Wt 69.5 kg (153 lb 3.2 oz)   BMI 23.12 kg/m    BMI= Body mass index is 23.12 kg/m .  Constitutional:  healthy, alert and no distress  Neuro: Alert and Oriented x 3, Sensation grossly WNL.  Psych: Affect normal   Respiratory: Breathing " not labored.  Cardiovascular: normal peripheral pulses  Lymph: no adenopathy  Skin: No rashes,worrisome lesions or skin problems  She has mild tenderness across the low back.  She has extremely poor motion of the hips.  She has an adduction contracture of the right hip preventing it from getting even into a neutral position.  Left hip is not contracted there but cannot go out in more than 10 degrees abduction.    She has external rotation of only about 20 degrees of each hip.  She has internal rotation to neutral of each hip.  She does have good motion of the knees.    Assessment:  Bilateral severe hip osteoarthritis.  Plan: Total hip arthroplasty would be her best solution to this.  She is not willing to proceed with this.  She has been using diclofenac.  She does have walker to use now.  I am not sure if steroid injection would add much, but it is the only thing really we could add to this.  She did not want to pursue that yet.  Return to clinic as needed.

## 2021-09-16 DIAGNOSIS — M17.0 PRIMARY OSTEOARTHRITIS OF BOTH KNEES: ICD-10-CM

## 2021-09-16 DIAGNOSIS — M16.12 PRIMARY OSTEOARTHRITIS OF LEFT HIP: ICD-10-CM

## 2021-09-17 RX ORDER — DICLOFENAC SODIUM 75 MG/1
TABLET, DELAYED RELEASE ORAL
Qty: 180 TABLET | Refills: 0 | Status: SHIPPED | OUTPATIENT
Start: 2021-09-17 | End: 2021-12-23

## 2021-09-17 NOTE — TELEPHONE ENCOUNTER
Routing refill request to provider for review/approval because:  NSAID Medications [id number]/16/2021 02:36 PM   Blood pressure under 140/90 in past 12 months Protocol Details    Normal ALT on file in past 12 months     Normal AST on file in past 12 months     Patient is age 6-64 years     Normal CBC on file in past 12 months     Normal serum creatinine on file in past 12 months      Pending Prescriptions:                       Disp   Refills    diclofenac (VOLTAREN) 75 MG EC tablet [Pha*180 ta*0        Sig: TAKE 1 TABLET(75 MG) BY MOUTH TWICE DAILY      Renata Ramirez RN

## 2021-10-02 DIAGNOSIS — E78.5 HYPERLIPIDEMIA LDL GOAL <130: ICD-10-CM

## 2021-10-02 DIAGNOSIS — E03.4 HYPOTHYROIDISM DUE TO ACQUIRED ATROPHY OF THYROID: ICD-10-CM

## 2021-10-04 RX ORDER — LEVOTHYROXINE SODIUM 75 UG/1
TABLET ORAL
Qty: 90 TABLET | Refills: 0 | Status: SHIPPED | OUTPATIENT
Start: 2021-10-04 | End: 2022-01-21

## 2021-10-04 RX ORDER — SIMVASTATIN 40 MG
TABLET ORAL
Qty: 90 TABLET | Refills: 3 | Status: SHIPPED | OUTPATIENT
Start: 2021-10-04 | End: 2022-01-01

## 2021-10-04 NOTE — TELEPHONE ENCOUNTER
Routing refill request to provider for review/approval because:  Labs out of date:    TSH   Date Value Ref Range Status   08/17/2020 0.62 0.40 - 4.00 mU/L Final       Karen Covarrubias RN, BSN, PHN  [facility]: [place]

## 2021-10-04 NOTE — TELEPHONE ENCOUNTER
Routing refill request to provider for review/approval because:  Labs not current:    LDL Cholesterol Calculated   Date Value Ref Range Status   08/17/2020 91 <100 mg/dL Final     Comment:     Desirable:       <100 mg/dl       Karen Covarrubias RN, BSN, PHN  [facility]: [place]

## 2021-10-21 ENCOUNTER — THERAPY VISIT (OUTPATIENT)
Dept: PHYSICAL THERAPY | Facility: CLINIC | Age: 76
End: 2021-10-21
Payer: COMMERCIAL

## 2021-10-21 DIAGNOSIS — G89.29 CHRONIC PAIN OF BOTH KNEES: ICD-10-CM

## 2021-10-21 DIAGNOSIS — M25.562 CHRONIC PAIN OF BOTH KNEES: ICD-10-CM

## 2021-10-21 DIAGNOSIS — M25.561 CHRONIC PAIN OF BOTH KNEES: ICD-10-CM

## 2021-10-21 PROCEDURE — 97161 PT EVAL LOW COMPLEX 20 MIN: CPT | Mod: GP | Performed by: PHYSICAL THERAPIST

## 2021-10-21 PROCEDURE — 97530 THERAPEUTIC ACTIVITIES: CPT | Mod: GP | Performed by: PHYSICAL THERAPIST

## 2021-10-21 PROCEDURE — 97110 THERAPEUTIC EXERCISES: CPT | Mod: GP | Performed by: PHYSICAL THERAPIST

## 2021-10-21 ASSESSMENT — ACTIVITIES OF DAILY LIVING (ADL)
RAW_SCORE: 51
HOW_WOULD_YOU_RATE_THE_OVERALL_FUNCTION_OF_YOUR_KNEE_DURING_YOUR_USUAL_DAILY_ACTIVITIES?: NEARLY NORMAL
STAND: ACTIVITY IS MINIMALLY DIFFICULT
KNEE_ACTIVITY_OF_DAILY_LIVING_SCORE: 72.86
SQUAT: I AM UNABLE TO DO THE ACTIVITY
GIVING WAY, BUCKLING OR SHIFTING OF KNEE: I HAVE THE SYMPTOM BUT IT DOES NOT AFFECT MY ACTIVITY
RISE FROM A CHAIR: ACTIVITY IS MINIMALLY DIFFICULT
WALK: ACTIVITY IS NOT DIFFICULT
KNEE_ACTIVITY_OF_DAILY_LIVING_SUM: 51
AS_A_RESULT_OF_YOUR_KNEE_INJURY,_HOW_WOULD_YOU_RATE_YOUR_CURRENT_LEVEL_OF_DAILY_ACTIVITY?: NORMAL
SIT WITH YOUR KNEE BENT: ACTIVITY IS NOT DIFFICULT
HOW_WOULD_YOU_RATE_THE_CURRENT_FUNCTION_OF_YOUR_KNEE_DURING_YOUR_USUAL_DAILY_ACTIVITIES_ON_A_SCALE_FROM_0_TO_100_WITH_100_BEING_YOUR_LEVEL_OF_KNEE_FUNCTION_PRIOR_TO_YOUR_INJURY_AND_0_BEING_THE_INABILITY_TO_PERFORM_ANY_OF_YOUR_USUAL_DAILY_ACTIVITIES?: 80
SWELLING: I DO NOT HAVE THE SYMPTOM
GO UP STAIRS: ACTIVITY IS MINIMALLY DIFFICULT
KNEEL ON THE FRONT OF YOUR KNEE: I AM UNABLE TO DO THE ACTIVITY
PAIN: I HAVE THE SYMPTOM BUT IT DOES NOT AFFECT MY ACTIVITY
WEAKNESS: THE SYMPTOM AFFECTS MY ACTIVITY SLIGHTLY
GO DOWN STAIRS: ACTIVITY IS MINIMALLY DIFFICULT
STIFFNESS: I HAVE THE SYMPTOM BUT IT DOES NOT AFFECT MY ACTIVITY
LIMPING: I DO NOT HAVE THE SYMPTOM

## 2021-10-21 NOTE — PROGRESS NOTES
Physical Therapy Initial Evaluation  Subjective:  The history is provided by the patient.   Patient Health History         Pain is reported as 7/10 on pain scale.  General health as reported by patient is good.  Pertinent medical history includes: thyroid problems, osteoarthritis and migraines/headaches.   Red flags:  None as reported by patient.  Medical allergies: latex.   Surgeries include:  None.    Current medications:  Muscle relaxants, hormone replacement therapy, thyroid medication and pain medication.    Current occupation is Retired.                     Therapist Generated HPI Evaluation  Problem details: Pain began in 2013. Pt also has B hip pain - was told she has severe OA and needs a hip replacement. Pt is considering this.         Type of problem:  Bilateral knees.    This is a chronic condition.  Condition occurred with:  Insidious onset.  Where condition occurred: for unknown reasons.  Patient reports pain:  Anterior.  Pain is described as aching and is intermittent.  Pain radiates to:  Hip. Pain timing: no pattern.  Since onset symptoms are gradually improving.  Associated symptoms:  Loss of motion/stiffness and loss of strength. Symptoms are exacerbated by ascending stairs, bending/squatting, descending stairs, sitting, transfers, standing and walking  and relieved by rest, analgesics and NSAID's.  Special tests included:  X-ray (see chart).    Barriers include:  None as reported by patient.           Knee Activity of Daily Living Score: 72.86            Objective:    Gait:    Gait Type:  Antalgic   Assistive Devices:  Walker  Deviations:  Lumbar:  Trunk flexionGeneral Deviations:  Thais decr and stride length decr                                                 Hip Evaluation  HIP AROM:  AROM:    Left Hip:     Normal    Right Hip:   Normal                    Hip Strength:  Hip Strength:    Left:    Not assessed (not formally assessed)  Right:  Not assessed                            Hip  Palpation:    Left hip tenderness present at:   Piriformis and Gluteus Medius  Right hip tenderness present at:  Piriformis and Gluteus Medius  Functional Testing:          Quad:      Bilateral leg squat:  Excessive anterior knee excursion, fermoral IR and mild loss of control            Knee Evaluation:  ROM:  AROM: normal                Special Tests:   Left knee positive for the following special tests:  Patellar Compression  Right knee positive for the following tests:  Patellar Compression  Palpation:    Left knee tenderness present at:  Medial Joint Line; Lateral Joint Line and Patellar Medial  Right knee tenderness present at:  Medial Joint Line; Lateral Joint Line and Patellar Medial  Edema:  Normal    Mobility Testing:      Patellofemoral Medial:  Left: hypomobile    Right: hypomobile  Patellofemoral Lateral:  Left: hypomobile    Right: hypomobile      Functional Testing:          Quad:    Single Leg Squat:  Left:      Right:        Bilateral Leg Squat:   Excessive anterior knee excursion              General     ROS    Assessment/Plan:    Patient is a 75 year old female with both sides knee complaints.    Patient has the following significant findings with corresponding treatment plan.                Diagnosis 1:  B knee pain  Pain -  hot/cold therapy, manual therapy, self management, education, directional preference exercise and home program  Decreased strength - therapeutic exercise, therapeutic activities and home program  Impaired gait - gait training, assistive devices and home program    Therapy Evaluation Codes:   1) History comprised of:   Personal factors that impact the plan of care:      None.    Comorbidity factors that impact the plan of care are:      None.     Medications impacting care: None.  2) Examination of Body Systems comprised of:   Body structures and functions that impact the plan of care:      Hip, Knee and Lumbar spine.   Activity limitations that impact the plan of care are:       Bathing, Bending, Cooking, Dressing, Lifting, Sitting, Squatting/kneeling, Stairs, Standing, Walking and Sleeping.  3) Clinical presentation characteristics are:   Stable/Uncomplicated.  4) Decision-Making    Low complexity using standardized patient assessment instrument and/or measureable assessment of functional outcome.  Cumulative Therapy Evaluation is: Low complexity.    Previous and current functional limitations:  (See Goal Flow Sheet for this information)    Short term and Long term goals: (See Goal Flow Sheet for this information)     Communication ability:  Patient appears to be able to clearly communicate and understand verbal and written communication and follow directions correctly.  Treatment Explanation - The following has been discussed with the patient:   RX ordered/plan of care  Anticipated outcomes  Possible risks and side effects  This patient would benefit from PT intervention to resume normal activities.   Rehab potential is good.    Frequency:  1 X week, once daily  Duration:  for 6 weeks  Discharge Plan:  Achieve all LTG.  Independent in home treatment program.  Reach maximal therapeutic benefit.    Please refer to the daily flowsheet for treatment today, total treatment time and time spent performing 1:1 timed codes.

## 2021-10-22 PROBLEM — G89.29 CHRONIC PAIN OF BOTH KNEES: Status: ACTIVE | Noted: 2021-10-22

## 2021-10-22 PROBLEM — M25.561 CHRONIC PAIN OF BOTH KNEES: Status: ACTIVE | Noted: 2021-10-22

## 2021-10-22 PROBLEM — M25.562 CHRONIC PAIN OF BOTH KNEES: Status: ACTIVE | Noted: 2021-10-22

## 2021-11-08 ENCOUNTER — OFFICE VISIT (OUTPATIENT)
Dept: ORTHOPEDICS | Facility: CLINIC | Age: 76
End: 2021-11-08
Payer: COMMERCIAL

## 2021-11-08 VITALS
WEIGHT: 153 LBS | HEART RATE: 65 BPM | BODY MASS INDEX: 23.19 KG/M2 | SYSTOLIC BLOOD PRESSURE: 123 MMHG | RESPIRATION RATE: 16 BRPM | HEIGHT: 68 IN | DIASTOLIC BLOOD PRESSURE: 88 MMHG

## 2021-11-08 DIAGNOSIS — M17.0 PRIMARY OSTEOARTHRITIS OF BOTH KNEES: Primary | ICD-10-CM

## 2021-11-08 PROCEDURE — 20610 DRAIN/INJ JOINT/BURSA W/O US: CPT | Mod: 50 | Performed by: ORTHOPAEDIC SURGERY

## 2021-11-08 RX ORDER — LIDOCAINE HYDROCHLORIDE 10 MG/ML
5 INJECTION, SOLUTION INFILTRATION; PERINEURAL
Status: DISCONTINUED | OUTPATIENT
Start: 2021-11-08 | End: 2023-01-01

## 2021-11-08 RX ORDER — TRIAMCINOLONE ACETONIDE 40 MG/ML
80 INJECTION, SUSPENSION INTRA-ARTICULAR; INTRAMUSCULAR
Status: DISCONTINUED | OUTPATIENT
Start: 2021-11-08 | End: 2023-01-01

## 2021-11-08 RX ADMIN — TRIAMCINOLONE ACETONIDE 80 MG: 40 INJECTION, SUSPENSION INTRA-ARTICULAR; INTRAMUSCULAR at 15:26

## 2021-11-08 RX ADMIN — LIDOCAINE HYDROCHLORIDE 5 ML: 10 INJECTION, SOLUTION INFILTRATION; PERINEURAL at 15:26

## 2021-11-08 ASSESSMENT — MIFFLIN-ST. JEOR: SCORE: 1246.47

## 2021-11-08 NOTE — PROGRESS NOTES
Large Joint Injection/Arthocentesis: bilateral knee    Date/Time: 11/8/2021 3:26 PM  Performed by: Elias Marsh MD  Authorized by: Elias Marsh MD     Indications:  Pain  Needle Size:  22 G  Guidance: landmark guided    Approach:  Anteromedial  Location:  Knee  Laterality:  Bilateral      Medications (Right):  80 mg triamcinolone 40 MG/ML; 5 mL lidocaine 1 %  Medications (Left):  80 mg triamcinolone 40 MG/ML; 5 mL lidocaine 1 %  Outcome:  Tolerated well, no immediate complications  Procedure discussed: discussed risks, benefits, and alternatives    Consent Given by:  Patient  Timeout: timeout called immediately prior to procedure    Prep: patient was prepped and draped in usual sterile fashion

## 2021-11-08 NOTE — LETTER
11/8/2021         RE: Argenis Marcos  [address] [id number]        Dear Colleague,    Thank you for referring your patient, Argenis Marcos, to the [facility]. Please see a copy of my visit note below.    Large Joint Injection/Arthocentesis: bilateral knee    Date/Time: 11/8/2021 3:26 PM  Performed by: Elias Marsh MD  Authorized by: Elias Marsh MD     Indications:  Pain  Needle Size:  22 G  Guidance: landmark guided    Approach:  Anteromedial  Location:  Knee  Laterality:  Bilateral      Medications (Right):  80 mg triamcinolone 40 MG/ML; 5 mL lidocaine 1 %  Medications (Left):  80 mg triamcinolone 40 MG/ML; 5 mL lidocaine 1 %  Outcome:  Tolerated well, no immediate complications  Procedure discussed: discussed risks, benefits, and alternatives    Consent Given by:  Patient  Timeout: timeout called immediately prior to procedure    Prep: patient was prepped and draped in usual sterile fashion            Follow up bilateral knee primary osteoarthritis.  Last injection 3/29/21.  She has mild knee osteoarthritis by x-ray, but extremely severe bilateral hip osteoarthritis.    Range of motion 0-125 degrees..    She  desires injection today of bilateral knee(s).  Risks, benefits, potential complications and alternatives were discussed.   With the patient's consent, sterile prep was performed of bilateral knee(s).  Each knee was injected with Depo Medrol 80 mg and lidocaine at anteromedial site.  Return to clinic as needed.    We will consider total hip arthroplasty whenever she feels ready.         Again, thank you for allowing me to participate in the care of your patient.        Sincerely,        Elias Marsh MD

## 2021-11-09 NOTE — PROGRESS NOTES
Follow up bilateral knee primary osteoarthritis.  Last injection 3/29/21.  She has mild knee osteoarthritis by x-ray, but extremely severe bilateral hip osteoarthritis.    Range of motion 0-125 degrees..    She  desires injection today of bilateral knee(s).  Risks, benefits, potential complications and alternatives were discussed.   With the patient's consent, sterile prep was performed of bilateral knee(s).  Each knee was injected with Depo Medrol 80 mg and lidocaine at anteromedial site.  Return to clinic as needed.    We will consider total hip arthroplasty whenever she feels ready.

## 2021-12-21 DIAGNOSIS — M16.12 PRIMARY OSTEOARTHRITIS OF LEFT HIP: ICD-10-CM

## 2021-12-21 DIAGNOSIS — M17.0 PRIMARY OSTEOARTHRITIS OF BOTH KNEES: ICD-10-CM

## 2021-12-23 RX ORDER — DICLOFENAC SODIUM 75 MG/1
TABLET, DELAYED RELEASE ORAL
Qty: 180 TABLET | Refills: 0 | Status: SHIPPED | OUTPATIENT
Start: 2021-12-23 | End: 2022-03-23

## 2021-12-23 NOTE — TELEPHONE ENCOUNTER
Routing refill request to provider for review/approval because:  Drug not on the [facility] refill protocol    NSAID Medications Failed 12/21/2021 07:41 PM   Protocol Details  Normal ALT on file in past 12 months    Normal AST on file in past 12 months    Patient is age 6-64 years    Normal CBC on file in past 12 months    Normal serum creatinine on file in past 12 months

## 2022-01-01 ENCOUNTER — OFFICE VISIT (OUTPATIENT)
Dept: ORTHOPEDICS | Facility: CLINIC | Age: 77
End: 2022-01-01
Payer: COMMERCIAL

## 2022-01-01 ENCOUNTER — TELEPHONE (OUTPATIENT)
Dept: FAMILY MEDICINE | Facility: CLINIC | Age: 77
End: 2022-01-01

## 2022-01-01 ENCOUNTER — THERAPY VISIT (OUTPATIENT)
Dept: PHYSICAL THERAPY | Facility: CLINIC | Age: 77
End: 2022-01-01
Payer: COMMERCIAL

## 2022-01-01 ENCOUNTER — VIRTUAL VISIT (OUTPATIENT)
Dept: FAMILY MEDICINE | Facility: CLINIC | Age: 77
End: 2022-01-01
Payer: COMMERCIAL

## 2022-01-01 ENCOUNTER — OFFICE VISIT (OUTPATIENT)
Dept: OPHTHALMOLOGY | Facility: CLINIC | Age: 77
End: 2022-01-01
Payer: COMMERCIAL

## 2022-01-01 ENCOUNTER — NURSE TRIAGE (OUTPATIENT)
Dept: NURSING | Facility: CLINIC | Age: 77
End: 2022-01-01

## 2022-01-01 VITALS
WEIGHT: 150 LBS | RESPIRATION RATE: 18 BRPM | HEIGHT: 68 IN | SYSTOLIC BLOOD PRESSURE: 117 MMHG | HEART RATE: 83 BPM | DIASTOLIC BLOOD PRESSURE: 79 MMHG | BODY MASS INDEX: 22.73 KG/M2

## 2022-01-01 DIAGNOSIS — R05.9 COUGH, UNSPECIFIED TYPE: Primary | ICD-10-CM

## 2022-01-01 DIAGNOSIS — E78.5 HYPERLIPIDEMIA LDL GOAL <130: ICD-10-CM

## 2022-01-01 DIAGNOSIS — M16.12 PRIMARY OSTEOARTHRITIS OF LEFT HIP: ICD-10-CM

## 2022-01-01 DIAGNOSIS — M25.561 BILATERAL KNEE PAIN: Primary | ICD-10-CM

## 2022-01-01 DIAGNOSIS — Z13.220 SCREENING FOR HYPERLIPIDEMIA: ICD-10-CM

## 2022-01-01 DIAGNOSIS — Z13.1 SCREENING FOR DIABETES MELLITUS: ICD-10-CM

## 2022-01-01 DIAGNOSIS — M17.0 PRIMARY OSTEOARTHRITIS OF BOTH KNEES: ICD-10-CM

## 2022-01-01 DIAGNOSIS — M16.0 PRIMARY OSTEOARTHRITIS OF BOTH HIPS: Primary | ICD-10-CM

## 2022-01-01 DIAGNOSIS — N18.2 CKD (CHRONIC KIDNEY DISEASE) STAGE 2, GFR 60-89 ML/MIN: ICD-10-CM

## 2022-01-01 DIAGNOSIS — E03.4 HYPOTHYROIDISM DUE TO ACQUIRED ATROPHY OF THYROID: ICD-10-CM

## 2022-01-01 DIAGNOSIS — M25.551 BILATERAL HIP PAIN: ICD-10-CM

## 2022-01-01 DIAGNOSIS — M17.0 PRIMARY OSTEOARTHRITIS OF BOTH KNEES: Primary | ICD-10-CM

## 2022-01-01 DIAGNOSIS — R05.9 COUGH, UNSPECIFIED TYPE: ICD-10-CM

## 2022-01-01 DIAGNOSIS — M25.562 BILATERAL KNEE PAIN: Primary | ICD-10-CM

## 2022-01-01 DIAGNOSIS — H52.03 HYPEROPIA OF BOTH EYES WITH REGULAR ASTIGMATISM AND PRESBYOPIA: ICD-10-CM

## 2022-01-01 DIAGNOSIS — H43.813 POSTERIOR VITREOUS DETACHMENT OF BOTH EYES: ICD-10-CM

## 2022-01-01 DIAGNOSIS — H04.123 DRY EYE SYNDROME, BILATERAL: ICD-10-CM

## 2022-01-01 DIAGNOSIS — Z13.220 LIPID SCREENING: ICD-10-CM

## 2022-01-01 DIAGNOSIS — M25.552 BILATERAL HIP PAIN: ICD-10-CM

## 2022-01-01 DIAGNOSIS — Z79.899 ENCOUNTER FOR LONG-TERM (CURRENT) USE OF MEDICATIONS: ICD-10-CM

## 2022-01-01 DIAGNOSIS — H52.4 HYPEROPIA OF BOTH EYES WITH REGULAR ASTIGMATISM AND PRESBYOPIA: ICD-10-CM

## 2022-01-01 DIAGNOSIS — H25.813 COMBINED FORMS OF AGE-RELATED CATARACT OF BOTH EYES: Primary | ICD-10-CM

## 2022-01-01 DIAGNOSIS — H52.223 HYPEROPIA OF BOTH EYES WITH REGULAR ASTIGMATISM AND PRESBYOPIA: ICD-10-CM

## 2022-01-01 PROCEDURE — 92004 COMPRE OPH EXAM NEW PT 1/>: CPT | Performed by: STUDENT IN AN ORGANIZED HEALTH CARE EDUCATION/TRAINING PROGRAM

## 2022-01-01 PROCEDURE — 97161 PT EVAL LOW COMPLEX 20 MIN: CPT | Mod: GP | Performed by: PHYSICAL THERAPIST

## 2022-01-01 PROCEDURE — 92015 DETERMINE REFRACTIVE STATE: CPT | Performed by: STUDENT IN AN ORGANIZED HEALTH CARE EDUCATION/TRAINING PROGRAM

## 2022-01-01 PROCEDURE — 97110 THERAPEUTIC EXERCISES: CPT | Mod: GP | Performed by: PHYSICAL THERAPIST

## 2022-01-01 PROCEDURE — 99213 OFFICE O/P EST LOW 20 MIN: CPT | Mod: 95 | Performed by: FAMILY MEDICINE

## 2022-01-01 PROCEDURE — 99215 OFFICE O/P EST HI 40 MIN: CPT | Mod: 95 | Performed by: FAMILY MEDICINE

## 2022-01-01 PROCEDURE — 20610 DRAIN/INJ JOINT/BURSA W/O US: CPT | Mod: 50 | Performed by: ORTHOPAEDIC SURGERY

## 2022-01-01 RX ORDER — BENZONATATE 200 MG/1
200 CAPSULE ORAL 3 TIMES DAILY PRN
Qty: 30 CAPSULE | Refills: 0 | Status: SHIPPED | OUTPATIENT
Start: 2022-01-01

## 2022-01-01 RX ORDER — LIDOCAINE HYDROCHLORIDE 10 MG/ML
5 INJECTION, SOLUTION INFILTRATION; PERINEURAL
Status: DISCONTINUED | OUTPATIENT
Start: 2022-01-01 | End: 2023-01-01

## 2022-01-01 RX ORDER — AZITHROMYCIN 250 MG/1
TABLET, FILM COATED ORAL
Qty: 6 TABLET | Refills: 0 | Status: SHIPPED | OUTPATIENT
Start: 2022-01-01 | End: 2022-01-01

## 2022-01-01 RX ORDER — DICLOFENAC SODIUM 75 MG/1
TABLET, DELAYED RELEASE ORAL
Qty: 180 TABLET | Refills: 0 | Status: SHIPPED | OUTPATIENT
Start: 2022-01-01 | End: 2022-01-01

## 2022-01-01 RX ORDER — SIMVASTATIN 40 MG
40 TABLET ORAL AT BEDTIME
Qty: 90 TABLET | Refills: 0 | Status: SHIPPED | OUTPATIENT
Start: 2022-01-01 | End: 2023-01-01

## 2022-01-01 RX ORDER — LEVOTHYROXINE SODIUM 75 UG/1
TABLET ORAL
Qty: 90 TABLET | Refills: 0 | OUTPATIENT
Start: 2022-01-01

## 2022-01-01 RX ORDER — LEVOTHYROXINE SODIUM 75 UG/1
TABLET ORAL
Qty: 90 TABLET | Refills: 0 | Status: SHIPPED | OUTPATIENT
Start: 2022-01-01 | End: 2023-01-01

## 2022-01-01 RX ORDER — BENZONATATE 100 MG/1
100 CAPSULE ORAL 2 TIMES DAILY PRN
Qty: 20 CAPSULE | Refills: 0 | Status: SHIPPED | OUTPATIENT
Start: 2022-01-01 | End: 2022-01-01

## 2022-01-01 RX ORDER — LEVOTHYROXINE SODIUM 75 UG/1
TABLET ORAL
Qty: 90 TABLET | Refills: 0 | Status: SHIPPED | OUTPATIENT
Start: 2022-01-01 | End: 2022-01-01

## 2022-01-01 RX ORDER — DICLOFENAC SODIUM 75 MG/1
TABLET, DELAYED RELEASE ORAL
Qty: 180 TABLET | Refills: 0 | OUTPATIENT
Start: 2022-01-01

## 2022-01-01 RX ORDER — METHYLPREDNISOLONE ACETATE 80 MG/ML
80 INJECTION, SUSPENSION INTRA-ARTICULAR; INTRALESIONAL; INTRAMUSCULAR; SOFT TISSUE
Status: DISCONTINUED | OUTPATIENT
Start: 2022-01-01 | End: 2023-01-01

## 2022-01-01 RX ORDER — DICLOFENAC SODIUM 75 MG/1
TABLET, DELAYED RELEASE ORAL
Qty: 180 TABLET | Refills: 1 | Status: SHIPPED | OUTPATIENT
Start: 2022-01-01

## 2022-01-01 RX ADMIN — METHYLPREDNISOLONE ACETATE 80 MG: 80 INJECTION, SUSPENSION INTRA-ARTICULAR; INTRALESIONAL; INTRAMUSCULAR; SOFT TISSUE at 14:33

## 2022-01-01 RX ADMIN — LIDOCAINE HYDROCHLORIDE 5 ML: 10 INJECTION, SOLUTION INFILTRATION; PERINEURAL at 14:33

## 2022-01-01 ASSESSMENT — REFRACTION_WEARINGRX
OD_AXIS: 060
SPECS_TYPE: BIFOCAL
OD_SPHERE: +2.25
OD_ADD: +2.75
OS_AXIS: 105
OS_CYLINDER: +0.75
OD_CYLINDER: +1.00
OS_ADD: +2.75
OS_SPHERE: +2.25

## 2022-01-01 ASSESSMENT — REFRACTION_MANIFEST
OS_CYLINDER: +0.75
OD_SPHERE: +2.25
OD_CYLINDER: +0.75
OD_ADD: +2.75
OS_SPHERE: +2.25
OS_ADD: +2.75
OS_AXIS: 115
OD_AXIS: 060

## 2022-01-01 ASSESSMENT — SLIT LAMP EXAM - LIDS
COMMENTS: NORMAL
COMMENTS: NORMAL

## 2022-01-01 ASSESSMENT — CUP TO DISC RATIO
OS_RATIO: 0.55
OD_RATIO: 0.6

## 2022-01-01 ASSESSMENT — TONOMETRY
OS_IOP_MMHG: 14
IOP_METHOD: APPLANATION
OD_IOP_MMHG: 13

## 2022-01-01 ASSESSMENT — CONF VISUAL FIELD
OD_NORMAL: 1
OS_NORMAL: 1

## 2022-01-01 ASSESSMENT — VISUAL ACUITY
OS_CC: 20/40
METHOD: SNELLEN - LINEAR
OD_CC: 20/30
OD_CC: J1 BE
CORRECTION_TYPE: GLASSES

## 2022-01-01 ASSESSMENT — EXTERNAL EXAM - LEFT EYE: OS_EXAM: NORMAL

## 2022-01-01 ASSESSMENT — EXTERNAL EXAM - RIGHT EYE: OD_EXAM: NORMAL

## 2022-01-13 PROBLEM — M25.562 CHRONIC PAIN OF BOTH KNEES: Status: RESOLVED | Noted: 2021-10-22 | Resolved: 2022-01-13

## 2022-01-13 PROBLEM — M25.561 PAIN IN BOTH KNEES: Status: RESOLVED | Noted: 2021-04-15 | Resolved: 2022-01-13

## 2022-01-13 PROBLEM — M25.561 CHRONIC PAIN OF BOTH KNEES: Status: RESOLVED | Noted: 2021-10-22 | Resolved: 2022-01-13

## 2022-01-13 PROBLEM — G89.29 CHRONIC PAIN OF BOTH KNEES: Status: RESOLVED | Noted: 2021-10-22 | Resolved: 2022-01-13

## 2022-01-13 PROBLEM — M25.562 PAIN IN BOTH KNEES: Status: RESOLVED | Noted: 2021-04-15 | Resolved: 2022-01-13

## 2022-01-18 DIAGNOSIS — E03.4 HYPOTHYROIDISM DUE TO ACQUIRED ATROPHY OF THYROID: ICD-10-CM

## 2022-01-18 NOTE — TELEPHONE ENCOUNTER
Routing to team to please assist pt in scheduling visit. Has not been seen in clinic since 10/14/2020 and has not had a TSH blood draw since 8/17/2020.    Kristina Pedro RN

## 2022-01-19 NOTE — TELEPHONE ENCOUNTER
Pt scheduled a visit for March 2022; would provider place a angel refill to last her to her appointment. Pt is going to have surgery soon, and is scared of leaving her house during the cold due to the ice and falling.      levothyroxine (SYNTHROID/LEVOTHROID) 75 MCG tablet  simvastatin (ZOCOR) 40 MG tablet  tiZANidine (ZANAFLEX) 2 MG tablet        [phone] ok to gena Pedroza/  [place]

## 2022-01-19 NOTE — TELEPHONE ENCOUNTER
Routing refill request to provider for review/approval because:  TSH   Date Value Ref Range Status   08/17/2020 0.62 0.40 - 4.00 mU/L Final         Karen Covarrubias, RN, BSN, PHN  [facility]: [place]

## 2022-01-20 DIAGNOSIS — M62.838 MUSCLE SPASM: ICD-10-CM

## 2022-01-20 NOTE — TELEPHONE ENCOUNTER
Routing refill request to provider for review/approval because:  Drug not on the [facility] refill protocol

## 2022-01-21 RX ORDER — LEVOTHYROXINE SODIUM 75 UG/1
TABLET ORAL
Qty: 90 TABLET | Refills: 0 | Status: SHIPPED | OUTPATIENT
Start: 2022-01-21 | End: 2022-01-01

## 2022-01-21 RX ORDER — TIZANIDINE 2 MG/1
TABLET ORAL
Qty: 270 TABLET | Refills: 5 | Status: SHIPPED | OUTPATIENT
Start: 2022-01-21 | End: 2023-01-01

## 2022-03-15 NOTE — PROGRESS NOTES
"Argenis Marcos is a 74 year old female who is being evaluated via a billable telephone visit.      The patient has been notified of following:     \"This telephone visit will be conducted via a call between you and your physician/provider. We have found that certain health care needs can be provided without the need for a physical exam.  This service lets us provide the care you need with a short phone conversation.  If a prescription is necessary we can send it directly to your pharmacy.  If lab work is needed we can place an order for that and you can then stop by our lab to have the test done at a later time.    Telephone visits are billed at different rates depending on your insurance coverage. During this emergency period, for some insurers they may be billed the same as an in-person visit.  Please reach out to your insurance provider with any questions.    If during the course of the call the physician/provider feels a telephone visit is not appropriate, you will not be charged for this service.\"    Patient has given verbal consent for Telephone visit?  Yes    What phone number would you like to be contacted at?    How would you like to obtain your AVS? Mail a copy    Subjective     Argenis Marcos is a 74 year old female who presents via phone visit today for the following health issues:    HPI  Chronic Pain Follow-Up    Where in your body do you have pain? Knees  How has your pain affected your ability to work? Pain does not limit ability to work  Which of these pain treatments have you tried since your last clinic visit? Cold, Steroid Injections and Other: Tylenol, muscle relaxers  How well are you sleeping? Fair  How has your mood been since your last visit? Better  Have you had a significant life event? No  Other aggravating factors: prolonged standing  Taking medication as directed? Yes    PHQ-9 SCORE 8/31/2015 10/31/2016 11/18/2019   PHQ-9 Total Score - - -   PHQ-9 Total Score 2 0 9     No flowsheet " ----- Message from Jennifer Tracey sent at 3/15/2022 12:22 PM CDT -----  Regarding: Questions and Concerns  Contact: [phone]  Patients mom Sera calling in ref to patient getting a 2nd flu shot. Please call mom at [phone]      Thank You      data found.  No flowsheet data found.  Encounter-Level CSA:    There are no encounter-level csa.     Patient-Level CSA:    There are no patient-level csa.         How many servings of fruits and vegetables do you eat daily?  2-3    On average, how many sweetened beverages do you drink each day (Examples: soda, juice, sweet tea, etc.  Do NOT count diet or artificially sweetened beverages)?   0    How many days per week do you exercise enough to make your heart beat faster? 4    How many minutes a day do you exercise enough to make your heart beat faster? 30 - 60    How many days per week do you miss taking your medication? 0         Patient is wanting to come in for knee injection when clinic is reopened. Would prefer [facility] but will go to     Reviewed options and cannot come in in June    Needs refill of medications.l tolerating well. Reviewed risks of Tizanidine but needed to be able to function         Migraines controlled with Maxalt  Reviewed and updated as needed this visit by Provider         Review of Systems   Constitutional, HEENT, cardiovascular, pulmonary, gi and gu systems are negative, except as otherwise noted.       Objective   Reported vitals:  There were no vitals taken for this visit.   healthy, alert and no distress  PSYCH: Alert and oriented times 3; coherent speech, normal   rate and volume, able to articulate logical thoughts, able   to abstract reason, no tangential thoughts, no hallucinations   or delusions  Her affect is normal  RESP: No cough, no audible wheezing, able to talk in full sentences  Remainder of exam unable to be completed due to telephone visits    Diagnostic Test Results:  none         Assessment/Plan:  1. Hypothyroidism due to acquired atrophy of thyroid  Ok to refill  - levothyroxine (SYNTHROID/LEVOTHROID) 75 MCG tablet; TAKE 1 TABLET(75 MCG) BY MOUTH DAILY  Dispense: 90 tablet; Refill: 3    2. Migraine with aura and without status migrainosus, not intractable  Controled. Ok  to refill  - rizatriptan (MAXALT-MLT) 5 MG ODT; Take 1-2 tablets (5-10 mg) by mouth at onset of headache for migraine May repeat dose in 2 hours.  Do not exceed 30 mg in 24 hours  Dispense: 18 tablet; Refill: 5    3. Muscle spasm  Reviewed risks. She feels benefits outweigh risks  - tiZANidine (ZANAFLEX) 2 MG tablet; TAKE 1 TABLET(2 MG) BY MOUTH THREE TIMES DAILY AS NEEDED FOR MUSCLE SPASMS  Dispense: 270 tablet; Refill: 5    4. Primary osteoarthritis of left knee  Follow up appt in July for injection      Return in about 6 weeks (around 7/29/2020) for knee injection in clinic.      Phone call duration:  8 minutes    Obey Graves MD,

## 2022-03-23 ENCOUNTER — TELEPHONE (OUTPATIENT)
Dept: ORTHOPEDICS | Facility: CLINIC | Age: 77
End: 2022-03-23
Payer: COMMERCIAL

## 2022-03-23 DIAGNOSIS — M16.12 PRIMARY OSTEOARTHRITIS OF LEFT HIP: ICD-10-CM

## 2022-03-23 DIAGNOSIS — H60.92 OTITIS EXTERNA OF LEFT EAR, UNSPECIFIED CHRONICITY, UNSPECIFIED TYPE: ICD-10-CM

## 2022-03-23 DIAGNOSIS — M17.0 PRIMARY OSTEOARTHRITIS OF BOTH KNEES: ICD-10-CM

## 2022-03-23 RX ORDER — NEOMYCIN SULFATE, POLYMYXIN B SULFATE AND HYDROCORTISONE 10; 3.5; 1 MG/ML; MG/ML; [USP'U]/ML
SUSPENSION/ DROPS AURICULAR (OTIC)
Qty: 10 ML | Refills: 3 | Status: SHIPPED | OUTPATIENT
Start: 2022-03-23

## 2022-03-23 RX ORDER — DICLOFENAC SODIUM 75 MG/1
TABLET, DELAYED RELEASE ORAL
Qty: 180 TABLET | Refills: 0 | Status: SHIPPED | OUTPATIENT
Start: 2022-03-23 | End: 2022-06-09

## 2022-03-23 NOTE — TELEPHONE ENCOUNTER
Prescription approved per [facility] Refill Protocol.    Jn Diallo, RN, BSN, PHN  [facility]

## 2022-03-23 NOTE — TELEPHONE ENCOUNTER
Routing refill request to provider for review/approval because:  Labs not current:    Lab Results   Component Value Date    ALT 21 10/31/2016     AST   Date Value Ref Range Status   10/31/2016 12 0 - 45 U/L Final     Creatinine   Date Value Ref Range Status   08/17/2020 0.92 0.52 - 1.04 mg/dL Final     Lab Results   Component Value Date    WBC 10.4 08/17/2020     Lab Results   Component Value Date    RBC 4.76 08/17/2020     Lab Results   Component Value Date    HGB 14.1 08/17/2020     Lab Results   Component Value Date    HCT 43.5 08/17/2020     Lab Results   Component Value Date    MCV 91 08/17/2020     Lab Results   Component Value Date    MCH 29.6 08/17/2020     Lab Results   Component Value Date    MCHC 32.4 08/17/2020     Lab Results   Component Value Date    RDW 14.4 08/17/2020     Lab Results   Component Value Date     08/17/2020       Age 6-64      Jn Diallo, RN, BSN, PHN  [facility]

## 2022-03-23 NOTE — TELEPHONE ENCOUNTER
Reason for call:  Other   Patient called regarding (reason for call): She would like a copy of the photos and the providers a [facility].     She doesn't have access to the Internet. Please mail to her  Additional comments:     Phone number to reach patient:  Cell number on file:    Telephone Information:   Mobile [phone]     Best Time:      Can we leave a detailed message on this number?  YES    Travel screening: Not Applicable     [address] [facility] [id number]

## 2022-06-08 ENCOUNTER — TELEPHONE (OUTPATIENT)
Dept: FAMILY MEDICINE | Facility: CLINIC | Age: 77
End: 2022-06-08
Payer: COMMERCIAL

## 2022-06-08 DIAGNOSIS — M17.0 PRIMARY OSTEOARTHRITIS OF BOTH KNEES: ICD-10-CM

## 2022-06-08 DIAGNOSIS — M16.12 PRIMARY OSTEOARTHRITIS OF LEFT HIP: ICD-10-CM

## 2022-06-09 RX ORDER — DICLOFENAC SODIUM 75 MG/1
TABLET, DELAYED RELEASE ORAL
Qty: 180 TABLET | Refills: 0 | Status: SHIPPED | OUTPATIENT
Start: 2022-06-09 | End: 2022-01-01

## 2022-06-09 NOTE — TELEPHONE ENCOUNTER
Routing refill request to provider for review/approval because:  06/08/2022 06:15 PM   Protocol Details  Normal ALT on file in past 12 months    Normal AST on file in past 12 months    Recent (12 mo) or future (30 days) visit within the authorizing provider's specialty    Patient is age 6-64 years    Normal CBC on file in past 12 months    Normal serum creatinine on file in past 12 months

## 2022-09-02 NOTE — PROGRESS NOTES
In error Physical Therapy Initial Evaluation  Subjective:  The history is provided by the patient.   Patient Health History         Pain is reported as 8/10 on pain scale.  General health as reported by patient is good.  Pertinent medical history includes: thyroid problems and osteoarthritis.   Red flags:  None as reported by patient.  Medical allergies: none.   Surgeries include:  None.    Current medications:  Muscle relaxants, pain medication, thyroid medication and anti-inflammatory.    Current occupation is Retired.   Primary job tasks include:  Prolonged sitting.                  Therapist Generated HPI Evaluation  Problem details: Pain has been off and on for about 9 years.         Type of problem:  Bilateral knees (L>R).    This is a recurrent condition.  Condition occurred with:  Insidious onset.  Where condition occurred: for unknown reasons.  Patient reports pain:  Anterior and medial.  Pain is described as aching, sharp and shooting and is intermittent.  Pain radiates to:  Thigh. Pain timing: no pattern.  Since onset symptoms are gradually worsening.  Associated symptoms:  Loss of motion/stiffness and loss of strength. Symptoms are exacerbated by sitting, walking, bending/squatting, standing, transfers, descending stairs and ascending stairs  and relieved by rest.  Special tests included:  X-ray (OA).  Previous treatment includes physical therapy. There was moderate improvement following previous treatment.  Barriers include:  None as reported by patient.                        Objective:    Gait:    Assistive Devices:  Walker  Deviations:  General Deviations:  Thais decr and stride length decr    Flexibility/Screens:       Lower Extremity:  Decreased left lower extremity flexibility:Hamstrings    Decreased right lower extremity flexibility:  Hamstrings                                                 Hip Evaluation  HIP AROM:  AROM:    Left Hip:     Normal    Right Hip:   Normal                    Hip Strength:     Flexion:   Left: 4/5   Pain:  Right: 4/5   Pain:                    Extension:  Left: 4-/5  Pain:Right: 4-/5    Pain:    Abduction:  Left: 4-/5     Pain:Right: 4-/5    Pain:  Adduction:  Left: 4+/5    Pain:Right: 4+/5   Pain:                         Knee Evaluation:  ROM:  AROM: normal            Strength:     Extension:  Left: 4/5   Pain:      Right: 4/5   Pain:  Flexion:  Left: 4/5   Pain:      Right: 4/5   Pain:    Quad Set Left: Fair    Pain:   Quad Set Right: Fair    Pain:    Special Tests:   Left knee positive for the following special tests:  Patellar Compression and Patellar Tracking-Abduction Lateral  Right knee positive for the following tests:  Patellar Compression and Patellar Tracking-Abduction Lateral  Palpation:    Left knee tenderness present at:  Medial Joint Line; Lateral Joint Line and Patellar Medial  Right knee tenderness present at:  Medial Joint Line; Lateral Joint Line and Patellar Medial  Edema:  Normal    Mobility Testing:      Patellofemoral Medial:  Left: hypomobile    Right: hypomobile  Patellofemoral Lateral:  Left: hypomobile    Right: hypomobile      Functional Testing:          Quad:    Single Leg Squat:  Left:      Right:        Bilateral Leg Squat:   Excessive anterior knee excursion and mild loss of control              General     ROS    Assessment/Plan:    Patient is a 75 year old female with both sides knee complaints.    Patient has the following significant findings with corresponding treatment plan.                Diagnosis 1:  B knee pain  Pain -  hot/cold therapy, manual therapy, splint/taping/bracing/orthotics, self management, education and home program  Decreased ROM/flexibility - manual therapy, therapeutic exercise and home program  Decreased strength - therapeutic exercise, therapeutic activities and home program    Therapy Evaluation Codes:   1) History comprised of:   Personal factors that impact the plan of care:      None.    Comorbidity factors that impact  the plan of care are:      None.     Medications impacting care: None.  2) Examination of Body Systems comprised of:   Body structures and functions that impact the plan of care:      Hip, Knee and Lumbar spine.   Activity limitations that impact the plan of care are:      Bathing, Dressing, Sitting, Squatting/kneeling, Stairs, Standing, Walking and Sleeping.  3) Clinical presentation characteristics are:   Stable/Uncomplicated.  4) Decision-Making    Low complexity using standardized patient assessment instrument and/or measureable assessment of functional outcome.  Cumulative Therapy Evaluation is: Low complexity.    Previous and current functional limitations:  (See Goal Flow Sheet for this information)    Short term and Long term goals: (See Goal Flow Sheet for this information)     Communication ability:  Patient appears to be able to clearly communicate and understand verbal and written communication and follow directions correctly.  Treatment Explanation - The following has been discussed with the patient:   RX ordered/plan of care  Anticipated outcomes  Possible risks and side effects  This patient would benefit from PT intervention to resume normal activities.   Rehab potential is good.    Frequency:  1 X week, once daily  Duration:  for 8 weeks  Discharge Plan:  Achieve all LTG.  Independent in home treatment program.  Reach maximal therapeutic benefit.    Please refer to the daily flowsheet for treatment today, total treatment time and time spent performing 1:1 timed codes.

## 2022-09-19 NOTE — LETTER
9/19/2022         RE: Argenis Marcos  [address] [facility] [address]        Dear Colleague,    Thank you for referring your patient, Argenis Marcos, to the [facility]. Please see a copy of my visit note below.    Large Joint Injection/Arthocentesis: bilateral knee    Date/Time: 9/19/2022 2:33 PM  Performed by: Elias Marsh MD  Authorized by: Elias Marsh MD     Indications:  Pain and osteoarthritis  Needle Size:  22 G  Guidance: landmark guided    Approach:  Anterolateral  Location:  Knee  Laterality:  Bilateral      Medications (Right):  80 mg methylPREDNISolone 80 MG/ML; 5 mL lidocaine 1 %  Medications (Left):  80 mg methylPREDNISolone 80 MG/ML; 5 mL lidocaine 1 %  Outcome:  Tolerated well, no immediate complications  Procedure discussed: discussed risks, benefits, and alternatives    Consent Given by:  Patient  Timeout: timeout called immediately prior to procedure    Prep: patient was prepped and draped in usual sterile fashion          Follow up bilateral knee primary osteoarthritis.  Last injection 11/8/21.  She has mild knee osteoarthritis by x-ray, but extremely severe bilateral hip osteoarthritis.    Range of motion 0-125 degrees..    She  desires injection today of bilateral knee(s).  Risks, benefits, potential complications and alternatives were discussed.   With the patient's consent, sterile prep was performed of bilateral knee(s).  Each knee was injected with Depo Medrol 80 mg and lidocaine at anteromedial site.  Return to clinic as needed.    We will consider total hip arthroplasty whenever she feels ready.         Again, thank you for allowing me to participate in the care of your patient.        Sincerely,        Elias Marsh MD

## 2022-09-19 NOTE — PROGRESS NOTES
Large Joint Injection/Arthocentesis: bilateral knee    Date/Time: 9/19/2022 2:33 PM  Performed by: Elias Marsh MD  Authorized by: Elias Marsh MD     Indications:  Pain and osteoarthritis  Needle Size:  22 G  Guidance: landmark guided    Approach:  Anterolateral  Location:  Knee  Laterality:  Bilateral      Medications (Right):  80 mg methylPREDNISolone 80 MG/ML; 5 mL lidocaine 1 %  Medications (Left):  80 mg methylPREDNISolone 80 MG/ML; 5 mL lidocaine 1 %  Outcome:  Tolerated well, no immediate complications  Procedure discussed: discussed risks, benefits, and alternatives    Consent Given by:  Patient  Timeout: timeout called immediately prior to procedure    Prep: patient was prepped and draped in usual sterile fashion

## 2022-09-20 NOTE — PROGRESS NOTES
Follow up bilateral knee primary osteoarthritis.  Last injection 11/8/21.  She has mild knee osteoarthritis by x-ray, but extremely severe bilateral hip osteoarthritis.    Range of motion 0-125 degrees..    She  desires injection today of bilateral knee(s).  Risks, benefits, potential complications and alternatives were discussed.   With the patient's consent, sterile prep was performed of bilateral knee(s).  Each knee was injected with Depo Medrol 80 mg and lidocaine at anteromedial site.  Return to clinic as needed.    We will consider total hip arthroplasty whenever she feels ready.

## 2022-09-21 NOTE — TELEPHONE ENCOUNTER
Routing to PCP    Appears scripts are awaiting PCP signature.      Patient calling stating she is waiting for her refills on Levothyroxine and Diclofenac.    RN advised they are awaiting approval.    RN advised she would send PCP a message.    Patient hoping she can get scripts filled today as she is going to [facility]'s in the morning.    Jn Diallo RN, BSN, PHN  [facility]

## 2022-09-21 NOTE — TELEPHONE ENCOUNTER
Pt requesting refill for NSAID and thyroid scripts. Refill request rec'd earlier today and entered into EHR. See Refill Request. Advised pt call PCP office tomorrow when they are open to discuss refills. Pt voiced understanding and agreement.       Reason for Disposition    [1] Prescription refill request for NON-ESSENTIAL medicine (i.e., no harm to patient if med not taken) AND [2] triager unable to refill per department policy    Additional Information    Negative: New-onset or worsening symptoms, see that guideline (e.g., diarrhea, runny nose, sore throat)    Negative: Medicine question not related to refill or renewal    Negative: Caller (e.g., patient or pharmacist) requesting information about a new medicine    Negative: Caller requesting information unrelated to medicine    Negative: [1] Prescription refill request for ESSENTIAL medicine (i.e., likelihood of harm to patient if not taken) AND [2] triager unable to refill per department policy    Negative: [1] Prescription not at pharmacy AND [2] was prescribed by PCP recently  (Exception: triager has access to EMR and prescription is recorded there. Go to Home Care and confirm for pharmacy.)    Negative: [1] Pharmacy calling with prescription questions AND [2] triager unable to answer question    Negative: Prescription request for new medicine (not a refill)    Negative: Caller requesting a CONTROLLED substance prescription refill (e.g., narcotics, ADHD medicines)    Protocols used: MEDICATION REFILL AND RENEWAL CALL-A-

## 2022-09-28 NOTE — PATIENT INSTRUCTIONS
Continue Zaditor twice a day as needed for eye itchiness (over-the-counter eyedrop).    Continue artificial tears up to four times a day as needed    Glasses prescription given - optional to update    Sunshine Bell MD  [phone]

## 2022-09-28 NOTE — PROGRESS NOTES
Current Eye Medications:  Zaditor 1 drop into both eyes twice a day.     Subjective:  She is here for complete eye exam.  She notes her vision has gotten worse for distance especially. She notes her glasses are about 3 years old.   She complains of burning and itching on both eyes.   She complains of visual disturbance on the temporal side of her vision, with both eyes. She describes it like water. Does not happen often.     She denies floaters/flashes of lights.  Her mother had glaucoma.    NEEL Amador  2:54 PM 09/28/2022    Objective:  See Ophthalmology Exam.      Assessment:  Argenis Marcos is a 76 year old female who presents with:   Encounter Diagnoses   Name Primary?     Combined forms of age-related cataract of both eyes Yes     Posterior vitreous detachment of both eyes      Dry eye syndrome, bilateral      Hyperopia of both eyes with regular astigmatism and presbyopia        Plan:  Continue Zaditor twice a day as needed for eye itchiness (over-the-counter eyedrop).    Continue artificial tears up to four times a day as needed    Glasses prescription given - optional to update    Sunshine Bell MD  [phone]

## 2022-09-28 NOTE — LETTER
9/28/2022         RE: Argenis Marcos  [address] [address]        Dear Colleague,    Thank you for referring your patient, Argenis Marcos, to the [facility]. Please see a copy of my visit note below.    Current Eye Medications:  Zaditor 1 drop into both eyes twice a day.     Subjective:  She is here for complete eye exam.  She notes her vision has gotten worse for distance especially. She notes her glasses are about 3 years old.   She complains of burning and itching on both eyes.   She complains of visual disturbance on the temporal side of her vision, with both eyes. She describes it like water. Does not happen often.     She denies floaters/flashes of lights.     NEEL Amador  2:54 PM 09/28/2022    Objective:  See Ophthalmology Exam.      Assessment:  Argenis Marcos is a 76 year old female who presents with:   Encounter Diagnoses   Name Primary?     Combined forms of age-related cataract of both eyes Yes     Posterior vitreous detachment of both eyes      Dry eye syndrome, bilateral      Hyperopia of both eyes with regular astigmatism and presbyopia        Plan:  Continue Zaditor twice a day as needed for eye itchiness (over-the-counter eyedrop).    Continue artificial tears up to four times a day as needed    Glasses prescription given - optional to update    Sunshine Bell MD  [phone]              Again, thank you for allowing me to participate in the care of your patient.        Sincerely,        Sunshine Bell MD

## 2022-09-30 NOTE — PROGRESS NOTES
Physical Therapy Initial Evaluation  Subjective:  The history is provided by the patient.   Patient Health History         Pain is reported as 7/10 on pain scale.  General health as reported by patient is good.  Pertinent medical history includes: osteoarthritis.   Red flags:  None as reported by patient.  Medical allergies: none.       Current medications:  Thyroid medication, pain medication and muscle relaxants.    Current occupation is Retired.                     Therapist Generated HPI Evaluation  Problem details: Patient is going to have MONTY but is wanting to wait until after the holidays. She would like to strengthen before surgery .         Type of problem:  Bilateral hips.    This is a chronic condition.  Condition occurred with:  Degenerative joint disease and insidious onset.  Where condition occurred: for unknown reasons.  Patient reports pain:  Greater trochanter, groin and lateral.  Pain is described as aching and is intermittent.  Pain radiates to:  Knee.   Since onset symptoms are unchanged.  Associated symptoms:  Loss of motion/stiffness and loss of strength. Symptoms are exacerbated by transfers, bending/squatting, sitting, walking, standing, descending stairs and ascending stairs  and relieved by rest and analgesics.  Special tests included:  X-ray (mild knee OA, extremely severe hip OA bilat).  Previous treatment includes physical therapy. There was mild improvement following previous treatment.  Barriers include:  None as reported by patient.                        Objective:    Gait:    Gait Type:  Antalgic   Assistive Devices:  Walker  Deviations:  General Deviations:  Thais decr and stride length decr    Flexibility/Screens:       Lower Extremity:  Decreased left lower extremity flexibility:Hip ER's; Piriformis and Hamstrings    Decreased right lower extremity flexibility:  Hip ER's; Piriformis and Hamstrings                                                 Hip Evaluation    Hip Strength:     Flexion:   Left: 4+/5   Pain:  Right: 4/5   Pain:                    Extension:  Left: 3+/5  Pain:Right: 4-/5    Pain:    Abduction:  Left: 4/5     Pain:Right: 4/5    Pain:  Adduction:  Left: 4+/5    Pain:Right: 4+/5   Pain:                  Hip Palpation:    Left hip tenderness present at:   IT Band and Gluteus Medius  Right hip tenderness present at:  IT Band and Gluteus Medius       Knee Evaluation:  ROM:    AROM      Extension:  Left: 0    Right:  0  Flexion: Left: 125    Right: 125    Pain: at end ranges          Palpation:    Left knee tenderness present at:  Medial Joint Line; Lateral Joint Line and Patellar Medial  Right knee tenderness present at:  Patellar Medial  Right knee tenderness not present at:  Medial Joint Line and Lateral Joint Line  Edema:  Normal    Mobility Testing:      Patellofemoral Medial:  Left: hypomobile    Right: hypomobile  Patellofemoral Lateral:  Left: hypomobile    Right: hypomobile      Functional Testing:          Quad:    Single Leg Squat:  Left:      Right:        Bilateral Leg Squat:   Femoral IR, hip substitution and moderate loss of control              General     ROS    Assessment/Plan:    Patient is a 76 year old female with both sides hip complaints.    Patient has the following significant findings with corresponding treatment plan.                Diagnosis 1:  B hip pain  Pain -  hot/cold therapy, manual therapy, splint/taping/bracing/orthotics, self management, education, directional preference exercise and home program  Decreased ROM/flexibility - manual therapy, therapeutic exercise and home program  Decreased strength - therapeutic exercise, therapeutic activities and home program  Impaired gait - gait training, assistive devices and home program    Therapy Evaluation Codes:   1) History comprised of:   Personal factors that impact the plan of care:      None.    Comorbidity factors that impact the plan of care are:      Osteoarthritis.     Medications impacting  2020 02:35 care: None.  2) Examination of Body Systems comprised of:   Body structures and functions that impact the plan of care:      Hip, Knee and Lumbar spine.   Activity limitations that impact the plan of care are:      Bathing, Bending, Dressing, Sitting, Squatting/kneeling, Stairs, Standing, Walking and Sleeping.  3) Clinical presentation characteristics are:   Stable/Uncomplicated.  4) Decision-Making    Low complexity using standardized patient assessment instrument and/or measureable assessment of functional outcome.  Cumulative Therapy Evaluation is: Low complexity.    Previous and current functional limitations:  (See Goal Flow Sheet for this information)    Short term and Long term goals: (See Goal Flow Sheet for this information)     Communication ability:  Patient appears to be able to clearly communicate and understand verbal and written communication and follow directions correctly.  Treatment Explanation - The following has been discussed with the patient:   RX ordered/plan of care  Anticipated outcomes  Possible risks and side effects  This patient would benefit from PT intervention to resume normal activities.   Rehab potential is good.    Frequency:  1 X week, once daily  Duration:  for 8 weeks  Discharge Plan:  Achieve all LTG.  Independent in home treatment program.  Reach maximal therapeutic benefit.    Please refer to the daily flowsheet for treatment today, total treatment time and time spent performing 1:1 timed codes.

## 2022-10-04 NOTE — PROGRESS NOTES
[facility]    OUTPATIENT Physical Therapy ORTHOPEDIC EVALUATION  PLAN OF TREATMENT FOR OUTPATIENT REHABILITATION  (COMPLETE FOR INITIAL CLAIMS ONLY)  Patient's Last Name, First Name, M.I.  YOB: 1945  Argenis Marcos    Provider s Name:  [facility]   Medical Record No.  [id number]   Start of Care Date:  09/30/22   Onset Date:   03/01/22   Type:     _X__PT   ___OT Medical Diagnosis:    Encounter Diagnoses   Name Primary?     Bilateral knee pain Yes     Bilateral hip pain         Treatment Diagnosis:  B hip pain        Goals:     09/30/22 0500   Body Part   Goals listed below are for B hip/knee pain   Goal #1   Goal #1 ambulation   Previous Functional Level No restrictions   Current Functional Level Minutes patient can walk;with walker   Performance Level 10, 6/10 pain   STG Target Performance Minutes patient will be able to walk;with walker   Performance Level 10, 3/10 pain   Rationale for safe household ambulation;for safe community ambulation;for safe outdoor household ambulation   Due Date 10/28/22    LTG Target Performance Minutes patient will be able to  walk;with walker   Performance Level 10, 0/10 pain   Rationale for safe household ambulation;for safe outdoor household ambulation;for safe community ambulation   Due Date 11/25/22       Therapy Frequency:  1x/week  Predicted Duration of Therapy Intervention:  8 weeks    Renea Nicolas PT                 I CERTIFY THE NEED FOR THESE SERVICES FURNISHED UNDER        THIS PLAN OF TREATMENT AND WHILE UNDER MY CARE     (Physician attestation of this document indicates review and certification of the therapy plan).                     Certification Date From:  09/30/22   Certification Date To:  11/25/22    Referring Provider:  Referred Self    Initial Assessment        See Epic Evaluation SOC Date: 09/30/22

## 2022-11-09 NOTE — TELEPHONE ENCOUNTER
Patient calling with concern of allergy symptoms.    Patient states she normally has allergies during this time of year, however, she has been experiencing new symptom of persistent coughing a few days ago. Patient denies shortness of breath or chest pain.    Patient describes that her coughing is worse when sitting or laying down. Patient states it is a dry cough.    Patient has not taken any medications for symptoms. She has not taken a COVID test, as she says she has not left her house for weeks.     Patient would like message sent to provider to further advise, as she states an in-person visit would be difficult for her. Patient mentions she has bad arthritis in her knees and hips, therefore difficult to go far distances.    Advised if patient's symptoms worsen or she develops shortness of breath or chest pain to be seen in ED. Patient verbalized understanding and has no further questions at this time.    Routing to provider to please advise.    Salvador Barajas RN  [facility]

## 2022-11-09 NOTE — TELEPHONE ENCOUNTER
RN called and spoke with patient.    RN assisted patient in scheduling telephone visit for tomorrow.    Jn Diallo RN, BSN, PHN  [facility]

## 2022-11-10 NOTE — PROGRESS NOTES
Argenis is a 76 year old who is being evaluated via a billable telephone visit.      What phone number would you like to be contacted at? [phone]  How would you like to obtain your AVS? Morgan     ========================================================================    Assessment & Plan     Cough, unspecified type  - Concern for possible bacterial cause given length of symptoms so will send in abx  - Advised her to come in to be seen in clinic if not improving   - azithromycin (ZITHROMAX) 250 MG tablet; Take 2 tablets (500 mg) by mouth daily for 1 day, THEN 1 tablet (250 mg) daily for 4 days.  - benzonatate (TESSALON) 200 MG capsule; Take 1 capsule (200 mg) by mouth 3 times daily as needed for cough    Primary osteoarthritis of both knees  - diclofenac (VOLTAREN) 75 MG EC tablet; TAKE 1 TABLET(75 MG) BY MOUTH TWICE DAILY    Primary osteoarthritis of left hip  - diclofenac (VOLTAREN) 75 MG EC tablet; TAKE 1 TABLET(75 MG) BY MOUTH TWICE DAILY      Return in about 1 week (around 11/17/2022) for if symptoms worsen or fail to improve.    Ida Lafleur, [facility]    ===========================================================================    Subjective   Argenis is a 76 year old, presenting for the following health issues:  Cough    HPI     Acute Illness  Acute illness concerns: cough  Onset/Duration: Last Sunday or Monday (about 10 days ago)  Symptoms:  Fever: No  Chills/Sweats: No  Headache (location?): YES- off and on  Sinus Pressure: No  Conjunctivitis:  No  Ear Pain: no  Rhinorrhea: YES - has allergies  Congestion: No  Sore Throat: No  Cough: YES-non-productive  Wheeze: No  Decreased Appetite: No  Nausea: No  Vomiting: No  Diarrhea: No  Dysuria/Freq.: No  Dysuria or Hematuria: No  Fatigue/Achiness: No  Sick/Strep Exposure: YES- brother had sore throat  Therapies tried and outcome: None    Cough is keeping her up at night.  Denies chest pressure/tightness, and SOB.         Review of Systems   Constitutional, HEENT, cardiovascular, pulmonary, gi and gu systems are negative, except as otherwise noted.      Objective       Vitals:  No vitals were obtained today due to virtual visit.    Physical Exam   healthy, alert and no distress  PSYCH: Alert and oriented times 3; coherent speech, normal   rate and volume, able to articulate logical thoughts, able   to abstract reason, no tangential thoughts, no hallucinations   or delusions  Her affect is normal  RESP: No cough, no audible wheezing, able to talk in full sentences  Remainder of exam unable to be completed due to telephone visits          Phone call duration: 13 minutes

## 2022-12-08 PROBLEM — N18.2 CKD (CHRONIC KIDNEY DISEASE) STAGE 2, GFR 60-89 ML/MIN: Status: ACTIVE | Noted: 2022-01-01

## 2022-12-08 NOTE — PROGRESS NOTES
Argenis is a 77 year old who is being evaluated via a billable telephone visit.      What phone number would you like to be contacted at? [phone]    How would you like to obtain your AVS? Morgan    ICD-10-CM    1. Primary osteoarthritis of both knees  M17.0 diclofenac (VOLTAREN) 75 MG EC tablet     CBC with platelets and differential      2. Hypothyroidism due to acquired atrophy of thyroid  E03.4 TSH WITH FREE T4 REFLEX     levothyroxine (SYNTHROID/LEVOTHROID) 75 MCG tablet     CBC with platelets and differential      3. CKD (chronic kidney disease) stage 2, GFR 60-89 ml/min  N18.2       4. Hyperlipidemia LDL goal <130  E78.5 Lipid panel reflex to direct LDL Non-fasting     simvastatin (ZOCOR) 40 MG tablet     CBC with platelets and differential      5. Primary osteoarthritis of left hip  M16.12 diclofenac (VOLTAREN) 75 MG EC tablet     CBC with platelets and differential      6. Encounter for long-term (current) use of medications  Z79.899 CBC with platelets and differential      7. Screening for hyperlipidemia  Z13.220 Lipid panel reflex to direct LDL Non-fasting      8. Cough, unspecified type  R05.9 benzonatate (TESSALON) 100 MG capsule      9. Lipid screening  Z13.220       10. Screening for diabetes mellitus  Z13.1 Comprehensive metabolic panel (BMP + Alb, Alk Phos, ALT, AST, Total. Bili, TP)      new to this provider, lives with her brother  Cough-improved, took antibiotics     History of arthritis-pain tolerating with oral Voltaren labs done in the last 2 years, gets injections, she has not had one since September.  She will follow up with ortho, ultimately she needs a knee, hip replacement may be coming up soon    Hypothyroidism-stable symptoms, taking meds, no labs done for 2 years     High cholesterol-per patient taking med , no labs for 2 years, advised coming in person, for labs      ckd stage 2-not on arbs, no labs in 2 years, again advised coming in person to establish care, she is promising to  come in 1-2 months  Discussed that her medication may be harming her without lab monitoring    Refilled her meds today  She agrees to come in person for physical in person in the next 1-2 months  Advised making an appoint asap        Subjective   Argenis is a 77 year old presenting for the following health issues:  Establish Care      HPI   Establish care today.     Refill of Diclofenac requested.     Hyperlipidemia Follow-Up      Are you regularly taking any medication or supplement to lower your cholesterol?   No ran out and needs more    Are you having muscle aches or other side effects that you think could be caused by your cholesterol lowering medication?  No    Hypothyroidism Follow-up      Since last visit, patient describes the following symptoms: Weight stable, no hair loss, no skin changes, no constipation, no loose stools        Review of Systems   Constitutional, HEENT, cardiovascular, pulmonary, GI, , musculoskeletal, neuro, skin, endocrine and psych systems are negative, except as otherwise noted.      Objective           Vitals:  No vitals were obtained today due to virtual visit.    Physical Exam   healthy, alert and no distress  PSYCH: Alert and oriented times 3; coherent speech, normal   rate and volume, able to articulate logical thoughts, able   to abstract reason, no tangential thoughts, no hallucinations   or delusions  Her affect is normal  RESP: No cough, no audible wheezing, able to talk in full sentences  Remainder of exam unable to be completed due to telephone visits            Phone call duration:40 minutes

## 2022-12-12 NOTE — TELEPHONE ENCOUNTER
Dl Palm DO [facility]  Make a physical appoint tomorrow or next week for physical in 1-2 months   She needs fasting labs as well prior at that visit       Copied from CC Chart.    YOUSUF Gomez  [facility]

## 2022-12-13 NOTE — TELEPHONE ENCOUNTER
Called patient and left a voicemail message that needing to schedule an in person appointment to establish care/ wellness physical.    YOUSUF Gomez  [facility]

## 2022-12-15 NOTE — TELEPHONE ENCOUNTER
Heidi,     Patient called back to get help with scheduling. Patient will be available tomorrow after 2pm to schedule. Stated she can come in sometime at the end of February but per Dr. Santiago note she stated tomorrow or next week. Pt says she cannot come in until February.     [phone]    Thanks,  KERRY Lala  [facility]

## 2022-12-19 NOTE — TELEPHONE ENCOUNTER
Called patient and spoke to patients sister who takes care of patient. I explained that patient will need to establish care with an in person appointment. Dr Palm will not do virtual appointments to establish care.  Sister said she would relay that message to patient.      YOUSUF Gomez  [facility]

## 2022-12-19 NOTE — TELEPHONE ENCOUNTER
Routing to Dr Palm.  CHRISTIN.    Patient is scheduled beginning of April for her wellness/ establish care.    YOUSUF Gomez  [facility]

## 2023-01-01 ENCOUNTER — TELEPHONE (OUTPATIENT)
Dept: FAMILY MEDICINE | Facility: CLINIC | Age: 78
End: 2023-01-01
Payer: COMMERCIAL

## 2023-01-01 ENCOUNTER — VIRTUAL VISIT (OUTPATIENT)
Dept: FAMILY MEDICINE | Facility: CLINIC | Age: 78
End: 2023-01-01
Payer: COMMERCIAL

## 2023-01-01 ENCOUNTER — THERAPY VISIT (OUTPATIENT)
Dept: PHYSICAL THERAPY | Facility: CLINIC | Age: 78
End: 2023-01-01
Attending: ORTHOPAEDIC SURGERY
Payer: COMMERCIAL

## 2023-01-01 DIAGNOSIS — G89.29 CHRONIC PAIN OF BOTH KNEES: ICD-10-CM

## 2023-01-01 DIAGNOSIS — M16.12 PRIMARY OSTEOARTHRITIS OF LEFT HIP: ICD-10-CM

## 2023-01-01 DIAGNOSIS — M25.561 BILATERAL KNEE PAIN: Primary | ICD-10-CM

## 2023-01-01 DIAGNOSIS — E78.5 HYPERLIPIDEMIA LDL GOAL <130: ICD-10-CM

## 2023-01-01 DIAGNOSIS — M25.562 CHRONIC PAIN OF BOTH KNEES: ICD-10-CM

## 2023-01-01 DIAGNOSIS — M17.0 PRIMARY OSTEOARTHRITIS OF BOTH KNEES: ICD-10-CM

## 2023-01-01 DIAGNOSIS — M25.561 CHRONIC PAIN OF BOTH KNEES: ICD-10-CM

## 2023-01-01 DIAGNOSIS — G47.9 SLEEP TROUBLE: ICD-10-CM

## 2023-01-01 DIAGNOSIS — G43.109 MIGRAINE WITH AURA AND WITHOUT STATUS MIGRAINOSUS, NOT INTRACTABLE: ICD-10-CM

## 2023-01-01 DIAGNOSIS — E03.4 HYPOTHYROIDISM DUE TO ACQUIRED ATROPHY OF THYROID: Primary | ICD-10-CM

## 2023-01-01 DIAGNOSIS — M25.562 BILATERAL KNEE PAIN: Primary | ICD-10-CM

## 2023-01-01 DIAGNOSIS — L29.2 VULVAR ITCHING: ICD-10-CM

## 2023-01-01 DIAGNOSIS — M62.838 MUSCLE SPASM: ICD-10-CM

## 2023-01-01 DIAGNOSIS — M17.0 PRIMARY OSTEOARTHRITIS OF BOTH KNEES: Primary | ICD-10-CM

## 2023-01-01 DIAGNOSIS — E03.4 HYPOTHYROIDISM DUE TO ACQUIRED ATROPHY OF THYROID: ICD-10-CM

## 2023-01-01 DIAGNOSIS — N89.8 VAGINAL ITCHING: ICD-10-CM

## 2023-01-01 PROCEDURE — 97110 THERAPEUTIC EXERCISES: CPT | Mod: GP | Performed by: PHYSICAL THERAPIST

## 2023-01-01 PROCEDURE — 97161 PT EVAL LOW COMPLEX 20 MIN: CPT | Mod: GP | Performed by: PHYSICAL THERAPIST

## 2023-01-01 PROCEDURE — 97112 NEUROMUSCULAR REEDUCATION: CPT | Mod: GP | Performed by: PHYSICAL THERAPIST

## 2023-01-01 PROCEDURE — 99214 OFFICE O/P EST MOD 30 MIN: CPT | Mod: 95 | Performed by: NURSE PRACTITIONER

## 2023-01-01 RX ORDER — CLOTRIMAZOLE 1 %
CREAM (GRAM) TOPICAL 2 TIMES DAILY
Qty: 30 G | Refills: 0 | Status: SHIPPED | OUTPATIENT
Start: 2023-01-01

## 2023-01-01 RX ORDER — TIZANIDINE 2 MG/1
TABLET ORAL
Qty: 270 TABLET | Refills: 5 | Status: SHIPPED | OUTPATIENT
Start: 2023-01-01

## 2023-01-01 RX ORDER — SIMVASTATIN 40 MG
TABLET ORAL
Qty: 90 TABLET | Refills: 0 | Status: SHIPPED | OUTPATIENT
Start: 2023-01-01

## 2023-01-01 RX ORDER — LEVOTHYROXINE SODIUM 75 UG/1
TABLET ORAL
Qty: 30 TABLET | Refills: 0 | Status: SHIPPED | OUTPATIENT
Start: 2023-01-01

## 2023-01-01 RX ORDER — DICLOFENAC SODIUM 75 MG/1
TABLET, DELAYED RELEASE ORAL
Qty: 180 TABLET | Refills: 1 | Status: CANCELLED | OUTPATIENT
Start: 2023-01-01

## 2023-01-01 RX ORDER — SIMVASTATIN 40 MG
TABLET ORAL
Qty: 90 TABLET | Refills: 0 | OUTPATIENT
Start: 2023-01-01

## 2023-01-01 RX ORDER — LEVOTHYROXINE SODIUM 75 UG/1
TABLET ORAL
Qty: 90 TABLET | Refills: 0 | Status: SHIPPED | OUTPATIENT
Start: 2023-01-01 | End: 2023-01-01

## 2023-01-01 RX ORDER — RIZATRIPTAN BENZOATE 5 MG/1
5-10 TABLET, ORALLY DISINTEGRATING ORAL
Qty: 18 TABLET | Refills: 0 | Status: SHIPPED | OUTPATIENT
Start: 2023-01-01

## 2023-01-01 RX ORDER — RIZATRIPTAN BENZOATE 5 MG/1
5-10 TABLET, ORALLY DISINTEGRATING ORAL
Qty: 18 TABLET | Refills: 5 | Status: CANCELLED | OUTPATIENT
Start: 2023-01-01

## 2023-01-01 RX ORDER — LANOLIN ALCOHOL/MO/W.PET/CERES
3 CREAM (GRAM) TOPICAL
Qty: 30 TABLET | Refills: 0 | Status: SHIPPED | OUTPATIENT
Start: 2023-01-01

## 2023-01-01 RX ORDER — CLOTRIMAZOLE 1 %
1 CREAM WITH APPLICATOR VAGINAL DAILY
Qty: 45 G | Refills: 0 | Status: CANCELLED | OUTPATIENT
Start: 2023-01-01

## 2023-01-01 ASSESSMENT — ACTIVITIES OF DAILY LIVING (ADL)
HOW_WOULD_YOU_RATE_THE_CURRENT_FUNCTION_OF_YOUR_KNEE_DURING_YOUR_USUAL_DAILY_ACTIVITIES_ON_A_SCALE_FROM_0_TO_100_WITH_100_BEING_YOUR_LEVEL_OF_KNEE_FUNCTION_PRIOR_TO_YOUR_INJURY_AND_0_BEING_THE_INABILITY_TO_PERFORM_ANY_OF_YOUR_USUAL_DAILY_ACTIVITIES?: 30
SWELLING: I DO NOT HAVE THE SYMPTOM
SQUAT: ACTIVITY IS SOMEWHAT DIFFICULT
KNEE_ACTIVITY_OF_DAILY_LIVING_SCORE: 71.43
SIT WITH YOUR KNEE BENT: ACTIVITY IS NOT DIFFICULT
GO UP STAIRS: ACTIVITY IS SOMEWHAT DIFFICULT
LIMPING: I DO NOT HAVE THE SYMPTOM
AS_A_RESULT_OF_YOUR_KNEE_INJURY,_HOW_WOULD_YOU_RATE_YOUR_CURRENT_LEVEL_OF_DAILY_ACTIVITY?: ABNORMAL
KNEE_ACTIVITY_OF_DAILY_LIVING_SUM: 50
RISE FROM A CHAIR: ACTIVITY IS SOMEWHAT DIFFICULT
RAW_SCORE: 50
GIVING WAY, BUCKLING OR SHIFTING OF KNEE: THE SYMPTOM AFFECTS MY ACTIVITY MODERATELY
WEAKNESS: I HAVE THE SYMPTOM BUT IT DOES NOT AFFECT MY ACTIVITY
PAIN: THE SYMPTOM AFFECTS MY ACTIVITY SLIGHTLY
STIFFNESS: THE SYMPTOM AFFECTS MY ACTIVITY SLIGHTLY
STAND: ACTIVITY IS MINIMALLY DIFFICULT
HOW_WOULD_YOU_RATE_THE_OVERALL_FUNCTION_OF_YOUR_KNEE_DURING_YOUR_USUAL_DAILY_ACTIVITIES?: NEARLY NORMAL
KNEEL ON THE FRONT OF YOUR KNEE: ACTIVITY IS SOMEWHAT DIFFICULT
GO DOWN STAIRS: ACTIVITY IS SOMEWHAT DIFFICULT
WALK: ACTIVITY IS MINIMALLY DIFFICULT

## 2023-02-22 NOTE — TELEPHONE ENCOUNTER
Reason for Call: Request for an order or referral:    Order or referral being requested: Physical Therapy, new order needed.    Date needed: as soon as possible    Has the patient been seen by the PCP for this problem? YES    Additional comments: [facility], wants to schedule Phys Therapy appt but needs new order, was told it had to come from Dr Elias Marsh.    Phone number Patient can be reached at:  Cell number on file:    Telephone Information:   Mobile [phone]       Best Time:  Tomorrow morning 2/23/23, will not be available this afternoon but you can leave a detailed message.    Can we leave a detailed message on this number?  YES    Call taken on 2/22/2023 at 3:17 PM by Laura Kanavel

## 2023-02-23 NOTE — TELEPHONE ENCOUNTER
Called patient and placed new order for physical therapy for bilateral knee OA.     Physical therapy order placed.     Viridiana Dyer MS, ATC  Certified [employer]er

## 2023-03-24 NOTE — TELEPHONE ENCOUNTER
Patient is completely out of Rizatriptan medication and is requesting a refill.    Sanjuana Russ RN

## 2023-03-27 NOTE — TELEPHONE ENCOUNTER
Called patients sister, Shannan, and relayed Dr Palm message below. Shannan said she would get ahold of her sister and figure out when she can come in and then call back and schedule.    YOUSUF Gomez  [facility]

## 2023-03-27 NOTE — TELEPHONE ENCOUNTER
Patient called back and she does not want to schedule an appointment until after she sees her orthopedic doctor to determine if she can have hip replacement. Patient said she will call back after her orthopedic appointment.  I explained that we cannot keep refilling her medications because she has not established care (in person) appointment.    YOUSUF Gomez  [facility]

## 2023-03-27 NOTE — TELEPHONE ENCOUNTER
Please call and have her establish care with a provider  She needs to come in person and establish care with any provider and also do labs.  It is not safe to prescribe meds without checking her labs.  Last check up for her was 12/20    Dl Palm D.O.

## 2023-03-27 NOTE — TELEPHONE ENCOUNTER
Routing to RN. Please un pend orders and close encounter.    YOUSUF Gomez  [facility]

## 2023-03-28 NOTE — TELEPHONE ENCOUNTER
Patient called to inform us that she made her appt for June with  and was hoping that  would be able to refill medications till then.      Huddled with  and Heidi both in regards to this, per the provider unable to refill without seeing patient first. Due to patient not been seen in clinic since 2020 and labs are way over due.      Called patient back to relay the message offered to get her in sooner with another provider, patient refused and wants to keep appt for June with .  Explained to patient its not safe to keep providing medications without having her labs done first, tried explaining to the patient that we need to see her before June patient not willing to schedule with a different provider. Left appt for June with Dr.Dessie Irais Olivera    [facility]

## 2023-03-29 NOTE — TELEPHONE ENCOUNTER
General Call    Contacts       Type Contact Phone/Fax    03/29/2023 05:32 PM CDT Phone (Incoming) Argenis Marcos (Self) [phone] (OTHER)        Reason for Call:  Pt Concerns    What are your questions or concerns:  Argenis typically see's Dl Palm for all of her primary care needs and was concerned about getting her Thyroid medication refilled and couldn't get an appt with Néstor until June, so she rescheduled with Jannette Barone to be seen sooner. She wants Dr. Palm to know that she will keep seeing Dr. Palm after seeing Jannette Barone for the earlier appt and wanted me to note that she doesn't want Dr. Palm to have any hard feelings. Thank you!    Date of last appointment with provider: n/a    Okay to leave a detailed message?: Yes at Home number on file [phone] (home)

## 2023-03-30 NOTE — TELEPHONE ENCOUNTER
Routing to Provider     Patient just wanted to let you know :)        Irais Olivera    [facility]

## 2023-05-18 PROBLEM — M25.562 BILATERAL KNEE PAIN: Status: ACTIVE | Noted: 2023-01-01

## 2023-05-18 PROBLEM — M25.561 BILATERAL KNEE PAIN: Status: RESOLVED | Noted: 2021-04-15 | Resolved: 2023-01-01

## 2023-05-18 PROBLEM — M25.561 BILATERAL KNEE PAIN: Status: ACTIVE | Noted: 2023-01-01

## 2023-05-18 PROBLEM — M25.562 BILATERAL KNEE PAIN: Status: RESOLVED | Noted: 2021-04-15 | Resolved: 2023-01-01

## 2023-05-18 NOTE — PROGRESS NOTES
PHYSICAL THERAPY EVALUATION  Type of Visit: Evaluation    See electronic medical record for Abuse and Falls Screening details.    Subjective      Presenting condition or subjective complaint: B knee pain  Date of onset: 02/23/23 (PT referral date)    Relevant medical history: Arthritis; Migraines or headaches   Dates & types of surgery:      Prior diagnostic imaging/testing results: X-ray     Prior therapy history for the same diagnosis, illness or injury: Yes 1 visit September 2022    Prior Level of Function   Transfers: Assistive equipment  Ambulation: Assistive equipment  ADL: Assistive equipment and person  IADL:     Living Environment  Social support: With family members   Type of home: House; 2-story; Basement   Stairs to enter the home: Yes 4 Is there a railing: Yes   Ramp: No   Stairs inside the home: Yes 12 Is there a railing: Yes   Help at home: Self Cares (home health aide/personal care attendant, family, etc)  Equipment owned: Four-point cane; Walker; Lift chair     Employment: Not Applicable    Hobbies/Interests:      Patient goals for therapy: Walk, improve mobility    Pain assessment: Pain present     Objective   KNEE EVALUATION  PAIN: Pain Level with Use: 7/10  Pain Location: knee  Pain Quality: Aching and Sharp  Pain Frequency: intermittent  Pain is Worst: time of day does not affect  Pain is Exacerbated By: walking, sitting, transfers  Pain is Relieved By: rest  Pain Progression: Worsened  INTEGUMENTARY (edema, incisions): WNL  POSTURE: Standing Posture: trunk flexion  Sitting Posture: Rounded shoulders  GAIT:   Weightbearing Status: WBAT  Assistive Device(s): Walker (four wheeled)  Gait Deviations: Stride length decreased  Thais decreased  Knee extension decreased L, Knee flexion decreased L, Knee extension decreased R, Knee flexion decreased R  Balance/Proprioception:   Weight Bearing Alignment:   Non-Weight Bearing Alignment:    ROM:   (Degrees) Left AROM Left PROM  Right AROM Right PROM    Knee Flexion 80 90 90 95   Knee Extension 15 10 7 5     Pain: throughout range  End feel: empty    Strength:   Pain: - none + mild ++ moderate +++ severe  Strength Scale: 0-5/5 Left Right   Knee Flexion 4 4   Knee Extension 4 4   Quad Set 4+ 4+     FLEXIBILITY: Decreased quadriceps L, Decreased hamstrings L, Decreased quadriceps R, Decreased hamstrings R  Special Tests:    Left Right   Apley's (Meniscus)     Dara's (Meniscus)     Eber's (ITB/TFL) Positive Positive   Patellar Apprehension Test Negative  Negative    Patella Tracking     Ligamentous Stability     Anterior Drawer (ACL)     Posterior Drawer (PCL)     Prone Dial Test at 30 Deg and 90 Deg (PCL/PLC)     Valgus Stress Testing at 0 Deg and 30 Deg     Varus Stress Testing at 0 Deg and 30 Deg        FUNCTIONAL TESTS: Double Leg Squat: Anterior knee translation, Knee valgus, Hip internal rotation and Improper use of glutes/hips  PALPATION:   + Tenderness At Location Left Right   Medial Joint Line + +   Lateral Joint Line + +   Patellar Tendon - -   IT Band     Incisional     Popliteal     Biceps Femoris     Semitendinosis     Semimembranosis     Glut Medius     Patellar Medial + +   Patellar Lateral     Patellar Superior     Patellar Inferior        JOINT MOBILITY:    Left Right   Tib-Fib Proximal     Patellofemoral Medial Hypomobile Hypomobile   Patellofemoral Lateral Hypomobile Hypomobile   Patellofemoral Superior     Patellofemoral Inferior                Assessment & Plan   CLINICAL IMPRESSIONS   Medical Diagnosis: Chronic pain of both knees    Treatment Diagnosis: B knee pain   Impression/Assessment: Patient is a 77 year old female with B knee complaints.  The following significant findings have been identified: Pain, Decreased ROM/flexibility, Decreased strength and Impaired gait. These impairments interfere with their ability to perform self care tasks, recreational activities, household chores, driving , household mobility and community mobility as  compared to previous level of function.     Clinical Decision Making (Complexity):   Clinical Presentation: Stable/Uncomplicated  Clinical Presentation Rationale: based on medical and personal factors listed in PT evaluation  Clinical Decision Making (Complexity): Low complexity    PLAN OF CARE  Treatment Interventions:  Interventions: Gait Training, Manual Therapy, Neuromuscular Re-education, Therapeutic Activity, Therapeutic Exercise    Long Term Goals     PT Goal 1  Goal Identifier: B knee pain  Goal Description: Pt will improve endurance to ambulate 10 min with 4WW  Rationale: to maximize safety and independence within the community;to maximize safety and independence with transportation;to maximize safety and independence with self cares  Target Date: 07/12/23      Frequency of Treatment: 1x/week  Duration of Treatment: 8 weeks    Recommended Referrals to Other Professionals:   Education Assessment:   Learner/Method: Patient;Family;Demonstration;Pictures/Video    Risks and benefits of evaluation/treatment have been explained.   Patient/Family/caregiver agrees with Plan of Care.     Evaluation Time:     PT Eval, Low Complexity Minutes (21422): 15      Signing Clinician: Renea Nicolas, PT      [facility]                                                                                   OUTPATIENT PHYSICAL THERAPY      PLAN OF TREATMENT FOR OUTPATIENT REHABILITATION   Patient's Last Name, First Name, Argenis Santos YOB: 1945   Provider's Name   [facility]   Medical Record No.  [id number]     Onset Date: 02/23/23 (PT referral date)  Start of Care Date: 05/17/23     Medical Diagnosis:  Chronic pain of both knees      PT Treatment Diagnosis:  B knee pain Plan of Treatment  Frequency/Duration: 1x/week/ 8 weeks    Certification date from 05/17/23 to 07/12/23         See note for plan of treatment details and functional goals     Renea  Maria Isabel, PT                         I CERTIFY THE NEED FOR THESE SERVICES FURNISHED UNDER        THIS PLAN OF TREATMENT AND WHILE UNDER MY CARE     (Physician attestation of this document indicates review and certification of the therapy plan).                  Referring Provider:  Elias Marsh      Initial Assessment  See Epic Evaluation- Start of Care Date: 05/17/23

## 2023-05-18 NOTE — PROGRESS NOTES
Argenis is a 77 year old who is being evaluated via a billable telephone visit.      What phone number would you like to be contacted at?    [phone]       How would you like to obtain your AVS? Mail a copy    Distant Location (provider location):  On-site    Assessment & Plan     Hypothyroidism due to acquired atrophy of thyroid    - levothyroxine (SYNTHROID/LEVOTHROID) 75 MCG tablet; TAKE 1 TABLET(75 MCG) BY MOUTH DAILY  Patient is due for office visit and/or labs before further refills.  - PRIMARY CARE FOLLOW-UP SCHEDULING; Future  - PRIMARY CARE FOLLOW-UP SCHEDULING; Future    Migraine with aura and without status migrainosus, not intractable    - rizatriptan (MAXALT-MLT) 5 MG ODT; Take 1-2 tablets (5-10 mg) by mouth at onset of headache for migraine May repeat dose in 2 hours.  Do not exceed 30 mg in 24 hours    Vulvar itching    - clotrimazole (LOTRIMIN) 1 % external cream; Apply topically 2 times daily To affected area    Sleep trouble    - melatonin 3 MG tablet; Take 1 tablet (3 mg) by mouth nightly as needed for sleep    New patient to provider today- for telephone visit.  Was supposed to be a physical today but changed to telephone visit.  She has not been seen face to face in primary care since 10/2020.  She has not had any labs done since 8/2020.  She had virtual telephone visit with Dr. Palm who had recommended labs and to follow up for an in office visit.  She has not done recommended follow up until today but today is a telephone visit.    I agreed to a 1 month supply of medication only today, but patient needs to follow up for the previously ordered labs ASAP.  I discussed with patient the risk that her medication may be harming her without lab monitoring.  She states she will schedule an in person appointment for June 2023, but I asked patient to get the Labs sooner.                 Jannette Barone NP  [facility]    Subjective   Argenis is a 77 year old, presenting  "for the following health issues:  Recheck Medication        5/18/2023     1:10 PM   Additional Questions   Roomed by Gloria RODRIGUEZ     History of Present Illness       Hypothyroidism:     Since last visit, patient describes the following symptoms::  Fatigue    Headaches:   Since the patient's last clinic visit, headaches are: no change  The patient is getting headaches:  Couple times a month  She is able to do normal daily activities when she has a migraine.  The patient is taking the following rescue/relief medications:  Maxalt   Patient states \"I get total relief\" from the rescue/relief medications.   The patient is taking the following medications to prevent migraines:  No medications to prevent migraines  In the past 4 weeks, the patient has gone to an Urgent Care or Emergency Room 0 times times due to headaches.    She eats 2-3 servings of fruits and vegetables daily.She consumes 0 sweetened beverage(s) daily.She exercises with enough effort to increase her heart rate 60 or more minutes per day.  She exercises with enough effort to increase her heart rate 7 days per week. She is missing 1 dose(s) of medications per week.  She is not taking prescribed medications regularly due to remembering to take.    New patient to provider today- for telephone visit.  Was supposed to be a physical today but changed to telephone visit.  She has not been seen face to face in primary care since 10/2020.  She has not had any labs done since 8/2020.  She had virtual telephone visit with Dr. Palm who had recommended labs and to follow up for an in office visit.  She has not done recommended follow up until today but today is a telephone visit.    Brother/sister takes her to appointments, patient does not drive.  Says she has bad arthritis, might be getting joint surgery this year.    Trouble sleeping, at times will lay all night and not be able to sleep.      Review of Systems   Constitutional, HEENT, cardiovascular, pulmonary, gi and " gu systems are negative, except as otherwise noted.      Objective           Vitals:  No vitals were obtained today due to virtual visit.    Physical Exam   Remainder of exam unable to be completed due to telephone visits            Phone call duration: 20 minutes

## 2023-06-13 ENCOUNTER — TELEPHONE (OUTPATIENT)
Dept: FAMILY MEDICINE | Facility: CLINIC | Age: 78
End: 2023-06-13
